# Patient Record
Sex: MALE | Race: WHITE | Employment: OTHER | URBAN - NONMETROPOLITAN AREA
[De-identification: names, ages, dates, MRNs, and addresses within clinical notes are randomized per-mention and may not be internally consistent; named-entity substitution may affect disease eponyms.]

---

## 2017-02-21 ENCOUNTER — HOSPITAL ENCOUNTER (OUTPATIENT)
Dept: GENERAL RADIOLOGY | Age: 82
Discharge: HOME | End: 2017-02-21
Payer: MEDICARE

## 2017-02-21 DIAGNOSIS — R05.9 COUGH: ICD-10-CM

## 2017-02-21 PROCEDURE — 71020 XR CHEST STANDARD TWO VW: CPT

## 2017-07-10 RX ORDER — INSULIN LISPRO PROTAMIN/LISPRO 75-25/ML
VIAL (ML) SUBCUTANEOUS
Qty: 1 VIAL | Refills: 5 | Status: SHIPPED | OUTPATIENT
Start: 2017-07-10 | End: 2017-07-26

## 2017-07-20 LAB
ALBUMIN SERPL-MCNC: 3.9 G/DL (ref 3.5–5.2)
ALP BLD-CCNC: 63 U/L (ref 40–130)
ALT SERPL-CCNC: 19 U/L (ref 5–41)
ANION GAP SERPL CALCULATED.3IONS-SCNC: 17 MMOL/L (ref 7–19)
AST SERPL-CCNC: 20 U/L (ref 5–40)
BILIRUB SERPL-MCNC: 0.7 MG/DL (ref 0.2–1.2)
BUN BLDV-MCNC: 20 MG/DL (ref 8–23)
CALCIUM SERPL-MCNC: 9.4 MG/DL (ref 8.8–10.2)
CHLORIDE BLD-SCNC: 100 MMOL/L (ref 98–111)
CO2: 28 MMOL/L (ref 22–29)
CREAT SERPL-MCNC: 1 MG/DL (ref 0.5–1.2)
GFR NON-AFRICAN AMERICAN: >60
GLUCOSE BLD-MCNC: 120 MG/DL (ref 74–109)
HBA1C MFR BLD: 7.7 %
LDL CHOLESTEROL DIRECT: 122 MG/DL
POTASSIUM SERPL-SCNC: 4.3 MMOL/L (ref 3.5–5)
SODIUM BLD-SCNC: 145 MMOL/L (ref 136–145)
TOTAL PROTEIN: 7.5 G/DL (ref 6.6–8.7)
TRIGL SERPL-MCNC: 156 MG/DL (ref 150–199)

## 2017-07-26 RX ORDER — DORZOLAMIDE HYDROCHLORIDE AND TIMOLOL MALEATE 20; 5 MG/ML; MG/ML
1 SOLUTION/ DROPS OPHTHALMIC DAILY
COMMUNITY
End: 2017-07-27

## 2017-07-26 RX ORDER — PRAVASTATIN SODIUM 40 MG
40 TABLET ORAL DAILY
COMMUNITY
End: 2017-07-27 | Stop reason: DRUGHIGH

## 2017-07-27 ENCOUNTER — OFFICE VISIT (OUTPATIENT)
Dept: INTERNAL MEDICINE | Age: 82
End: 2017-07-27
Payer: MEDICARE

## 2017-07-27 VITALS
BODY MASS INDEX: 25.48 KG/M2 | SYSTOLIC BLOOD PRESSURE: 138 MMHG | WEIGHT: 182 LBS | HEART RATE: 78 BPM | HEIGHT: 71 IN | OXYGEN SATURATION: 98 % | DIASTOLIC BLOOD PRESSURE: 78 MMHG

## 2017-07-27 DIAGNOSIS — E11.42 DIABETIC PERIPHERAL NEUROPATHY (HCC): ICD-10-CM

## 2017-07-27 DIAGNOSIS — E78.2 MIXED HYPERLIPIDEMIA: Primary | ICD-10-CM

## 2017-07-27 DIAGNOSIS — N40.0 BENIGN PROSTATIC HYPERPLASIA WITHOUT LOWER URINARY TRACT SYMPTOMS, UNSPECIFIED MORPHOLOGY: ICD-10-CM

## 2017-07-27 DIAGNOSIS — E11.42 TYPE 2 DIABETES, CONTROLLED, WITH PERIPHERAL NEUROPATHY (HCC): ICD-10-CM

## 2017-07-27 PROCEDURE — 99214 OFFICE O/P EST MOD 30 MIN: CPT | Performed by: INTERNAL MEDICINE

## 2017-07-27 RX ORDER — ATORVASTATIN CALCIUM 40 MG/1
40 TABLET, FILM COATED ORAL DAILY
Qty: 30 TABLET | Refills: 5 | Status: SHIPPED | OUTPATIENT
Start: 2017-07-27 | End: 2018-01-24 | Stop reason: SDUPTHER

## 2017-07-27 ASSESSMENT — ENCOUNTER SYMPTOMS
COUGH: 0
RHINORRHEA: 0
ABDOMINAL PAIN: 0
SORE THROAT: 0
TROUBLE SWALLOWING: 0
NAUSEA: 0
SHORTNESS OF BREATH: 0

## 2017-07-27 ASSESSMENT — PATIENT HEALTH QUESTIONNAIRE - PHQ9
1. LITTLE INTEREST OR PLEASURE IN DOING THINGS: 0
SUM OF ALL RESPONSES TO PHQ9 QUESTIONS 1 & 2: 0
SUM OF ALL RESPONSES TO PHQ QUESTIONS 1-9: 0
2. FEELING DOWN, DEPRESSED OR HOPELESS: 0

## 2018-01-22 DIAGNOSIS — E11.42 DIABETIC PERIPHERAL NEUROPATHY (HCC): ICD-10-CM

## 2018-01-22 DIAGNOSIS — E78.2 MIXED HYPERLIPIDEMIA: ICD-10-CM

## 2018-01-22 DIAGNOSIS — E11.42 TYPE 2 DIABETES, CONTROLLED, WITH PERIPHERAL NEUROPATHY (HCC): ICD-10-CM

## 2018-01-22 DIAGNOSIS — N40.0 BENIGN PROSTATIC HYPERPLASIA WITHOUT LOWER URINARY TRACT SYMPTOMS: ICD-10-CM

## 2018-01-22 LAB
ALBUMIN SERPL-MCNC: 4 G/DL (ref 3.5–5.2)
ALP BLD-CCNC: 76 U/L (ref 40–130)
ALT SERPL-CCNC: 44 U/L (ref 5–41)
ANION GAP SERPL CALCULATED.3IONS-SCNC: 15 MMOL/L (ref 7–19)
AST SERPL-CCNC: 31 U/L (ref 5–40)
BILIRUB SERPL-MCNC: 1.2 MG/DL (ref 0.2–1.2)
BUN BLDV-MCNC: 16 MG/DL (ref 8–23)
CALCIUM SERPL-MCNC: 9.2 MG/DL (ref 8.8–10.2)
CHLORIDE BLD-SCNC: 100 MMOL/L (ref 98–111)
CO2: 31 MMOL/L (ref 22–29)
CREAT SERPL-MCNC: 0.9 MG/DL (ref 0.5–1.2)
GFR NON-AFRICAN AMERICAN: >60
GLUCOSE BLD-MCNC: 152 MG/DL (ref 74–109)
HBA1C MFR BLD: 8.8 %
LDL CHOLESTEROL DIRECT: 50 MG/DL
POTASSIUM SERPL-SCNC: 5 MMOL/L (ref 3.5–5)
SODIUM BLD-SCNC: 146 MMOL/L (ref 136–145)
TOTAL PROTEIN: 7.3 G/DL (ref 6.6–8.7)

## 2018-01-24 ENCOUNTER — OFFICE VISIT (OUTPATIENT)
Dept: INTERNAL MEDICINE | Age: 83
End: 2018-01-24
Payer: MEDICARE

## 2018-01-24 VITALS
OXYGEN SATURATION: 96 % | DIASTOLIC BLOOD PRESSURE: 72 MMHG | BODY MASS INDEX: 26.63 KG/M2 | HEIGHT: 70 IN | WEIGHT: 186 LBS | SYSTOLIC BLOOD PRESSURE: 128 MMHG | HEART RATE: 82 BPM

## 2018-01-24 DIAGNOSIS — E11.42 DIABETIC PERIPHERAL NEUROPATHY (HCC): Primary | ICD-10-CM

## 2018-01-24 DIAGNOSIS — E78.2 MIXED HYPERLIPIDEMIA: ICD-10-CM

## 2018-01-24 DIAGNOSIS — E11.42 TYPE 2 DIABETES, CONTROLLED, WITH PERIPHERAL NEUROPATHY (HCC): ICD-10-CM

## 2018-01-24 PROCEDURE — 4040F PNEUMOC VAC/ADMIN/RCVD: CPT | Performed by: INTERNAL MEDICINE

## 2018-01-24 PROCEDURE — G8417 CALC BMI ABV UP PARAM F/U: HCPCS | Performed by: INTERNAL MEDICINE

## 2018-01-24 PROCEDURE — 99214 OFFICE O/P EST MOD 30 MIN: CPT | Performed by: INTERNAL MEDICINE

## 2018-01-24 PROCEDURE — 1123F ACP DISCUSS/DSCN MKR DOCD: CPT | Performed by: INTERNAL MEDICINE

## 2018-01-24 PROCEDURE — G8427 DOCREV CUR MEDS BY ELIG CLIN: HCPCS | Performed by: INTERNAL MEDICINE

## 2018-01-24 PROCEDURE — G8484 FLU IMMUNIZE NO ADMIN: HCPCS | Performed by: INTERNAL MEDICINE

## 2018-01-24 PROCEDURE — 1036F TOBACCO NON-USER: CPT | Performed by: INTERNAL MEDICINE

## 2018-01-24 RX ORDER — METRONIDAZOLE 10 MG/G
GEL TOPICAL DAILY PRN
Qty: 45 G | Refills: 1 | Status: SHIPPED | OUTPATIENT
Start: 2018-01-24

## 2018-01-24 RX ORDER — METRONIDAZOLE 10 MG/G
GEL TOPICAL DAILY PRN
COMMUNITY
End: 2018-01-24 | Stop reason: SDUPTHER

## 2018-01-24 RX ORDER — ATORVASTATIN CALCIUM 40 MG/1
40 TABLET, FILM COATED ORAL DAILY
Qty: 90 TABLET | Refills: 3 | Status: SHIPPED | OUTPATIENT
Start: 2018-01-24 | End: 2018-04-23 | Stop reason: SDUPTHER

## 2018-01-24 ASSESSMENT — ENCOUNTER SYMPTOMS
SHORTNESS OF BREATH: 0
ABDOMINAL PAIN: 0
RHINORRHEA: 0
COUGH: 0
SORE THROAT: 0
NAUSEA: 0
TROUBLE SWALLOWING: 0

## 2018-01-24 NOTE — PROGRESS NOTES
[facility] Internal Medicine    Chief Complaint   Patient presents with    6 Month Follow-Up     Pt is here for follow up of DM and mixed hyperlipidemia. Pt continues to experience bilateral lower ext pain. States his legs feel \"tired\" all the time. HPI:Returns reassessment of diabetes, right ankle dysfunction, peripheral neuropathy, hyperlipidemia. He is doing fairly well, does wear a brace on the right. Dr. Aaron Sirgreg examined his eyes every 3 months, some diabetic changes been noted. His right peripheral vision tends to be decreased. He is taking 75/25 insulin, 48 in the morning, 28 at night, averaging 100135 blood sugar morning and evening, he thinks. He denies hypoglycemia. He does have chronic pain in his feet, and tends to tire when he walks several feet. His pulses are present, feet are warm, although pulses are weak. Past Medical History:   Diagnosis Date    Adenomatous polyp of colon      tubular adenoma     Benign enlargement of prostate     Cataract     Diabetes ([facility] [address]     Diabetes mellitus type 2, controlled ([address]     Diabetic peripheral neuropathy (HCC)     Elevated blood pressure reading without diagnosis of hypertension     Fall     Glaucoma     Glaucoma     Hearing loss     Mixed hyperlipidemia     Myalgia     Sleep apnea     did not tolerate CPAP    Type 2 diabetes, controlled, with peripheral neuropathy (HCC)     Vertigo       Family History   Problem Relation Age of Onset    Cancer Mother      breast  age 36    Diabetes Father     Coronary Art Dis Father     Diabetes Other     Liver Cancer Sister       Social History     Social History    Marital status:      Spouse name: N/A    Number of children: N/A    Years of education: N/A     Occupational History    Not on file.      Social History Main Topics    Smoking status: Never Smoker    Smokeless tobacco: Never Used    Alcohol use No    Drug use: No    Sexual activity: Not Currently     Other weak  3. Mixed hyperlipidemia: LDL cholesterol is 50, continue statin    He does mention tiring of his legs with walking, which could indicate claudication. Foot care is excellent, pulses are weak. We offered a vascular study, which she declined at this point. We will need to keep that in mind.     Next visit 4, 5 months, CMP, Y2Q, to see Paty Solorio Placed This Encounter   Procedures    Comprehensive Metabolic Panel     Standing Status:   Future     Standing Expiration Date:   1/24/2019    Hemoglobin A1C     Standing Status:   Future     Standing Expiration Date:   1/24/2019

## 2018-03-19 RX ORDER — ATORVASTATIN CALCIUM 40 MG/1
TABLET, FILM COATED ORAL
Qty: 30 TABLET | Refills: 5 | Status: SHIPPED | OUTPATIENT
Start: 2018-03-19

## 2018-04-23 ENCOUNTER — OFFICE VISIT (OUTPATIENT)
Dept: INTERNAL MEDICINE | Age: 83
End: 2018-04-23
Payer: MEDICARE

## 2018-04-23 ENCOUNTER — HOSPITAL ENCOUNTER (OUTPATIENT)
Dept: GENERAL RADIOLOGY | Age: 83
Discharge: HOME | End: 2018-04-23
Payer: MEDICARE

## 2018-04-23 VITALS
DIASTOLIC BLOOD PRESSURE: 84 MMHG | SYSTOLIC BLOOD PRESSURE: 182 MMHG | HEART RATE: 75 BPM | OXYGEN SATURATION: 95 % | TEMPERATURE: 98.5 F | HEIGHT: 70 IN | BODY MASS INDEX: 26.92 KG/M2 | WEIGHT: 188 LBS

## 2018-04-23 DIAGNOSIS — R07.89 RIGHT-SIDED CHEST WALL PAIN: ICD-10-CM

## 2018-04-23 DIAGNOSIS — R07.89 RIGHT-SIDED CHEST WALL PAIN: Primary | ICD-10-CM

## 2018-04-23 PROCEDURE — 4040F PNEUMOC VAC/ADMIN/RCVD: CPT | Performed by: NURSE PRACTITIONER

## 2018-04-23 PROCEDURE — 1036F TOBACCO NON-USER: CPT | Performed by: NURSE PRACTITIONER

## 2018-04-23 PROCEDURE — G8427 DOCREV CUR MEDS BY ELIG CLIN: HCPCS | Performed by: NURSE PRACTITIONER

## 2018-04-23 PROCEDURE — 99214 OFFICE O/P EST MOD 30 MIN: CPT | Performed by: NURSE PRACTITIONER

## 2018-04-23 PROCEDURE — 96372 THER/PROPH/DIAG INJ SC/IM: CPT | Performed by: NURSE PRACTITIONER

## 2018-04-23 PROCEDURE — G8417 CALC BMI ABV UP PARAM F/U: HCPCS | Performed by: NURSE PRACTITIONER

## 2018-04-23 PROCEDURE — 71046 X-RAY EXAM CHEST 2 VIEWS: CPT

## 2018-04-23 PROCEDURE — 1123F ACP DISCUSS/DSCN MKR DOCD: CPT | Performed by: NURSE PRACTITIONER

## 2018-04-23 RX ORDER — METHYLPREDNISOLONE ACETATE 80 MG/ML
80 INJECTION, SUSPENSION INTRA-ARTICULAR; INTRALESIONAL; INTRAMUSCULAR; SOFT TISSUE ONCE
Status: COMPLETED | OUTPATIENT
Start: 2018-04-23 | End: 2018-04-23

## 2018-04-23 RX ADMIN — METHYLPREDNISOLONE ACETATE 80 MG: 80 INJECTION, SUSPENSION INTRA-ARTICULAR; INTRALESIONAL; INTRAMUSCULAR; SOFT TISSUE at 09:21

## 2018-04-23 ASSESSMENT — ENCOUNTER SYMPTOMS
RHINORRHEA: 0
BACK PAIN: 0
SHORTNESS OF BREATH: 0
PHOTOPHOBIA: 0
VOMITING: 0
VOICE CHANGE: 0
WHEEZING: 0
NAUSEA: 0
COLOR CHANGE: 0
COUGH: 0

## 2018-04-28 ENCOUNTER — HOSPITAL ENCOUNTER (EMERGENCY)
Age: 83
Discharge: HOME | End: 2018-04-28
Payer: MEDICARE

## 2018-04-28 ENCOUNTER — APPOINTMENT (OUTPATIENT)
Dept: CT IMAGING | Age: 83
End: 2018-04-28
Payer: MEDICARE

## 2018-04-28 ENCOUNTER — APPOINTMENT (OUTPATIENT)
Dept: GENERAL RADIOLOGY | Age: 83
End: 2018-04-28
Payer: MEDICARE

## 2018-04-28 ENCOUNTER — OFFICE VISIT (OUTPATIENT)
Dept: URGENT CARE | Age: 83
End: 2018-04-28

## 2018-04-28 VITALS
HEART RATE: 70 BPM | HEIGHT: 70 IN | TEMPERATURE: 98.2 F | BODY MASS INDEX: 25.2 KG/M2 | WEIGHT: 176 LBS | RESPIRATION RATE: 14 BRPM | OXYGEN SATURATION: 93 % | SYSTOLIC BLOOD PRESSURE: 155 MMHG | DIASTOLIC BLOOD PRESSURE: 64 MMHG

## 2018-04-28 VITALS
OXYGEN SATURATION: 95 % | DIASTOLIC BLOOD PRESSURE: 62 MMHG | WEIGHT: 186 LBS | HEART RATE: 83 BPM | RESPIRATION RATE: 18 BRPM | TEMPERATURE: 97.7 F | SYSTOLIC BLOOD PRESSURE: 138 MMHG | BODY MASS INDEX: 26.69 KG/M2

## 2018-04-28 DIAGNOSIS — K80.50 BILIARY COLIC: Primary | ICD-10-CM

## 2018-04-28 DIAGNOSIS — R10.9 ABDOMINAL PAIN IN MALE: Primary | ICD-10-CM

## 2018-04-28 DIAGNOSIS — K80.20 CALCULUS OF GALLBLADDER WITHOUT CHOLECYSTITIS WITHOUT OBSTRUCTION: ICD-10-CM

## 2018-04-28 LAB
ALBUMIN SERPL-MCNC: 4 G/DL (ref 3.5–5.2)
ALP BLD-CCNC: 70 U/L (ref 40–130)
ALT SERPL-CCNC: 33 U/L (ref 5–41)
ANION GAP SERPL CALCULATED.3IONS-SCNC: 11 MMOL/L (ref 7–19)
AST SERPL-CCNC: 22 U/L (ref 5–40)
BASOPHILS ABSOLUTE: 0 K/UL (ref 0–0.2)
BASOPHILS RELATIVE PERCENT: 0.4 % (ref 0–1)
BILIRUB SERPL-MCNC: 0.7 MG/DL (ref 0.2–1.2)
BILIRUBIN URINE: NEGATIVE
BLOOD, URINE: NEGATIVE
BUN BLDV-MCNC: 21 MG/DL (ref 8–23)
CALCIUM SERPL-MCNC: 9.5 MG/DL (ref 8.8–10.2)
CHLORIDE BLD-SCNC: 99 MMOL/L (ref 98–111)
CLARITY: CLEAR
CO2: 33 MMOL/L (ref 22–29)
COLOR: YELLOW
CREAT SERPL-MCNC: 0.9 MG/DL (ref 0.5–1.2)
EOSINOPHILS ABSOLUTE: 0.1 K/UL (ref 0–0.6)
EOSINOPHILS RELATIVE PERCENT: 1.8 % (ref 0–5)
GFR NON-AFRICAN AMERICAN: >60
GLUCOSE BLD-MCNC: 278 MG/DL (ref 74–109)
GLUCOSE URINE: 250 MG/DL
HCT VFR BLD CALC: 46.6 % (ref 42–52)
HEMOGLOBIN: 15.8 G/DL (ref 14–18)
KETONES, URINE: NEGATIVE MG/DL
LACTIC ACID: 1.3 MMOL/L (ref 0.5–1.9)
LEUKOCYTE ESTERASE, URINE: NEGATIVE
LIPASE: 30 U/L (ref 13–60)
LYMPHOCYTES ABSOLUTE: 2.2 K/UL (ref 1.1–4.5)
LYMPHOCYTES RELATIVE PERCENT: 30.5 % (ref 20–40)
MCH RBC QN AUTO: 31.9 PG (ref 27–31)
MCHC RBC AUTO-ENTMCNC: 33.9 G/DL (ref 33–37)
MCV RBC AUTO: 94 FL (ref 80–94)
MONOCYTES ABSOLUTE: 0.5 K/UL (ref 0–0.9)
MONOCYTES RELATIVE PERCENT: 7.1 % (ref 0–10)
NEUTROPHILS ABSOLUTE: 4.3 K/UL (ref 1.5–7.5)
NEUTROPHILS RELATIVE PERCENT: 60.2 % (ref 50–65)
NITRITE, URINE: NEGATIVE
PDW BLD-RTO: 12.6 % (ref 11.5–14.5)
PERFORMED ON: NORMAL
PH UA: 6
PLATELET # BLD: 124 K/UL (ref 130–400)
PMV BLD AUTO: 10 FL (ref 9.4–12.4)
POC TROPONIN I: 0.01 NG/ML (ref 0–0.08)
POTASSIUM SERPL-SCNC: 4 MMOL/L (ref 3.5–5)
PROTEIN UA: NEGATIVE MG/DL
RBC # BLD: 4.96 M/UL (ref 4.7–6.1)
SODIUM BLD-SCNC: 143 MMOL/L (ref 136–145)
SPECIFIC GRAVITY UA: 1.02
TOTAL PROTEIN: 7.1 G/DL (ref 6.6–8.7)
UROBILINOGEN, URINE: 0.2 E.U./DL
WBC # BLD: 7.2 K/UL (ref 4.8–10.8)

## 2018-04-28 PROCEDURE — 80053 COMPREHEN METABOLIC PANEL: CPT

## 2018-04-28 PROCEDURE — 6360000004 HC RX CONTRAST MEDICATION: Performed by: NURSE PRACTITIONER

## 2018-04-28 PROCEDURE — 81003 URINALYSIS AUTO W/O SCOPE: CPT

## 2018-04-28 PROCEDURE — 83605 ASSAY OF LACTIC ACID: CPT

## 2018-04-28 PROCEDURE — 85025 COMPLETE CBC W/AUTO DIFF WBC: CPT

## 2018-04-28 PROCEDURE — 36415 COLL VENOUS BLD VENIPUNCTURE: CPT

## 2018-04-28 PROCEDURE — 83690 ASSAY OF LIPASE: CPT

## 2018-04-28 PROCEDURE — 2580000003 HC RX 258: Performed by: NURSE PRACTITIONER

## 2018-04-28 PROCEDURE — 84484 ASSAY OF TROPONIN QUANT: CPT

## 2018-04-28 PROCEDURE — 99999 PR OFFICE/OUTPT VISIT,PROCEDURE ONLY: CPT | Performed by: SPECIALIST

## 2018-04-28 PROCEDURE — 74177 CT ABD & PELVIS W/CONTRAST: CPT

## 2018-04-28 PROCEDURE — 93005 ELECTROCARDIOGRAM TRACING: CPT

## 2018-04-28 PROCEDURE — 99284 EMERGENCY DEPT VISIT MOD MDM: CPT

## 2018-04-28 PROCEDURE — 99285 EMERGENCY DEPT VISIT HI MDM: CPT | Performed by: NURSE PRACTITIONER

## 2018-04-28 PROCEDURE — 96374 THER/PROPH/DIAG INJ IV PUSH: CPT

## 2018-04-28 PROCEDURE — 6360000002 HC RX W HCPCS: Performed by: NURSE PRACTITIONER

## 2018-04-28 PROCEDURE — 71046 X-RAY EXAM CHEST 2 VIEWS: CPT

## 2018-04-28 RX ORDER — TRAMADOL HYDROCHLORIDE 50 MG/1
50 TABLET ORAL EVERY 6 HOURS PRN
Qty: 12 TABLET | Refills: 0 | Status: SHIPPED | OUTPATIENT
Start: 2018-04-28 | End: 2018-04-30 | Stop reason: SDUPTHER

## 2018-04-28 RX ORDER — 0.9 % SODIUM CHLORIDE 0.9 %
1000 INTRAVENOUS SOLUTION INTRAVENOUS ONCE
Status: COMPLETED | OUTPATIENT
Start: 2018-04-28 | End: 2018-04-28

## 2018-04-28 RX ORDER — KETOROLAC TROMETHAMINE 30 MG/ML
15 INJECTION, SOLUTION INTRAMUSCULAR; INTRAVENOUS ONCE
Status: COMPLETED | OUTPATIENT
Start: 2018-04-28 | End: 2018-04-28

## 2018-04-28 RX ADMIN — SODIUM CHLORIDE 1000 ML: 9 INJECTION, SOLUTION INTRAVENOUS at 09:43

## 2018-04-28 RX ADMIN — KETOROLAC TROMETHAMINE 15 MG: 30 INJECTION, SOLUTION INTRAMUSCULAR; INTRAVENOUS at 09:43

## 2018-04-28 RX ADMIN — IOPAMIDOL 90 ML: 755 INJECTION, SOLUTION INTRAVENOUS at 09:49

## 2018-04-28 ASSESSMENT — PAIN SCALES - GENERAL
PAINLEVEL_OUTOF10: 3
PAINLEVEL_OUTOF10: 4
PAINLEVEL_OUTOF10: 0
PAINLEVEL_OUTOF10: 4

## 2018-04-28 ASSESSMENT — ENCOUNTER SYMPTOMS: ABDOMINAL PAIN: 1

## 2018-04-28 ASSESSMENT — PAIN DESCRIPTION - PAIN TYPE: TYPE: ACUTE PAIN

## 2018-04-30 ENCOUNTER — OFFICE VISIT (OUTPATIENT)
Dept: INTERNAL MEDICINE | Age: 83
End: 2018-04-30
Payer: MEDICARE

## 2018-04-30 VITALS
SYSTOLIC BLOOD PRESSURE: 160 MMHG | OXYGEN SATURATION: 95 % | TEMPERATURE: 98.7 F | HEART RATE: 75 BPM | DIASTOLIC BLOOD PRESSURE: 80 MMHG

## 2018-04-30 DIAGNOSIS — K80.50 BILIARY COLIC: ICD-10-CM

## 2018-04-30 DIAGNOSIS — K80.20 CALCULUS OF GALLBLADDER WITHOUT CHOLECYSTITIS WITHOUT OBSTRUCTION: ICD-10-CM

## 2018-04-30 DIAGNOSIS — B02.9 HERPES ZOSTER WITHOUT COMPLICATION: Primary | ICD-10-CM

## 2018-04-30 PROCEDURE — G8417 CALC BMI ABV UP PARAM F/U: HCPCS | Performed by: NURSE PRACTITIONER

## 2018-04-30 PROCEDURE — 4040F PNEUMOC VAC/ADMIN/RCVD: CPT | Performed by: NURSE PRACTITIONER

## 2018-04-30 PROCEDURE — 1036F TOBACCO NON-USER: CPT | Performed by: NURSE PRACTITIONER

## 2018-04-30 PROCEDURE — G8427 DOCREV CUR MEDS BY ELIG CLIN: HCPCS | Performed by: NURSE PRACTITIONER

## 2018-04-30 PROCEDURE — 1123F ACP DISCUSS/DSCN MKR DOCD: CPT | Performed by: NURSE PRACTITIONER

## 2018-04-30 PROCEDURE — 99214 OFFICE O/P EST MOD 30 MIN: CPT | Performed by: NURSE PRACTITIONER

## 2018-04-30 RX ORDER — TRAMADOL HYDROCHLORIDE 50 MG/1
50 TABLET ORAL 3 TIMES DAILY PRN
Qty: 30 TABLET | Refills: 0 | Status: SHIPPED | OUTPATIENT
Start: 2018-04-30 | End: 2018-05-30

## 2018-04-30 RX ORDER — PREDNISONE 10 MG/1
TABLET ORAL
Qty: 39 TABLET | Refills: 0 | Status: SHIPPED | OUTPATIENT
Start: 2018-04-30

## 2018-04-30 RX ORDER — METHYLPREDNISOLONE 4 MG/1
TABLET ORAL
Qty: 1 KIT | Refills: 0 | Status: SHIPPED | OUTPATIENT
Start: 2018-04-30 | End: 2018-04-30 | Stop reason: ALTCHOICE

## 2018-04-30 RX ORDER — VALACYCLOVIR HYDROCHLORIDE 1 G/1
1000 TABLET, FILM COATED ORAL 3 TIMES DAILY
Qty: 21 TABLET | Refills: 0 | Status: SHIPPED | OUTPATIENT
Start: 2018-04-30

## 2018-04-30 ASSESSMENT — ENCOUNTER SYMPTOMS
NAUSEA: 0
RHINORRHEA: 0
VOMITING: 0
BACK PAIN: 0
VOICE CHANGE: 0
COLOR CHANGE: 0
SHORTNESS OF BREATH: 0
ABDOMINAL PAIN: 1
COUGH: 0
PHOTOPHOBIA: 0

## 2018-05-01 LAB
EKG P AXIS: 66 DEGREES
EKG P-R INTERVAL: 274 MS
EKG Q-T INTERVAL: 404 MS
EKG QRS DURATION: 154 MS
EKG QTC CALCULATION (BAZETT): 421 MS
EKG T AXIS: 21 DEGREES

## 2018-05-02 ENCOUNTER — OFFICE VISIT (OUTPATIENT)
Dept: SURGERY | Age: 83
End: 2018-05-02
Payer: MEDICARE

## 2018-05-02 VITALS
TEMPERATURE: 97.4 F | BODY MASS INDEX: 26.17 KG/M2 | DIASTOLIC BLOOD PRESSURE: 74 MMHG | WEIGHT: 182.8 LBS | SYSTOLIC BLOOD PRESSURE: 140 MMHG | HEIGHT: 70 IN

## 2018-05-02 DIAGNOSIS — K80.10 CALCULUS OF GALLBLADDER WITH CHRONIC CHOLECYSTITIS WITHOUT OBSTRUCTION: Primary | ICD-10-CM

## 2018-05-02 PROCEDURE — 1036F TOBACCO NON-USER: CPT | Performed by: PHYSICIAN ASSISTANT

## 2018-05-02 PROCEDURE — 4040F PNEUMOC VAC/ADMIN/RCVD: CPT | Performed by: PHYSICIAN ASSISTANT

## 2018-05-02 PROCEDURE — G8427 DOCREV CUR MEDS BY ELIG CLIN: HCPCS | Performed by: PHYSICIAN ASSISTANT

## 2018-05-02 PROCEDURE — 99214 OFFICE O/P EST MOD 30 MIN: CPT | Performed by: PHYSICIAN ASSISTANT

## 2018-05-02 PROCEDURE — G8417 CALC BMI ABV UP PARAM F/U: HCPCS | Performed by: PHYSICIAN ASSISTANT

## 2018-05-02 PROCEDURE — 1123F ACP DISCUSS/DSCN MKR DOCD: CPT | Performed by: PHYSICIAN ASSISTANT

## 2018-05-10 ENCOUNTER — HOSPITAL ENCOUNTER (OUTPATIENT)
Dept: HOSPITAL 58 - FCC-LAB | Age: 83
Discharge: HOME | End: 2018-05-10
Attending: FAMILY MEDICINE

## 2018-05-10 DIAGNOSIS — L01.03: Primary | ICD-10-CM

## 2018-05-10 PROCEDURE — 87070 CULTURE OTHR SPECIMN AEROBIC: CPT

## 2018-09-12 ENCOUNTER — HOSPITAL ENCOUNTER (OUTPATIENT)
Dept: HOSPITAL 58 - FCC-LAB | Age: 83
Discharge: HOME | End: 2018-09-12
Attending: FAMILY MEDICINE

## 2018-09-12 DIAGNOSIS — E11.9: ICD-10-CM

## 2018-09-12 DIAGNOSIS — E78.5: Primary | ICD-10-CM

## 2018-09-12 PROCEDURE — 85025 COMPLETE CBC W/AUTO DIFF WBC: CPT

## 2018-09-12 PROCEDURE — 80053 COMPREHEN METABOLIC PANEL: CPT

## 2018-09-12 PROCEDURE — 36415 COLL VENOUS BLD VENIPUNCTURE: CPT

## 2018-09-12 PROCEDURE — 80061 LIPID PANEL: CPT

## 2018-09-12 PROCEDURE — 82043 UR ALBUMIN QUANTITATIVE: CPT

## 2018-09-12 PROCEDURE — 83037 HB GLYCOSYLATED A1C HOME DEV: CPT

## 2018-09-21 ENCOUNTER — HOSPITAL ENCOUNTER (OUTPATIENT)
Dept: VASCULAR LAB | Age: 83
Discharge: HOME | End: 2018-09-21
Payer: MEDICARE

## 2018-09-21 DIAGNOSIS — I73.9 CLAUDICATION (HCC): Primary | ICD-10-CM

## 2018-09-21 PROCEDURE — 93925 LOWER EXTREMITY STUDY: CPT

## 2018-09-21 PROCEDURE — 93922 UPR/L XTREMITY ART 2 LEVELS: CPT

## 2018-10-09 NOTE — RS.OPPTEV2
Date of Note: 10/08/18


Visit #: 1


Date of Evaluation: 10/08/18


Payer Source: MEDICARE


Surgery Performed?: No


Treatment Diagnosis: generalized weakness, balance problem


History of Condition/Mechanism of Injury:: pt reports he has aching in BLE and 

in his low back. He states that he is a little "wobbly" at times.


Prior Level of Function.....Patient was independent with: ADL's, Self Care, 

Caregiving, Ambulation/Mobility, Community Integration/Access


Level of Function: pt very active working around the house and in the yard.


Functional Limitations: Lifting, Bending, Squatting, Ambulation


Current Subjective/complaints:: pt reports his main problem is the achiness and 

weakness in BLE as well as some pain in low back. States he is a little wobbly 

but doesn't know why the md put balance impairment as an issue.


Treatment Side (optional): N/A


*Precautions: n/a





Medical History


Medical History: Diabetes, Arthritis


Smoking Status: Never smoker


Hx Home Medications: insulin,  tamulosin,


Patient's Goals: legs to be stronger and not achy and decrease back pain.





Pain Assessment





- Pain Description


Pain Location: low back (center) as well as BLE


Pain Description: Aching


Other Comments regarding Pain:: reports no pain in either location at rest. 

States pain begins with prolonged standing or bending.





Functional Outcome Measure


Tinetti: 19 (32%)





- G Codes & Severity Modifier


G Codes & Modifier: mobility walking and moving around: current CJ.  mobility 

walking and moving around: goal CI


Source of G Code score: tinetti balance assessment





Observation





- Observation


Inspection: BLE hamstring and piriformis tightness noted


Posture: Forward Head, Rounded Shoulders, Increased Thoracic Kyphosis, 

Decreased Lumbar Lordosis


Handedness: Right





Gait





- Gait Pattern


General Gait Pattern Observation: Narrow Based Gait, Decrease Stride Lngth (R), 

Decrease Stride Lngth (L)


Gait Comments: pt amb with flexed posture, decreased step length, decreased 

ISIS. pt with occasional lat LOB which he corrected independently. pt amb with 

straight cane.





General


Range of Motion: WFL's Bue and BLE.  Lumbar ROM WFL's with pain with lumbar 

flex.


Muscle Strength: BUE 5/5.  RLE hip flex 4/5, knee flex/ext 4+/5, ankle DF/PF 4-/

5.  LLE hip flex 4/5, knee flex/ext 4+/5, ankle DF/PF 4+/5





Palpation


Palpation Findings: None/Normal





Sensation





- Sensation


Right Upper Extremity: Intact/Normal


Left Upper Extremity: Intact/Normal


Right Lower Extremity: Intact/Normal


Left Lower Extremity: Intact/Normal





Balance





- Sitting Balance


Static Sitting Balance: Normal


Dynamic Sitting Balance: Normal





- Standing Balance


Static Standing Balance: Good


Dynamic Standing Balance: Fair





- Comments


Balance Assessment Comments: Tinetti score 19/28 consistent with moderate risk 

of falls.  gait speed: 0.9meters/second which is consistent with community 

ambulator.  30 sec sit to/from stand: 12 in 30 secs.





Interventions





- Exercise/Activities/Manual Therapy


Exercises/Activities: pt performed BLE AP, LAQ, seated hip flex, seated hip abd 

with green theraband x 5-10 reps. pt also performed isometric hip add x 5 reps. 

pt demonstrated stretches he does daily which include hamstring stretch, knee 

to chest, and piriformis stretch.


Manual Therapy: n/a


HOME EXERCISE PROGRAM: pt given written HEP including: AP, LAQ, seated hip flex

, seated hip abd with green theraband, as well as isometric hip add.





- Charges


Timed Code Treatment Minutes: 48


Total Treatment Time: 54


Procedures billed for this date of service:: eval med, ex





EVALUATION COMPLEXITY LEVEL


EVALUATION COMPLEXITY LEVEL: HISTORY: Medium (DM, OA, balance impairment), EXAM 

OF BODY SYSTEMS: Medium (strength, balance, posture, pain, gait ), CLINICAL 

PRESENTATION: Medium, CLINICAL DECISION MAKING: Medium





Assessment


Assessment: pt presents with decreased strength BLE, decreased balance and gait 

safety. Feel pt would benefit from skilled PT for therex for strengthening and 

balance activities to improve functional mobility and decrease risk of falls.


Patient Education: Home Exercise Program, Education of Plan of Care


Rehab Potential: Good





Short Term Goals


Goal #1: Improve BLE strength 4 +/5 except R ankle 4/5


Goal to be met by: 10/29/18


Goal #2: Improve dyn stand balance as noted by tinetti score 22/28


Goal to be met by: 10/29/18


Goal #3: pt independent with initial HEP


Goal to be met by: 10/29/18


Goal #4: No reports of pain with activity


Goal to be met by: 10/29/18





Long Term Goals


Goal #1: pt demonstrate gait speed 1.2 m/s to improve safety with amb in 

community


Goal to be met by: 11/19/18


Goal #2: pt report ability to perform normal daily activities without pain


Goal to be met by: 11/19/18


Goal #3: pt at low risk for falls as noted by tinetti score 24/28


Goal to be met by: 11/19/18





Plan





- Treatment to be Provided


Procedures: Therapeutic Exercises, Therapeutic Activity, Gait Training, 

Neuromuscular Rehab, Patient Education


Modalities: Cryotherapy, Hot Packs





- Treatment Plan


Frequency: 2-3 a week


Duration: 6 weeks


ORDER # VISITS AND/OR THROUGH DATE: 11/19/18





- Treatment Code


(1) Muscle weakness


Code(s): M62.81 - MUSCLE WEAKNESS (GENERALIZED)   





(2) Balance problem


Code(s): R26.89 - OTHER ABNORMALITIES OF GAIT AND MOBILITY   





(3) Difficulty walking


Code(s): R26.2 - DIFFICULTY IN WALKING, NOT ELSEWHERE CLASSIFIED

## 2018-10-10 NOTE — RS.OPPTDN
Subjective


Date of Note: 10/10/18


Visit #: 2


Date of Evaluation: 10/08/18


Payer Source: MEDICARE


Treatment Diagnosis: generalized weakness, balance problem


Current Subjective/complaints:: Patient reports having cramps in both calves in 

the early morning.He is doing the HEP given to him at evaluation.


*Precautions: n/a





Pain Assessment





- Pain Description


Pain Description: Dull, Aching


Pain Description: cramps in the legs


Current Pain Intensity: not rated 





Interventions





- Exercise/Activities/Manual Therapy


Exercises/Activities: 45 mins. total beginning on leg press using both LE's,3/

15 @ 45 # ,then isometric hip abd /adduction using small therapy ball.Seated LAQ

's and seated hip flexion with 2 # ,3/10 each.Discussed joint protection as he 

exercises.


Total minutes of Exercise: 45


Manual Therapy: n/a


Total minutes of Manual Therapy: 0


HOME EXERCISE PROGRAM: pt given written HEP including: AP, LAQ, seated hip flex

, seated hip abd with green theraband, as well as isometric hip add.





- Charges


Timed Code Treatment Minutes: 45


Total Treatment Time: 45


Procedures billed for this date of service:: ex 3


Assessment: Patient requires cues for speed of exercises ,especially the 

eccentric motions as he fatigues.He ambulates using a straight cane for 

safety.He reports he has to periodically stop and rest due to leg cramps [place] 

[place].He does have good safety awareness and is motivated to 

improv\e.


Patient Education: Education of diagnosis, Body/Joint mechanics, Home Exercise 

Program, Home Safety, Activity Modification, Education of Plan of Care


Patient demonstrates compliance with HEP?: Yes





Short Term Goals


Goal #1: Improve BLE strength 4 +/5 except R ankle 4/5


Goal to be met by: 10/29/18


Goal #2: Improve dyn stand balance as noted by tinetti score 22/28


Goal to be met by: 10/29/18


Goal #3: pt independent with initial HEP


Goal to be met by: 10/29/18


Progress towards Goal:: Progressing


Goal #4: No reports of pain with activity


Goal to be met by: 10/29/18





Long Term Goals


Goal #1: pt demonstrate gait speed 1.2 m/s to improve safety with amb in 

community


Goal to be met by: 11/19/18


Goal #2: pt report ability to perform normal daily activities without pain


Goal to be met by: 11/19/18


Goal #3: pt at low risk for falls as noted by tinetti score 24/28


Goal to be met by: 11/19/18





Plan


PLAN OF CARE EXPIRES ON:: 11/19/18


ORDER # VISITS AND/OR THROUGH DATE: 11/19/18


PLAN: Cont. PT to increase LE strength,eliminate /reduce LE pain.

## 2018-10-12 NOTE — RS.OPPTDN
Subjective


Date of Note: 10/12/18


Visit #: 3


Number of visits approved by Insurance: NA


Date of Evaluation: 10/08/18


Payer Source: MEDICARE


Treatment Diagnosis: generalized weakness, balance problem


Current Subjective/complaints:: Patient repoprts increased muscle sorenes after 

last session ,but no sharp pain present in the joints.


*Precautions: n/a





Pain Assessment





- Pain Description


Pain Location: LE's/hips


Current Pain Intensity: not rated





Interventions





- Exercise/Activities/Manual Therapy


Exercises/Activities: 45 mins. total beginning on exercise bike x 5 mins.,leg 

press using both LE's,2/15 @ 45 # ,then1 set/15 @ 60 # ,hip abd /adduction 

using green theraband.Seated LAQ's and seated hip flexion with 2 .5 # ,3/10 

each.


Total minutes of Exercise: 45


Manual Therapy: n/a


Total minutes of Manual Therapy: 0


HOME EXERCISE PROGRAM: pt given written HEP including: AP, LAQ, seated hip flex

, seated hip abd with green theraband, as well as isometric hip add.





- Charges


Timed Code Treatment Minutes: 40


Total Treatment Time: 45


Procedures billed for this date of service:: ex 3


Assessment: Patient fatigues more easily today in the L LE,as opposed to the R 

.He does require more rest periods today as the resistance of exercises 

increased.He requires occasional cue to control speed of eccentric motions.He 

has safe sit to stand ,but has slight hesistancy before initiating gait,but 

this appears to be good safety awareness ,not due to a balance issue.


Patient Education: Education of diagnosis, Body/Joint mechanics, Home Exercise 

Program, Home Safety, Activity Modification, Education of Plan of Care


Patient demonstrates compliance with HEP?: Yes





Short Term Goals


Goal #1: Improve BLE strength 4 +/5 except R ankle 4/5


Goal to be met by: 10/29/18


Progress towards Goal:: Progressing


Goal #2: Improve dyn stand balance as noted by tinetti score 22/28


Goal to be met by: 10/29/18


Progress towards Goal:: Progressing


Goal #3: pt independent with initial HEP


Goal to be met by: 10/29/18


Progress towards Goal:: Progressing


Goal #4: No reports of pain with activity


Goal to be met by: 10/29/18





Long Term Goals


Goal #1: pt demonstrate gait speed 1.2 m/s to improve safety with amb in 

community


Goal to be met by: 11/19/18


Goal #2: pt report ability to perform normal daily activities without pain


Goal to be met by: 11/19/18


Goal #3: pt at low risk for falls as noted by tinetti score 24/28


Goal to be met by: 11/19/18





Plan


Dates of Long Term Goals: 11/19/18


Expiration date of current Insurance Approval:: NA


PLAN: Continue skilled PT to increase LE strenght for safe transfers and gait 

on all surfaces.

## 2018-10-15 NOTE — RS.OPPTDN
Subjective


Date of Note: 10/15/18


Visit #: 4


Number of visits approved by Insurance: NA


Date of Evaluation: 10/08/18


Payer Source: MEDICARE


Treatment Diagnosis: generalized weakness, balance problem


Current Subjective/complaints:: Patient reports less soreness after last 

session ,as compared to the previous visit.He also has had less cramping in the 

calf muscles.


*Precautions: n/a





Pain Assessment





- Pain Description


Pain Location: LE's


Pain Description: muscle soreness only


Current Pain Intensity: not rated 





Interventions





- Exercise/Activities/Manual Therapy


Exercises/Activities: 50 mins. total beginning on exercise bike x 5 mins.,leg 

press using both LE's,3/15 @ 60 # ,then standing  ,hip abd /adduction ,hip 

flexion ,calf raises.Step-ups on 6 " and 12 " step x 5 each.


Total minutes of Exercise: 50


Manual Therapy: n/a


Total minutes of Manual Therapy: 0


HOME EXERCISE PROGRAM: pt given written HEP including: AP, LAQ, seated hip flex

, seated hip abd with green theraband, as well as isometric hip add.





- Charges


Timed Code Treatment Minutes: 45


Total Treatment Time: 50


Procedures billed for this date of service:: ex 3


Assessment: Progresing well,has increased strength in the LE's ,improved 

dynamic balance .He continues to be highly attentive and motivated to improve.


Patient Education: Education of diagnosis, Body/Joint mechanics, Home Exercise 

Program, Home Safety, Activity Modification, Education of Plan of Care


Patient demonstrates compliance with HEP?: Yes





Short Term Goals


Goal #1: Improve BLE strength 4 +/5 except R ankle 4/5


Goal to be met by: 10/29/18


Progress towards Goal:: Progressing


Goal #2: Improve dyn stand balance as noted by tinetti score 22/28


Goal to be met by: 10/29/18


Progress towards Goal:: Progressing


Goal #3: pt independent with initial HEP


Goal to be met by: 10/29/18


Progress towards Goal:: Progressing


Goal #4: No reports of pain with activity


Goal to be met by: 10/29/18


Progress towards Goal:: Progressing





Long Term Goals


Goal #1: pt demonstrate gait speed 1.2 m/s to improve safety with amb in 

community


Goal to be met by: 11/19/18


Goal #2: pt report ability to perform normal daily activities without pain


Goal to be met by: 11/19/18


Goal #3: pt at low risk for falls as noted by tinetti score 24/28


Goal to be met by: 11/19/18





Plan


Dates of Long Term Goals: 11/19/18


Expiration date of current Insurance Approval:: NA


PLAN: Continue PT to increase LE/trunk strength for safe ADL's.

## 2018-10-19 NOTE — RS.OPPTDN
Subjective


Date of Note: 10/19/18


Visit #: 5


Number of visits approved by Insurance: NA


Date of Evaluation: 10/08/18


Payer Source: MEDICARE


Treatment Diagnosis: generalized weakness, balance problem


Current Subjective/complaints:: Reports generalized muscle soreness today ,but 

no sharp pain .


*Precautions: n/a





Interventions





- Exercise/Activities/Manual Therapy


Exercises/Activities: 50 mins. total beginning on exercise bike x 5 mins.,then 

HEP copies given/return demo by patient  for stretches and strengthening for LE'

s /core,including ankle pumps,QS,SLR,SKTC,DKTC,LTR,pelvic tilts,standing mini-

squats,hams. curls.


Total minutes of Exercise: 50


Manual Therapy: n/a


Total minutes of Manual Therapy: 0


HOME EXERCISE PROGRAM: pt given written HEP including: AP, LAQ, seated hip flex

, seated hip abd with green theraband, as well as isometric hip add.ADDED 

exercises noted above.





- Charges


Timed Code Treatment Minutes: 45


Total Treatment Time: 50


Procedures billed for this date of service:: ex 3


Assessment: Patient has good return demo of exercises today.He reports stretch 

discomfort ,but no sharp pain reported.He reports increased ability to be up on 

his feet longer for daily tasks,also has good safety awareness.


Patient Education: Education of diagnosis, Body/Joint mechanics, Home Exercise 

Program, Home Safety, Activity Modification, Education of Plan of Care


Patient demonstrates compliance with HEP?: Yes





Short Term Goals


Goal #1: Improve BLE strength 4 +/5 except R ankle 4/5


Goal to be met by: 10/29/18


Progress towards Goal:: Progressing


Goal #2: Improve dyn stand balance as noted by tinetti score 22/28


Goal to be met by: 10/29/18


Progress towards Goal:: Progressing


Goal #3: pt independent with initial HEP


Goal to be met by: 10/29/18


Progress towards Goal:: Partially Met


Goal #4: No reports of pain with activity


Goal to be met by: 10/29/18


Progress towards Goal:: Partially Met





Long Term Goals


Goal #1: pt demonstrate gait speed 1.2 m/s to improve safety with amb in 

community


Goal to be met by: 11/19/18


Goal #2: pt report ability to perform normal daily activities without pain


Goal to be met by: 11/19/18


Progress towards goal: Progressing


Goal #3: pt at low risk for falls as noted by tinetti score 24/28


Goal to be met by: 11/19/18





Plan


Dates of Long Term Goals: 11/19/18


Expiration date of current Insurance Approval:: 11/19/18


PLAN: Continue PT to achieve maximum strength in the core/LE's for safe ADL's.

## 2018-10-23 NOTE — RS.OPPTDN
Subjective


Date of Note: 10/23/18


Visit #: 6


Number of visits approved by Insurance: NA


Date of Evaluation: 10/08/18


Payer Source: MEDICARE


Treatment Diagnosis: generalized weakness, balance problem


Current Subjective/complaints:: Patient reports muscle soreness only ,no sharp 

pain today.


*Precautions: n/a





Pain Assessment





- Pain Description


Pain Location: LE"s


Pain Description: muscle soreness





Interventions





- Exercise/Activities/Manual Therapy


Exercises/Activities: 55 mins. total beginning on exercise bike x 5 mins.,then 5

/15 reps. each of leg press @ 75# ,then seated exercises of LAq;'s,seated hip 

flexion with 4#.Isometric hip abd/adduction, x 10 each.Standing and side-

stepping to L and R for 20 ' each direction.


Total minutes of Exercise: 55


Manual Therapy: n/a


Total minutes of Manual Therapy: 0


HOME EXERCISE PROGRAM: pt given written HEP including: AP, LAQ, seated hip flex

, seated hip abd with green theraband, as well as isometric hip add.ADDED 

exercises noted above.





- Charges


Timed Code Treatment Minutes: 50


Total Treatment Time: 55


Procedures billed for this date of service:: ex 3


Assessment: Progressing well in all areas,has increased LE strength ,no loss of 

balance with transfers or gait.He reports it is easier to go up steps with the 

L LE.He is very active and motivated to improve.


Patient Education: Education of diagnosis, Body/Joint mechanics, Home Exercise 

Program, Home Safety, Activity Modification, Education of Plan of Care


Patient demonstrates compliance with HEP?: Yes





Short Term Goals


Goal #1: Improve BLE strength 4 +/5 except R ankle 4/5


Goal to be met by: 10/29/18


Progress towards Goal:: Partially Met


Goal #2: Improve dyn stand balance as noted by tinetti score 22/28


Goal to be met by: 10/29/18


Progress towards Goal:: Progressing


Goal #3: pt independent with initial HEP


Goal to be met by: 10/29/18


Progress towards Goal:: Met


Goal #4: No reports of pain with activity


Goal to be met by: 10/29/18


Progress towards Goal:: Partially Met





Long Term Goals


Goal #1: pt demonstrate gait speed 1.2 m/s to improve safety with amb in 

community


Goal to be met by: 11/19/18 (N/A today)


Goal #2: pt report ability to perform normal daily activities without pain


Goal to be met by: 11/19/18


Progress towards goal: Progressing


Goal #3: pt at low risk for falls as noted by tinetti score 24/28


Goal to be met by: 11/19/18


Progress towards goal: Progressing





Plan


Dates of Long Term Goals: 11/19/18


Expiration date of current Insurance Approval:: 11/19/18


PLAN: Cont. PT to achieve highest LOF for safe ADL,s.

## 2018-10-25 NOTE — RS.OPPTDN
Subjective


Date of Note: 10/25/18


Visit #: 7


Number of visits approved by Insurance: NA


Date of Evaluation: 10/08/18


Payer Source: MEDICARE


Treatment Diagnosis: generalized weakness, balance problem


Current Subjective/complaints:: Patient reports muscle soreness only again today

,no pain present.He [place] his [place],feels the therapy is helping him.


*Precautions: n/a





Pain Assessment





- Pain Description


Pain Location: LE's


Pain Description: muscle soreness noly





Interventions





- Exercise/Activities/Manual Therapy


Exercises/Activities: 55 mins. total beginning on exercise bike x 5 mins.,then 2

/15 reps. each of leg press @ 75# ,then 1 set/15 reps. @ 90 #. Seated exercises 

of LAQ's,seated hip flexion with 5#.Balance exercises of heel to toe and high 

steppiong with mod. assist due to chronic R ankle weakness,then side -stepping 

with supervision only.Gait speed test also done today,ambulates  1.14 m/s.


Total minutes of Exercise: 550


Manual Therapy: n/a


HOME EXERCISE PROGRAM: pt given written HEP including: AP, LAQ, seated hip flex

, seated hip abd with green theraband, as well as isometric hip add.ADDED 

exercises noted above.





- Charges


Timed Code Treatment Minutes: 50


Total Treatment Time: 55


Procedures billed for this date of service:: ex2,NMR


Assessment: Patient has moderate difficulty with heel to toe and high steps,but 

this is due to old R ankle injury ,wears ankle brace at all times when up.He 

has increased LE strength ,steadier transfer and safe gait with cane for longer 

distances,can safely walk in home distances without asssitive device.


Patient Education: Education of Plan of Care


Patient demonstrates compliance with HEP?: Yes





Short Term Goals


Goal #1: Improve BLE strength 4 +/5 except R ankle 4/5


Goal to be met by: 10/29/18


Progress towards Goal:: Partially Met


Goal #2: Improve dyn stand balance as noted by tinetti score 22/28


Goal to be met by: 10/29/18


Progress towards Goal:: Progressing


Goal #3: pt independent with initial HEP


Goal to be met by: 10/29/18


Progress towards Goal:: Met


Goal #4: No reports of pain with activity


Goal to be met by: 10/29/18


Progress towards Goal:: Partially Met





Long Term Goals


Goal #1: pt demonstrate gait speed 1.2 m/s to improve safety with amb in 

community


Goal to be met by: 11/19/18 (1.14 m/s)


Progress towards goal: Progressing


Goal #2: pt report ability to perform normal daily activities without pain


Goal to be met by: 11/19/18


Progress towards goal: Partially Met


Goal #3: pt at low risk for falls as noted by tinetti score 24/28


Goal to be met by: 11/19/18


Progress towards goal: Progressing





Plan


Dates of Long Term Goals: 11/19/18


Expiration date of current Insurance Approval:: 11/19/18


PLAN: Cont. PT to increase LE and R ankle strength,achieve highest LOF possible.

## 2018-10-30 ENCOUNTER — HOSPITAL ENCOUNTER (OUTPATIENT)
Dept: HOSPITAL 58 - REHAB | Age: 83
LOS: 1 days | End: 2018-10-31
Attending: FAMILY MEDICINE

## 2018-10-30 DIAGNOSIS — R26.89: ICD-10-CM

## 2018-10-30 DIAGNOSIS — R26.2: ICD-10-CM

## 2018-10-30 DIAGNOSIS — R53.1: Primary | ICD-10-CM

## 2018-10-30 DIAGNOSIS — M62.81: ICD-10-CM

## 2018-10-30 NOTE — RS.OPPTDN
Subjective


Date of Note: 10/30/18


Visit #: 8


Number of visits approved by Insurance: NA


Date of Evaluation: 10/08/18


Payer Source: MEDICARE


Treatment Diagnosis: generalized weakness, balance problem


Current Subjective/complaints:: Reports morning stiffness and soreness is less 

today,rested more over the weekend.


*Precautions: n/a





Pain Assessment





- Pain Description


Other Comments regarding Pain:: muscle soreness only





Interventions





- Exercise/Activities/Manual Therapy


Exercises/Activities: 55 mins. total beginning on exercise bike x 5 mins.,then 

[facility] programs of postural stability,limits of stability,

percentage weight bearing,weight shifting,maze control ,random control,and toss/

catch game.


Total minutes of Exercise: 55


Manual Therapy: n/a


Total minutes of Manual Therapy: 0


HOME EXERCISE PROGRAM: pt given written HEP including: AP, LAQ, seated hip flex

, seated hip abd with green theraband, as well as isometric hip add.ADDED 

exercises noted above.





- Charges


Timed Code Treatment Minutes: 50


Total Treatment Time: 55


Procedures billed for this date of service:: NMR 3


Assessment: Patient has R chronic ankle weakness for the past 10 years per his 

report,but the balance system does not elicit any R ankle pain.He has the most 

difficulty with weight shifting posteriorly and to the R.He has very good 

percentage of weight bearing in static stance,scores 92% in a 60 sec. trial.


Patient Education: Education of diagnosis, Body/Joint mechanics, Home Exercise 

Program, Home Safety, Activity Modification, Education of Plan of Care


Patient demonstrates compliance with HEP?: Yes





Short Term Goals


Goal #1: Improve BLE strength 4 +/5 except R ankle 4/5


Goal to be met by: 10/29/18


Progress towards Goal:: Partially Met


Goal #2: Improve dyn stand balance as noted by tinetti score 22/28


Goal to be met by: 10/29/18


Progress towards Goal:: Progressing


Goal #3: pt independent with initial HEP


Goal to be met by: 10/29/18


Progress towards Goal:: Met


Goal #4: No reports of pain with activity


Goal to be met by: 10/29/18


Progress towards Goal:: Partially Met





Long Term Goals


Goal #1: pt demonstrate gait speed 1.2 m/s to improve safety with amb in 

community


Goal to be met by: 11/19/18 (1.14 m/s)


Progress towards goal: Progressing


Goal #2: pt report ability to perform normal daily activities without pain


Goal to be met by: 11/19/18


Progress towards goal: Partially Met


Goal #3: pt at low risk for falls as noted by tinetti score 24/28


Goal to be met by: 11/19/18


Progress towards goal: Progressing





Plan


Dates of Long Term Goals: 11/19/18


Expiration date of current Insurance Approval:: 11/19/18


PLAN: Continue skilled PT to maximize LE /trunk strength for safe ADL's.

## 2019-01-04 ENCOUNTER — HOSPITAL ENCOUNTER (OUTPATIENT)
Dept: HOSPITAL 58 - LAB | Age: 84
Discharge: HOME | End: 2019-01-04
Attending: FAMILY MEDICINE

## 2019-01-04 DIAGNOSIS — R68.89: ICD-10-CM

## 2019-01-04 DIAGNOSIS — R50.9: Primary | ICD-10-CM

## 2019-01-04 DIAGNOSIS — R52: ICD-10-CM

## 2019-01-04 PROCEDURE — 87502 INFLUENZA DNA AMP PROBE: CPT

## 2019-03-12 ENCOUNTER — HOSPITAL ENCOUNTER (OUTPATIENT)
Dept: HOSPITAL 58 - RHC-LAB | Age: 84
Discharge: HOME | End: 2019-03-12
Attending: FAMILY MEDICINE

## 2019-03-12 DIAGNOSIS — M79.604: ICD-10-CM

## 2019-03-12 DIAGNOSIS — E83.42: ICD-10-CM

## 2019-03-12 DIAGNOSIS — M79.605: ICD-10-CM

## 2019-03-12 DIAGNOSIS — E11.9: Primary | ICD-10-CM

## 2019-03-12 DIAGNOSIS — I73.9: ICD-10-CM

## 2019-03-12 PROCEDURE — 36415 COLL VENOUS BLD VENIPUNCTURE: CPT

## 2019-03-12 PROCEDURE — 80053 COMPREHEN METABOLIC PANEL: CPT

## 2019-03-12 PROCEDURE — 83735 ASSAY OF MAGNESIUM: CPT

## 2019-03-12 PROCEDURE — 85025 COMPLETE CBC W/AUTO DIFF WBC: CPT

## 2019-03-12 PROCEDURE — 83037 HB GLYCOSYLATED A1C HOME DEV: CPT

## 2019-03-12 PROCEDURE — 85651 RBC SED RATE NONAUTOMATED: CPT

## 2020-03-05 ENCOUNTER — HOSPITAL ENCOUNTER (OUTPATIENT)
Dept: NON INVASIVE DIAGNOSTICS | Age: 85
Discharge: HOME | End: 2020-03-05
Payer: MEDICARE

## 2020-03-05 PROCEDURE — 93922 UPR/L XTREMITY ART 2 LEVELS: CPT

## 2020-03-05 PROCEDURE — 93925 LOWER EXTREMITY STUDY: CPT

## 2020-07-15 ENCOUNTER — OFFICE VISIT (OUTPATIENT)
Dept: CARDIOLOGY | Facility: CLINIC | Age: 85
End: 2020-07-15

## 2020-07-15 VITALS
DIASTOLIC BLOOD PRESSURE: 62 MMHG | BODY MASS INDEX: 27.2 KG/M2 | SYSTOLIC BLOOD PRESSURE: 150 MMHG | WEIGHT: 190 LBS | HEIGHT: 70 IN | HEART RATE: 75 BPM

## 2020-07-15 DIAGNOSIS — Z79.4 TYPE 2 DIABETES MELLITUS WITHOUT COMPLICATION, WITH LONG-TERM CURRENT USE OF INSULIN (HCC): ICD-10-CM

## 2020-07-15 DIAGNOSIS — E11.9 TYPE 2 DIABETES MELLITUS WITHOUT COMPLICATION, WITH LONG-TERM CURRENT USE OF INSULIN (HCC): ICD-10-CM

## 2020-07-15 DIAGNOSIS — R03.0 ELEVATED BLOOD PRESSURE READING WITHOUT DIAGNOSIS OF HYPERTENSION: ICD-10-CM

## 2020-07-15 DIAGNOSIS — R07.89 CHEST PAIN, ATYPICAL: Primary | ICD-10-CM

## 2020-07-15 PROCEDURE — 93000 ELECTROCARDIOGRAM COMPLETE: CPT | Performed by: INTERNAL MEDICINE

## 2020-07-15 PROCEDURE — 99204 OFFICE O/P NEW MOD 45 MIN: CPT | Performed by: INTERNAL MEDICINE

## 2020-07-15 RX ORDER — ASPIRIN 81 MG/1
81 TABLET ORAL DAILY
COMMUNITY

## 2020-07-15 RX ORDER — TIMOLOL MALEATE 5 MG/1
5 TABLET ORAL 2 TIMES DAILY
COMMUNITY

## 2020-07-15 RX ORDER — TAMSULOSIN HYDROCHLORIDE 0.4 MG/1
CAPSULE ORAL
COMMUNITY
Start: 2018-01-04

## 2020-07-15 NOTE — PROGRESS NOTES
"    P - CARDIOLOGY  New Patient Initial Outpatient [facility]    Primary Care Physician: Gerardo Prescott MD    Subjective     Chief Complaint: Chest pain    History of Present Illness  87-year-old who is remarkably healthy aside from longstanding insulin-dependent diabetes mellitus who was referred to me for evaluation of chest pain by Dr. Gerardo Prescott. Patient tells me he started noticing a left-sided chest soreness a few months ago.  Symptoms were constant for a few weeks, then resolved and has not returned.  Characterized as an \"ache\" that was localized to upper left chest and left arm ( though he distinguishes this from a radiation). While present, there were no aggravating factors (ie not worse with deep breathing, exertion, lying down, movement, etc.). Severity: 4/10.  Again, symptoms have completely resolved now not returned at all in several weeks.  He has no other associated symptoms like dyspnea, nausea, or diaphoresis.    Review of Systems   Constitution: Negative.   HENT: Negative.  Negative for nosebleeds.    Eyes: Negative.    Cardiovascular: Positive for chest pain (as per hpi). Negative for claudication, dyspnea on exertion, irregular heartbeat, leg swelling, near-syncope, orthopnea, palpitations, paroxysmal nocturnal dyspnea and syncope.   Respiratory: Negative.  Negative for cough, shortness of breath and wheezing.    Endocrine: Negative.    Hematologic/Lymphatic: Negative.  Negative for bleeding problem. Does not bruise/bleed easily.   Skin: Negative.    Musculoskeletal: Negative.    Gastrointestinal: Negative.  Negative for dysphagia, hematemesis, hematochezia and melena.   Genitourinary: Negative.  Negative for hematuria and non-menstrual bleeding.   Neurological: Negative.    Psychiatric/Behavioral: Negative.    Allergic/Immunologic: Negative.     Otherwise complete ROS reviewed and negative except as mentioned in the HPI.    I have reviewed the Review of Systems as provided above. " "    Past Medical History:   Past Medical History:   Diagnosis Date   • Diabetes mellitus (CMS/HCC)    • Sleep apnea        Past Surgical History:History reviewed. No pertinent surgical history.    Family History: family history includes No Known Problems in his father and mother.    Social History:  reports that he has never smoked. He has never used smokeless tobacco. He reports that he does not drink alcohol or use drugs.    Medications:    Current Outpatient Medications:   •  aspirin 81 MG EC tablet, Take 81 mg by mouth Daily., Disp: , Rfl:   •  insulin lispro protamine-insulin lispro (HumaLOG Mix 75/25) (75-25) 100 UNIT/ML suspension injection, INJECT 47 UNITS EVERY MORNING  AND 28 UNITS AT SUPPER SUB-Q MAY HAVE TO REDUCE AS DIRECTED, Disp: , Rfl:   •  magnesium oxide (MAGOX) 400 (241.3 Mg) MG tablet tablet, Take 400 mg by mouth Daily., Disp: , Rfl:   •  Olopatadine HCl (PATADAY OP), Apply  to eye(s) as directed by provider., Disp: , Rfl:   •  tamsulosin (FLOMAX) 0.4 MG capsule 24 hr capsule, TAKE ONE CAPSULE DAILY, Disp: , Rfl:   •  timolol (BLOCADREN) 5 MG tablet, Take 5 mg by mouth 2 (Two) Times a Day., Disp: , Rfl:   •  TRAVOPROST OP, Apply  to eye(s) as directed by provider., Disp: , Rfl:           Allergies:  Allergies   Allergen Reactions   • Sulfa Antibiotics Nausea And Vomiting       Objective     Vital Signs: /62 (BP Location: Left arm, Patient Position: Sitting)   Pulse 75   Ht 177.8 cm (70\")   Wt 86.2 kg (190 lb)   BMI 27.26 kg/m²     Physical Exam   Constitutional: No distress.   HENT:   Mouth/Throat: Oropharynx is clear. Pharynx is normal.   Neck: Normal range of motion and thyroid normal. Neck supple. No JVD present. No thyromegaly present.   Cardiovascular: Normal rate, regular rhythm, S1 normal, S2 normal, normal heart sounds, intact distal pulses and normal pulses.  No extrasystoles are present. PMI is not displaced. Exam reveals no gallop.   No murmur heard.  Pulmonary/Chest: Effort " normal and breath sounds normal.   Abdominal: Soft. Bowel sounds are normal. He exhibits no distension. There is no splenomegaly or hepatomegaly. There is no tenderness.   Musculoskeletal: He exhibits no edema or tenderness.   Neurological: He is alert and oriented to person, place, and time.   Skin: Skin is warm and dry.       Results Reviewed:      ECG 12 Lead  Date/Time: 7/15/2020 10:04 PM  Performed by: Paddy Preciado MD  Authorized by: Paddy Preciado MD   Comparison: not compared with previous ECG   Previous ECG: no previous ECG available  Rhythm: sinus rhythm  Conduction: right bundle branch block, left anterior fascicular block and 1st degree AV block  Other findings: left ventricular hypertrophy    Clinical impression: abnormal EKG              No results found for: CHOL, TRIG, HDL, VLDL, LDLHDL  Lab Results   Component Value Date    HGBA1C 8.8 (H) 01/22/2018     Old records reviewed:  -Dobutamine stress echocardiogram performed at [facility] on 5/12/2020, report reviewed: Dr. Carrillo reports normal LV contractility both at rest and with dobutamine infusion, though notes chest soreness and ischemic ST/T wave changes with dobutamine infusion.    Echocardiogram performed 5/11/2020 [facility], also interpreted by Dr. Carrillo, report reviewed: Normal LVEF.    Labs reviewed: CBC unremarkable CMP also unremarkable with the exception of elevated glucose (265).  proBNP normal at 267.  Assessment / Plan        Problem List Items Addressed This Visit (all new to me, stable)       Endocrine    Diabetes mellitus (CMS/[facility])    Relevant Medications    insulin lispro protamine-insulin lispro (HumaLOG Mix 75/25) (75-25) 100 UNIT/ML suspension injection       Nervous and Auditory    Chest pain, atypical - Primary        Elevated blood pressure reading without diagnosis of hypertension     Recommendations and plans: At this point, patient's chest pain has completely resolved and, by description  alone (see HPI), I would conclude that it was noncardiac in nature.  Furthermore, after reviewing the stress echocardiogram report provided, the left ventricular wall motion response to dobutamine was reported as normal by Dr. Carrillo.  Chest discomfort and ECG changes on a dobutamine stress echocardiogram are not sensitive nor specific for ischemia; the images on a dobutamine stress test provide the real investigation into possible ischemia (as opposed to an exercise stress test, with which clinical response to exercise and EKG changes do hold value in terms of assessing for ischemia).    In light of all the above, no further cardiology testing or treatment is recommended at this point time.    I encouraged the patient to continue to follow closely with Dr. Prescott for primary prevention, which may include a statin for aggressive management of lipids given his longstanding diabetes.  Also, I agree and recommended that he continue taking aspirin 81 mg daily.  Lastly, his blood pressure was mildly elevated today so I did suggest that he follow-up with Dr. Prescott for this, and to continue to keep a close eye on it, as treatment with an ACE inhibitor may be warranted if it remains elevated (again, especially in light of his diabetes).    I will be happy to see the patient back at any point in the future should need arise.    Paddy Preciado MD   07/15/20   22:06

## 2023-09-27 ENCOUNTER — OFFICE VISIT (OUTPATIENT)
Dept: GASTROENTEROLOGY | Facility: CLINIC | Age: 88
End: 2023-09-27
Payer: MEDICARE

## 2023-09-27 VITALS
HEIGHT: 70 IN | HEART RATE: 94 BPM | SYSTOLIC BLOOD PRESSURE: 162 MMHG | BODY MASS INDEX: 26.63 KG/M2 | TEMPERATURE: 98.2 F | OXYGEN SATURATION: 97 % | DIASTOLIC BLOOD PRESSURE: 58 MMHG | WEIGHT: 186 LBS

## 2023-09-27 DIAGNOSIS — K62.5 BRBPR (BRIGHT RED BLOOD PER RECTUM): Primary | ICD-10-CM

## 2023-09-27 PROCEDURE — 99204 OFFICE O/P NEW MOD 45 MIN: CPT | Performed by: NURSE PRACTITIONER

## 2023-09-27 PROCEDURE — 1159F MED LIST DOCD IN RCRD: CPT | Performed by: NURSE PRACTITIONER

## 2023-09-27 PROCEDURE — 1160F RVW MEDS BY RX/DR IN RCRD: CPT | Performed by: NURSE PRACTITIONER

## 2023-09-27 RX ORDER — HYDROCORTISONE ACETATE 25 MG/1
25 SUPPOSITORY RECTAL 2 TIMES DAILY PRN
Qty: 14 SUPPOSITORY | Refills: 0 | Status: SHIPPED | OUTPATIENT
Start: 2023-09-27

## 2023-09-27 RX ORDER — ANORECTAL OINTMENT 15.7; .44; 24; 20.6 G/100G; G/100G; G/100G; G/100G
1 OINTMENT TOPICAL 2 TIMES DAILY
COMMUNITY

## 2023-09-27 NOTE — PROGRESS NOTES
[facility] GASTROENTEROLOGY - OFFICE NOTE    9/27/2023    Juliocesar Tellez   12/19/1932    Primary Physician: Gerardo Prescott MD    Chief Complaint   Patient presents with    GI Bleeding         HISTORY OF PRESENT ILLNESS:     Juliocesar Tellez is a 89 y.o. male presents  with bright red blood per rectum. X 1 mo, daily. Small amount. Stools are loose. No solid stool in several months. Lost 4 pounds. No rectal pain. No abdominal pain.   No n/v.       He tried ointment for hemorrhoid that has not helped the rectal bleeding.       Recent hgb 12.9 on 9/18.       SCANNED - COLONOSCOPY (12/31/2013 00:00) tubular adenomatous colon polyps , recall 3 years.       No family history of colon cancer/polyps.       Past Medical History:   Diagnosis Date    Arthritis     BPH (benign prostatic hyperplasia)     Colon polyp     Diabetes mellitus     Hemorrhoids     Hemorrhoids     Hyperlipidemia     SARAHI (obstructive sleep apnea)     POAG (primary open-angle glaucoma)     Pseudophakia     Rectal bleeding     Sleep apnea        Past Surgical History:   Procedure Laterality Date    COLONOSCOPY  12/31/2013    8 polyps, tubular adenoma, a few divertucula    TONSILLECTOMY         Outpatient Medications Marked as Taking for the 9/27/23 encounter (Office Visit) with Talisha Cano APRN   Medication Sig Dispense Refill    aspirin 81 MG EC tablet Take 1 tablet by mouth Daily.      atorvastatin (LIPITOR) 40 MG tablet Take 1 tablet by mouth Daily.      cetirizine (zyrTEC) 10 MG tablet Take 1 tablet by mouth Daily.      dicyclomine (BENTYL) 10 MG capsule Take 1 capsule by mouth 4 (Four) Times a Day Before Meals & at Bedtime.      fluticasone (FLONASE) 50 MCG/ACT nasal spray 2 sprays into the nostril(s) as directed by provider Daily.      glucagon (GLUCAGEN) 1 MG injection Inject 1 mg under the skin into the appropriate area as directed 1 (One) Time As Needed for Low Blood Sugar.      insulin lispro protamine-insulin lispro (humaLOG 75-25) (75-25)  "100 UNIT/ML suspension injection INJECT 47 UNITS EVERY MORNING  AND 28 UNITS AT SUPPER SUB-Q MAY HAVE TO REDUCE AS DIRECTED      ketoconazole (NIZORAL) 2 % cream Apply 1 application  topically to the appropriate area as directed Daily.      lisinopril (PRINIVIL,ZESTRIL) 2.5 MG tablet Take 1 tablet by mouth Daily.      Menthol-Zinc Oxide (Calmoseptine) 0.44-20.6 % ointment Apply 1 application  topically to the appropriate area as directed 2 (Two) Times a Day.      Olopatadine HCl (PATADAY OP) Apply  to eye(s) as directed by provider.      tamsulosin (FLOMAX) 0.4 MG capsule 24 hr capsule TAKE ONE CAPSULE DAILY      timolol (BLOCADREN) 5 MG tablet Take 1 tablet by mouth 2 (Two) Times a Day.      TRAVOPROST OP Apply  to eye(s) as directed by provider.         Allergies   Allergen Reactions    Sulfa Antibiotics Other (See Comments)     Flu like symptoms       Social History     Socioeconomic History    Marital status:    Tobacco Use    Smoking status: Never    Smokeless tobacco: Never   Substance and Sexual Activity    Alcohol use: Never    Drug use: Never    Sexual activity: Defer       Family History   Problem Relation Age of Onset    No Known Problems Mother     No Known Problems Father     Liver cancer Sister     Colon cancer Neg Hx        Review of Systems   Constitutional:  Negative for chills, fever and unexpected weight change.   Respiratory:  Negative for shortness of breath.    Cardiovascular:  Negative for chest pain.   Gastrointestinal:  Negative for abdominal distention, abdominal pain, constipation, diarrhea, nausea and vomiting.      Vitals:    09/27/23 0935   BP: 162/58   Pulse: 94   Temp: 98.2 °F (36.8 °C)   SpO2: 97%   Weight: 84.4 kg (186 lb)   Height: 177.8 cm (70\")      Body mass index is 26.69 kg/m².    Physical Exam  Vitals reviewed.   Constitutional:       General: He is not in acute distress.  Cardiovascular:      Rate and Rhythm: Normal rate and regular rhythm.      Heart sounds: Normal " heart sounds.   Pulmonary:      Effort: Pulmonary effort is normal.      Breath sounds: Normal breath sounds.   Abdominal:      General: Bowel sounds are normal. There is no distension.      Palpations: Abdomen is soft.      Tenderness: There is no abdominal tenderness.   Skin:     General: Skin is warm and dry.   Neurological:      Mental Status: He is alert.       No results found for this or any previous visit.        ASSESSMENT AND PLAN    Assessment & Plan     Diagnoses and all orders for this visit:    1. BRBPR (bright red blood per rectum) (Primary)    Other orders  -     hydrocortisone (ANUSOL-HC) 25 MG suppository; Insert 1 suppository into the rectum 2 (Two) Times a Day As Needed for Hemorrhoids (rectal discomfort).  Dispense: 14 suppository; Refill: 0          Differential diagnoses discussed including but not limited to hemorrhoid, rectal fissure, colon polyp, diverticulosis.   I recommend emergency room if worsening or severe symptoms.  We discussed colonoscopy along with risk. Discussed that if colon perforation were to occur he would be high risk for surgery due to his age. He verbalized understanding and wishes not to proceed. We discussed anusol supplement treatment and see if this helps. Office visit 2 weeks to re eval.        Hgb is stable. He is hemodynamically stable.     * Surgery not found *           Return in about 2 weeks (around 10/11/2023).          There are no Patient Instructions on file for this visit.      BORA Segal      No results found for this or any previous visit.

## 2023-10-10 ENCOUNTER — OFFICE VISIT (OUTPATIENT)
Dept: GASTROENTEROLOGY | Facility: CLINIC | Age: 88
End: 2023-10-10
Payer: MEDICARE

## 2023-10-10 VITALS
HEIGHT: 70 IN | BODY MASS INDEX: 26.63 KG/M2 | WEIGHT: 186 LBS | DIASTOLIC BLOOD PRESSURE: 62 MMHG | OXYGEN SATURATION: 97 % | HEART RATE: 97 BPM | TEMPERATURE: 98.4 F | SYSTOLIC BLOOD PRESSURE: 170 MMHG

## 2023-10-10 DIAGNOSIS — K62.5 BRBPR (BRIGHT RED BLOOD PER RECTUM): Primary | ICD-10-CM

## 2023-10-10 PROCEDURE — 1159F MED LIST DOCD IN RCRD: CPT | Performed by: NURSE PRACTITIONER

## 2023-10-10 PROCEDURE — 1160F RVW MEDS BY RX/DR IN RCRD: CPT | Performed by: NURSE PRACTITIONER

## 2023-10-10 PROCEDURE — 99214 OFFICE O/P EST MOD 30 MIN: CPT | Performed by: NURSE PRACTITIONER

## 2023-10-10 RX ORDER — KETOCONAZOLE 20 MG/G
1 CREAM TOPICAL DAILY
COMMUNITY

## 2023-10-10 NOTE — H&P (VIEW-ONLY)
[facility] GASTROENTEROLOGY - OFFICE NOTE    10/10/2023    Juliocesar Tellez   12/19/1932    Primary Physician: Gerardo Prescott MD    Chief Complaint   Patient presents with    GI Bleeding         HISTORY OF PRESENT ILLNESS:     Juliocesar Tellez is a 89 y.o. male presents for f/u bright red blood per rectum. He did use suppositories did no help.  He has bm 2-3 times per day. The blood is noted with bm. The amount is small , streak on bm.  No blood with bm this am. No weight loss. No abdominal pain.   No lightheadedness or dizziness. No black stool.         He had labs 1 mo ago at pcp.         =====================================================================  Office visit  9-27-23 HPI Juliocesar Tellez is a 89 y.o. male presents  with bright red blood per rectum. X 1 mo, daily. Small amount. Stools are loose. No solid stool in several months. Lost 4 pounds. No rectal pain. No abdominal pain.   No n/v.         He tried ointment for hemorrhoid that has not helped the rectal bleeding.         Recent hgb 12.9 on 9/18.         SCANNED - COLONOSCOPY (12/31/2013 00:00) tubular adenomatous colon polyps , recall 3 years.         No family history of colon cancer/polyps.      Past Medical History:   Diagnosis Date    Arthritis     BPH (benign prostatic hyperplasia)     Colon polyp     Diabetes mellitus     Hemorrhoids     Hemorrhoids     Hyperlipidemia     SARAHI (obstructive sleep apnea)     POAG (primary open-angle glaucoma)     Pseudophakia     Rectal bleeding     Sleep apnea        Past Surgical History:   Procedure Laterality Date    COLONOSCOPY  12/31/2013    8 polyps, tubular adenoma, a few divertucula    TONSILLECTOMY         Outpatient Medications Marked as Taking for the 10/10/23 encounter (Office Visit) with Talisha Cano APRN   Medication Sig Dispense Refill    aspirin 81 MG EC tablet Take 1 tablet by mouth Daily.      atorvastatin (LIPITOR) 40 MG tablet Take 1 tablet by mouth Daily.      cetirizine (zyrTEC) 10 MG  tablet Take 1 tablet by mouth Daily.      dicyclomine (BENTYL) 10 MG capsule Take 1 capsule by mouth 4 (Four) Times a Day Before Meals & at Bedtime.      fluticasone (FLONASE) 50 MCG/ACT nasal spray 2 sprays into the nostril(s) as directed by provider Daily.      glucagon (GLUCAGEN) 1 MG injection Inject 1 mg under the skin into the appropriate area as directed 1 (One) Time As Needed for Low Blood Sugar.      hydrocortisone (ANUSOL-HC) 25 MG suppository Insert 1 suppository into the rectum 2 (Two) Times a Day As Needed for Hemorrhoids (rectal discomfort). 14 suppository 0    insulin lispro protamine-insulin lispro (humaLOG 75-25) (75-25) 100 UNIT/ML suspension injection INJECT 47 UNITS EVERY MORNING  AND 28 UNITS AT SUPPER SUB-Q MAY HAVE TO REDUCE AS DIRECTED      ketoconazole (NIZORAL) 2 % cream Apply 1 application  topically to the appropriate area as directed Daily.      lisinopril (PRINIVIL,ZESTRIL) 2.5 MG tablet Take 1 tablet by mouth Daily.      Menthol-Zinc Oxide (Calmoseptine) 0.44-20.6 % ointment Apply 1 application  topically to the appropriate area as directed 2 (Two) Times a Day.      Olopatadine HCl (PATADAY OP) Apply  to eye(s) as directed by provider.      tamsulosin (FLOMAX) 0.4 MG capsule 24 hr capsule TAKE ONE CAPSULE DAILY      timolol (BLOCADREN) 5 MG tablet Take 1 tablet by mouth 2 (Two) Times a Day.      TRAVOPROST OP Apply  to eye(s) as directed by provider.         Allergies   Allergen Reactions    Sulfa Antibiotics Other (See Comments)     Flu like symptoms       Social History     Socioeconomic History    Marital status:    Tobacco Use    Smoking status: Never    Smokeless tobacco: Never   Substance and Sexual Activity    Alcohol use: Never    Drug use: Never    Sexual activity: Defer       Family History   Problem Relation Age of Onset    No Known Problems Mother     No Known Problems Father     Liver cancer Sister     Colon cancer Neg Hx        Review of Systems   Constitutional:   "Negative for chills, fever and unexpected weight change.   Respiratory:  Negative for shortness of breath.    Cardiovascular:  Negative for chest pain.   Gastrointestinal:  Negative for abdominal distention, abdominal pain, constipation, diarrhea, nausea and vomiting.        Vitals:    10/10/23 1000   BP: 170/62   Pulse: 97   Temp: 98.4 °F (36.9 °C)   SpO2: 97%   Weight: 84.4 kg (186 lb)   Height: 177.8 cm (70\")      Body mass index is 26.69 kg/m².    Physical Exam  Vitals reviewed.   Constitutional:       General: He is not in acute distress.  Cardiovascular:      Rate and Rhythm: Normal rate and regular rhythm.      Heart sounds: Normal heart sounds.   Pulmonary:      Effort: Pulmonary effort is normal.      Breath sounds: Normal breath sounds.   Abdominal:      General: Bowel sounds are normal. There is no distension.      Palpations: Abdomen is soft.      Tenderness: There is no abdominal tenderness.   Genitourinary:     Comments: External anal exam negative.   RUPERTO with mild discomfort, no blood on glove, question of hemorrhoid at 4 o clock.   Skin:     General: Skin is warm and dry.   Neurological:      Mental Status: He is alert.         No results found for this or any previous visit.        ASSESSMENT AND PLAN    Assessment & Plan     Diagnoses and all orders for this visit:    1. BRBPR (bright red blood per rectum) (Primary)  -     Case Request; Standing  -     Case Request    Other orders  -     Implement Anesthesia Orders Day of Procedure; Standing  -     Obtain Informed Consent; Standing           Differential diagnoses discussed.  He is interested in pursuing further workup.  He does not want to have full colonoscopy.  Plan for flex sig. Enema am of procedure.   Request labs from pcp.            SIGMOIDOSCOPY FLEXIBLE (N/A)  All risks, benefits, alternatives, and indications of colonoscopy procedure have been discussed with the patient. Risks to include perforation of the colon requiring possible " surgery or colostomy, risk of bleeding from biopsies or removal of colon tissue, possibility of missing a colon polyp or cancer, or adverse drug reaction.  Benefits to include the diagnosis and management of disease of the colon and rectum. Alternatives to include barium enema, radiographic evaluation, lab testing or no intervention. Pt verbalizes understanding and agrees.            No follow-ups on file.          There are no Patient Instructions on file for this visit.      Talisha Cano, APRN      No results found for this or any previous visit.

## 2023-10-10 NOTE — PROGRESS NOTES
[facility] GASTROENTEROLOGY - OFFICE NOTE    10/10/2023    Juliocesar Tellez   12/19/1932    Primary Physician: Gerardo Prescott MD    Chief Complaint   Patient presents with    GI Bleeding         HISTORY OF PRESENT ILLNESS:     Juliocesar Tellez is a 89 y.o. male presents for f/u bright red blood per rectum. He did use suppositories did no help.  He has bm 2-3 times per day. The blood is noted with bm. The amount is small , streak on bm.  No blood with bm this am. No weight loss. No abdominal pain.   No lightheadedness or dizziness. No black stool.         He had labs 1 mo ago at pcp.         =====================================================================  Office visit  9-27-23 HPI Juliocesar Tellez is a 89 y.o. male presents  with bright red blood per rectum. X 1 mo, daily. Small amount. Stools are loose. No solid stool in several months. Lost 4 pounds. No rectal pain. No abdominal pain.   No n/v.         He tried ointment for hemorrhoid that has not helped the rectal bleeding.         Recent hgb 12.9 on 9/18.         SCANNED - COLONOSCOPY (12/31/2013 00:00) tubular adenomatous colon polyps , recall 3 years.         No family history of colon cancer/polyps.      Past Medical History:   Diagnosis Date    Arthritis     BPH (benign prostatic hyperplasia)     Colon polyp     Diabetes mellitus     Hemorrhoids     Hemorrhoids     Hyperlipidemia     SARAHI (obstructive sleep apnea)     POAG (primary open-angle glaucoma)     Pseudophakia     Rectal bleeding     Sleep apnea        Past Surgical History:   Procedure Laterality Date    COLONOSCOPY  12/31/2013    8 polyps, tubular adenoma, a few divertucula    TONSILLECTOMY         Outpatient Medications Marked as Taking for the 10/10/23 encounter (Office Visit) with Talisha Cano APRN   Medication Sig Dispense Refill    aspirin 81 MG EC tablet Take 1 tablet by mouth Daily.      atorvastatin (LIPITOR) 40 MG tablet Take 1 tablet by mouth Daily.      cetirizine (zyrTEC) 10 MG  tablet Take 1 tablet by mouth Daily.      dicyclomine (BENTYL) 10 MG capsule Take 1 capsule by mouth 4 (Four) Times a Day Before Meals & at Bedtime.      fluticasone (FLONASE) 50 MCG/ACT nasal spray 2 sprays into the nostril(s) as directed by provider Daily.      glucagon (GLUCAGEN) 1 MG injection Inject 1 mg under the skin into the appropriate area as directed 1 (One) Time As Needed for Low Blood Sugar.      hydrocortisone (ANUSOL-HC) 25 MG suppository Insert 1 suppository into the rectum 2 (Two) Times a Day As Needed for Hemorrhoids (rectal discomfort). 14 suppository 0    insulin lispro protamine-insulin lispro (humaLOG 75-25) (75-25) 100 UNIT/ML suspension injection INJECT 47 UNITS EVERY MORNING  AND 28 UNITS AT SUPPER SUB-Q MAY HAVE TO REDUCE AS DIRECTED      ketoconazole (NIZORAL) 2 % cream Apply 1 application  topically to the appropriate area as directed Daily.      lisinopril (PRINIVIL,ZESTRIL) 2.5 MG tablet Take 1 tablet by mouth Daily.      Menthol-Zinc Oxide (Calmoseptine) 0.44-20.6 % ointment Apply 1 application  topically to the appropriate area as directed 2 (Two) Times a Day.      Olopatadine HCl (PATADAY OP) Apply  to eye(s) as directed by provider.      tamsulosin (FLOMAX) 0.4 MG capsule 24 hr capsule TAKE ONE CAPSULE DAILY      timolol (BLOCADREN) 5 MG tablet Take 1 tablet by mouth 2 (Two) Times a Day.      TRAVOPROST OP Apply  to eye(s) as directed by provider.         Allergies   Allergen Reactions    Sulfa Antibiotics Other (See Comments)     Flu like symptoms       Social History     Socioeconomic History    Marital status:    Tobacco Use    Smoking status: Never    Smokeless tobacco: Never   Substance and Sexual Activity    Alcohol use: Never    Drug use: Never    Sexual activity: Defer       Family History   Problem Relation Age of Onset    No Known Problems Mother     No Known Problems Father     Liver cancer Sister     Colon cancer Neg Hx        Review of Systems   Constitutional:   "Negative for chills, fever and unexpected weight change.   Respiratory:  Negative for shortness of breath.    Cardiovascular:  Negative for chest pain.   Gastrointestinal:  Negative for abdominal distention, abdominal pain, constipation, diarrhea, nausea and vomiting.        Vitals:    10/10/23 1000   BP: 170/62   Pulse: 97   Temp: 98.4 øF (36.9 øC)   SpO2: 97%   Weight: 84.4 kg (186 lb)   Height: 177.8 cm (70\")      Body mass index is 26.69 kg/mý.    Physical Exam  Vitals reviewed.   Constitutional:       General: He is not in acute distress.  Cardiovascular:      Rate and Rhythm: Normal rate and regular rhythm.      Heart sounds: Normal heart sounds.   Pulmonary:      Effort: Pulmonary effort is normal.      Breath sounds: Normal breath sounds.   Abdominal:      General: Bowel sounds are normal. There is no distension.      Palpations: Abdomen is soft.      Tenderness: There is no abdominal tenderness.   Genitourinary:     Comments: External anal exam negative.   RUPERTO with mild discomfort, no blood on glove, question of hemorrhoid at 4 o clock.   Skin:     General: Skin is warm and dry.   Neurological:      Mental Status: He is alert.         No results found for this or any previous visit.        ASSESSMENT AND PLAN    Assessment & Plan     Diagnoses and all orders for this visit:    1. BRBPR (bright red blood per rectum) (Primary)  -     Case Request; Standing  -     Case Request    Other orders  -     Implement Anesthesia Orders Day of Procedure; Standing  -     Obtain Informed Consent; Standing           Differential diagnoses discussed.  He is interested in pursuing further workup.  He does not want to have full colonoscopy.  Plan for flex sig. Enema am of procedure.   Request labs from pcp.            SIGMOIDOSCOPY FLEXIBLE (N/A)  All risks, benefits, alternatives, and indications of colonoscopy procedure have been discussed with the patient. Risks to include perforation of the colon requiring possible " surgery or colostomy, risk of bleeding from biopsies or removal of colon tissue, possibility of missing a colon polyp or cancer, or adverse drug reaction.  Benefits to include the diagnosis and management of disease of the colon and rectum. Alternatives to include barium enema, radiographic evaluation, lab testing or no intervention. Pt verbalizes understanding and agrees.            No follow-ups on file.          There are no Patient Instructions on file for this visit.      Talisha Cano, APRN      No results found for this or any previous visit.

## 2023-11-09 ENCOUNTER — ANESTHESIA (OUTPATIENT)
Dept: GASTROENTEROLOGY | Facility: HOSPITAL | Age: 88
End: 2023-11-09
Payer: MEDICARE

## 2023-11-09 ENCOUNTER — HOSPITAL ENCOUNTER (OUTPATIENT)
Facility: HOSPITAL | Age: 88
Setting detail: HOSPITAL OUTPATIENT SURGERY
Discharge: HOME | End: 2023-11-09
Attending: INTERNAL MEDICINE | Admitting: INTERNAL MEDICINE
Payer: MEDICARE

## 2023-11-09 ENCOUNTER — ANESTHESIA EVENT (OUTPATIENT)
Dept: GASTROENTEROLOGY | Facility: HOSPITAL | Age: 88
End: 2023-11-09
Payer: MEDICARE

## 2023-11-09 ENCOUNTER — TELEPHONE (OUTPATIENT)
Dept: GASTROENTEROLOGY | Facility: CLINIC | Age: 88
End: 2023-11-09
Payer: MEDICARE

## 2023-11-09 VITALS
WEIGHT: 182 LBS | SYSTOLIC BLOOD PRESSURE: 130 MMHG | HEART RATE: 72 BPM | RESPIRATION RATE: 17 BRPM | OXYGEN SATURATION: 99 % | TEMPERATURE: 96.9 F | DIASTOLIC BLOOD PRESSURE: 68 MMHG | BODY MASS INDEX: 28.56 KG/M2 | HEIGHT: 67 IN

## 2023-11-09 DIAGNOSIS — C20 RECTAL CANCER: Primary | ICD-10-CM

## 2023-11-09 DIAGNOSIS — K62.5 BRBPR (BRIGHT RED BLOOD PER RECTUM): ICD-10-CM

## 2023-11-09 LAB — GLUCOSE BLDC GLUCOMTR-MCNC: 155 MG/DL (ref 70–130)

## 2023-11-09 PROCEDURE — 25810000003 SODIUM CHLORIDE 0.9 % SOLUTION: Performed by: ANESTHESIOLOGY

## 2023-11-09 PROCEDURE — 25010000002 PROPOFOL 10 MG/ML EMULSION

## 2023-11-09 PROCEDURE — 82948 REAGENT STRIP/BLOOD GLUCOSE: CPT

## 2023-11-09 PROCEDURE — 45380 COLONOSCOPY AND BIOPSY: CPT | Performed by: INTERNAL MEDICINE

## 2023-11-09 PROCEDURE — 88305 TISSUE EXAM BY PATHOLOGIST: CPT | Performed by: INTERNAL MEDICINE

## 2023-11-09 RX ORDER — PROPOFOL 10 MG/ML
VIAL (ML) INTRAVENOUS AS NEEDED
Status: DISCONTINUED | OUTPATIENT
Start: 2023-11-09 | End: 2023-11-09 | Stop reason: SURG

## 2023-11-09 RX ORDER — ONDANSETRON 2 MG/ML
4 INJECTION INTRAMUSCULAR; INTRAVENOUS ONCE AS NEEDED
Status: DISCONTINUED | OUTPATIENT
Start: 2023-11-09 | End: 2023-11-09 | Stop reason: HOSPADM

## 2023-11-09 RX ORDER — SODIUM CHLORIDE 0.9 % (FLUSH) 0.9 %
10 SYRINGE (ML) INJECTION AS NEEDED
Status: DISCONTINUED | OUTPATIENT
Start: 2023-11-09 | End: 2023-11-09 | Stop reason: HOSPADM

## 2023-11-09 RX ORDER — LIDOCAINE HYDROCHLORIDE 20 MG/ML
INJECTION, SOLUTION EPIDURAL; INFILTRATION; INTRACAUDAL; PERINEURAL AS NEEDED
Status: DISCONTINUED | OUTPATIENT
Start: 2023-11-09 | End: 2023-11-09 | Stop reason: SURG

## 2023-11-09 RX ORDER — SODIUM CHLORIDE 9 MG/ML
500 INJECTION, SOLUTION INTRAVENOUS CONTINUOUS PRN
Status: DISCONTINUED | OUTPATIENT
Start: 2023-11-09 | End: 2023-11-09 | Stop reason: HOSPADM

## 2023-11-09 RX ADMIN — PROPOFOL INJECTABLE EMULSION 150 MG: 10 INJECTION, EMULSION INTRAVENOUS at 08:51

## 2023-11-09 RX ADMIN — SODIUM CHLORIDE 500 ML: 9 INJECTION, SOLUTION INTRAVENOUS at 07:45

## 2023-11-09 RX ADMIN — LIDOCAINE HYDROCHLORIDE 50 MG: 20 INJECTION, SOLUTION EPIDURAL; INFILTRATION; INTRACAUDAL; PERINEURAL at 08:51

## 2023-11-09 NOTE — INTERVAL H&P NOTE
H&P reviewed. The patient was examined and there are no changes to the H&P.      He also notes that has been having loose stools over the last couple months.  Usually has 2 bowel moods a day but over the last 2 months he has had 3-4 loose stools a day.  That is when he noted some bright red blood.  Denies any antibiotic use.

## 2023-11-09 NOTE — TELEPHONE ENCOUNTER
On today's colonoscopy exam I encountered a rectal mass consistent with rectal cancer.  He is in agreement to go see oncology.  Can you please make an appointment with Dr. Craft or Dr. Chopra.  The patient lives in [place] [facility] of Dr. Craft and still goes to [facility] [facility] he would prefer that.  Overall, recommend first available appointment

## 2023-11-09 NOTE — ANESTHESIA POSTPROCEDURE EVALUATION
"Patient: Juliocesar Tellez    Procedure Summary       Date: 11/09/23 Room / Location: [facility] ENDOSCOPY 6 / [facility]COPY    Anesthesia Start: 0846 Anesthesia Stop: 0906    Procedure: SIGMOIDOSCOPY FLEXIBLE Diagnosis:       BRBPR (bright red blood per rectum)      (BRBPR (bright red blood per rectum) [K62.5])    Surgeons: Terry Malik MD Provider: Darryl Coon CRNA    Anesthesia Type: MAC ASA Status: 2            Anesthesia Type: MAC    Vitals  Vitals Value Taken Time   /68 11/09/23 0931   Temp     Pulse 78 11/09/23 0936   Resp 17 11/09/23 0930   SpO2 98 % 11/09/23 0936   Vitals shown include unfiled device data.        Post Anesthesia Care and Evaluation    Patient location during evaluation: PHASE II  Patient participation: complete - patient participated  Level of consciousness: awake  Pain management: adequate    Airway patency: patent  Anesthetic complications: No anesthetic complications    Cardiovascular status: acceptable  Respiratory status: acceptable  Hydration status: acceptable    Comments: /68   Pulse 72   Temp 96.9 °F (36.1 °C) (Temporal)   Resp 17   Ht 170.2 cm (67\")   Wt 82.6 kg (182 lb)   SpO2 99%   BMI 28.51 kg/m²       "

## 2023-11-09 NOTE — ANESTHESIA PREPROCEDURE EVALUATION
Anesthesia Evaluation     no history of anesthetic complications:   NPO Solid Status: > 8 hours  NPO Liquid Status: > 8 hours           Airway   Mallampati: I  TM distance: >3 FB  Neck ROM: full  No difficulty expected  Dental      Pulmonary    (+) ,sleep apnea  Cardiovascular   Exercise tolerance: poor (<4 METS)    (+) hypertension, hyperlipidemia  (-) CAD      Neuro/Psych  (-) seizures, TIA, CVA  GI/Hepatic/Renal/Endo    (+) diabetes mellitus type 2 using insulin  (-) liver disease, no renal disease    Musculoskeletal     Abdominal    Substance History      OB/GYN          Other   arthritis,                 Anesthesia Plan    ASA 2     MAC     intravenous induction     Anesthetic plan, risks, benefits, and alternatives have been provided, discussed and informed consent has been obtained with: patient.    CODE STATUS:

## 2023-11-10 LAB
LAB AP CASE REPORT: NORMAL
Lab: NORMAL
PATH REPORT.FINAL DX SPEC: NORMAL
PATH REPORT.GROSS SPEC: NORMAL

## 2023-11-13 ENCOUNTER — TELEPHONE (OUTPATIENT)
Dept: GASTROENTEROLOGY | Facility: CLINIC | Age: 88
End: 2023-11-13
Payer: MEDICARE

## 2023-11-13 NOTE — TELEPHONE ENCOUNTER
I called to check to see how he is doing.  I was able to speak to the wife.  She informing he is doing okay.  He is out on the lawnmower right now chopping up leaves.  He has his appointment 1 week from today at 11:00 with oncology.  They plan to keep that.  I informed of the path results.  They will contact me with any questions or concerns.

## 2023-11-14 NOTE — PROGRESS NOTES
"[facility] HEMATOLOGY & ONCOLOGY  [address]  [facility] [id number] [phone]    Patient Name: Juliocesar Tellez  Encounter Date: 11/20/2023  YOB: 1932  Patient Number: [id number]      REASON FOR CONSULTATION: Rectal cancer    HISTORY OF PRESENT ILLNESS: Juliocesar Tellez is a 89 yoM whose medical history includes diabetes, SARAHI, arthritis, BPH, hyperlipidemia, rectal bleeding and recently diagnosed rectal adenocarcinoma.    --9/18/23- Hgb 12.9 (pcp office)    --10/10/23-referred by Dr. Gerardo Prescott to GI for bright red rectal bleeding x 1 month.  No associated rectal pain, no weight loss no abdominal pain, no lightheadedness, dizziness nor black stool.    --11/9/23- Colonoscopy:  Rectal mass.  Malignant partially obstructing tumor from 4 to 11 cm proximal to the anus.  Biopsied.  Exam otherwise normal on direct and retroflexion views.  Final diagnosis:Large intestine, designated \"rectal mass\".  Invasive adenocarcinoma, well to moderately differentiated.    --11/20/23- Today the patient is seen for management considerations    I have reviewed the HPI and verified with the patient the accuracy of it. No changes to interval history since the information was documented. Damian Abraham MD 11/20/23      PAST MEDICAL HISTORY:  ALLERGIES:  Allergies   Allergen Reactions    Sulfa Antibiotics Other (See Comments)     Flu like symptoms     CURRENT MEDICATIONS:  Outpatient Encounter Medications as of 11/20/2023   Medication Sig Dispense Refill    aspirin 81 MG EC tablet Take 1 tablet by mouth Daily.      atorvastatin (LIPITOR) 40 MG tablet Take 1 tablet by mouth Daily.      azelastine (ASTELIN) 0.1 % nasal spray 2 sprays into the nostril(s) as directed by provider 2 (Two) Times a Day. Use in each nostril as directed      cetirizine (zyrTEC) 10 MG tablet Take 1 tablet by mouth Daily.      dicyclomine (BENTYL) 10 MG capsule Take 1 capsule by " mouth 4 (Four) Times a Day Before Meals & at Bedtime.      insulin lispro protamine-insulin lispro (humaLOG 75-25) (75-25) 100 UNIT/ML suspension injection INJECT 47 UNITS EVERY MORNING  AND 28 UNITS AT SUPPER SUB-Q MAY HAVE TO REDUCE AS DIRECTED      lisinopril (PRINIVIL,ZESTRIL) 2.5 MG tablet Take 1 tablet by mouth Daily.      magnesium oxide (MAGOX) 400 (241.3 Mg) MG tablet tablet Take 1 tablet by mouth Daily.      Olopatadine HCl (PATADAY OP) Apply  to eye(s) as directed by provider.      tamsulosin (FLOMAX) 0.4 MG capsule 24 hr capsule TAKE ONE CAPSULE DAILY      timolol (BLOCADREN) 5 MG tablet Take 1 tablet by mouth 2 (Two) Times a Day.      TRAVOPROST OP Apply  to eye(s) as directed by provider.      glucagon (GLUCAGEN) 1 MG injection Inject 1 mg under the skin into the appropriate area as directed 1 (One) Time As Needed for Low Blood Sugar. (Patient not taking: Reported on 11/20/2023)      hydrocortisone (ANUSOL-HC) 25 MG suppository Insert 1 suppository into the rectum 2 (Two) Times a Day As Needed for Hemorrhoids (rectal discomfort). (Patient not taking: Reported on 11/20/2023) 14 suppository 0    ketoconazole (NIZORAL) 2 % cream Apply 1 application  topically to the appropriate area as directed Daily. (Patient not taking: Reported on 11/20/2023)      ketoconazole (NIZORAL) 2 % cream Apply 1 application  topically to the appropriate area as directed Daily. (Patient not taking: Reported on 11/20/2023)      Menthol-Zinc Oxide (Calmoseptine) 0.44-20.6 % ointment Apply 1 application  topically to the appropriate area as directed 2 (Two) Times a Day. (Patient not taking: Reported on 11/20/2023)       No facility-administered encounter medications on file as of 11/20/2023.     Adult illnesses:  Arthritis  BPH  Diabetes  Hemorrhoids  Hyperlipidemia  SARAHI  Primary open-angle glaucoma  Pseudophakia  Rectal bleeding  Rectal carcinoma    Past surgeries:  Tonsillectomy  Colonoscopy with 8 polyps, tubular adenoma, few  "diverticula, 12/31/2013  Colonoscopy, 11/9/23-rectal mass.  Malignant partially obstructing tumor from 4 to 11 cm proximal to the anus.  Biopsied.  Exam otherwise normal on direct and retroflexion views.  Final diagnosis:Large intestine, designated \"rectal mass\": -Invasive adenocarcinoma, well to moderately differentiated    ADULT ILLNESSES:  Patient Active Problem List   Diagnosis Code    Chest pain, atypical R07.89    Diabetes mellitus E11.9    Elevated blood pressure reading without diagnosis of hypertension R03.0    BRBPR (bright red blood per rectum) K62.5    Rectal adenocarcinoma C20     SURGERIES:  Past Surgical History:   Procedure Laterality Date    COLONOSCOPY  12/31/2013    8 polyps, tubular adenoma, a few divertucula    COLONOSCOPY N/A 11/9/2023    Procedure: SIGMOIDOSCOPY FLEXIBLE;  Surgeon: Terry Malik MD;  Location: [facility] ENDOSCOPY;  Service: Gastroenterology;  Laterality: N/A;  preop; BRBPR  postop rectal mass  PCP Gerardo Prescott    TONSILLECTOMY       HEALTH MAINTENANCE ITEMS:  Health Maintenance Due   Topic Date Due    URINE MICROALBUMIN  Never done    BMI FOLLOWUP  Never done    COVID-19 Vaccine (1) Never done    Pneumococcal Vaccine 65+ (1 - PCV) Never done    ZOSTER VACCINE (2 of 3) 03/31/2014    ANNUAL WELLNESS VISIT  Never done    HEMOGLOBIN A1C  07/15/2020    DIABETIC EYE EXAM  Never done    TDAP/TD VACCINES (2 - Tdap) 02/21/2022    INFLUENZA VACCINE  08/01/2023    LIPID PANEL  09/27/2023       <no information>  Last Completed Colonoscopy       This patient has no relevant Health Maintenance data.            There is no immunization history on file for this patient.  Last Completed Mammogram       This patient has no relevant Health Maintenance data.              FAMILY HISTORY:  Family History   Problem Relation Age of Onset    No Known Problems Mother     No Known Problems Father     Liver cancer Sister     Colon cancer Neg Hx      SOCIAL HISTORY:  Social History     Socioeconomic " "History    Marital status:    Tobacco Use    Smoking status: Never    Smokeless tobacco: Never   Vaping Use    Vaping Use: Never used   Substance and Sexual Activity    Alcohol use: Never    Drug use: Never    Sexual activity: Defer       REVIEW OF SYSTEMS:  Review of Systems   Constitutional:  Negative for activity change, appetite change, fatigue and unexpected weight loss.        Manages his ADLs, to include chores, but not runs errands and no longer drives.  \"Though I can.\" Is up and about, \"about 50%.\"   HENT: Negative.     Eyes: Negative.    Respiratory: Negative.  Positive for shortness of breath (Baseline exertional dyspnea but no SOB with routine activities).         SARAHI NOT on CPAP   Cardiovascular: Negative.    Gastrointestinal:  Positive for blood in stool (with BMs) and diarrhea (Loose stools particularly at night). Negative for abdominal distention, abdominal pain, anal bleeding, constipation, nausea, rectal pain and vomiting.   Endocrine: Negative.    Genitourinary: Negative.    Musculoskeletal:  Positive for arthralgias (Chronic pains in hands, right ankle (prior injury)neck and lower back), back pain (Chronic), myalgias (with ambulation, \"50 feet\") and neck stiffness (Chronic \"stiffness\"). Negative for neck pain.   Skin: Negative.    Neurological: Negative.    Hematological: Negative.    Psychiatric/Behavioral: Negative.         /68   Pulse 103   Temp 97.9 °F (36.6 °C) (Temporal)   Resp 18   Ht 170.2 cm (67\")   Wt 84.3 kg (185 lb 14.4 oz)   SpO2 95%   BMI 29.12 kg/m²  Body surface area is 1.96 meters squared.  Pain Score    11/20/23 1115   PainSc: 0-No pain       Physical Exam  Vitals and nursing note reviewed.   Constitutional:       Comments: Pleasant, cooperative, heavyset middle-aged elderly male.  Brought in by wheelchair but ambulatory in the room.  ECOG 1-2.  He is here with his spouse, Cristina AMAROT:      Head: Normocephalic and atraumatic.   Eyes:      General: No " "scleral icterus.     Extraocular Movements: Extraocular movements intact.      Pupils: Pupils are equal, round, and reactive to light.   Cardiovascular:      Rate and Rhythm: Tachycardia present.   Pulmonary:      Effort: Pulmonary effort is normal.   Abdominal:      Palpations: Abdomen is soft. There is no mass (RUQ fullness/hepatomegaly?).      Tenderness: There is no abdominal tenderness.   Musculoskeletal:         General: Swelling present. Normal range of motion.      Cervical back: Normal range of motion.      Right lower leg: Edema (1+ (chronic)- wearing an ankle brace for prior ankle injury) present.      Left lower leg: Edema (1+ (chronic)) present.   Lymphadenopathy:      Cervical: No cervical adenopathy.   Skin:     General: Skin is warm.   Neurological:      General: No focal deficit present.      Mental Status: He is alert and oriented to person, place, and time.   Psychiatric:         Mood and Affect: Mood normal.         Behavior: Behavior normal.         Thought Content: Thought content normal.           Assessment:   Rectal adenocarcinoma  --Original tumor stage:  Incomplete staging assessment  --Original tumor burden:  --11/9/23- Colonoscopy:  Rectal mass.  Malignant partially obstructing tumor from 4 to 11 cm proximal to the anus.  Biopsied.  Exam otherwise normal on direct and retroflexion views.  Final diagnosis:Large intestine, designated \"rectal mass\".  Invasive adenocarcinoma, well to moderately differentiated.  --Complications of tumor:  --Rectal bleeding, anemia  --Tumor status:  --Untreated    2.   Anemia, NOS. Partially from rectal bleeding  3.   Diabetes on insulin  4.   Arthritis  5.   BPH  6.   SARAHI  7.   Advanced age  8.   Borderline PS (ECOG 1-2)    Plan:  Apprised of (limited) diagnostic information, thus the rationale for further evaluations as enumerated below.  They agree to the plan so far.  Draw baseline CBC with differential, CMP, iron, iron saturation, ferritin, B12, folate, " CEA  Schedule staging CT scans of the chest, abdomen/pelvis with p.o. and IV contrast  Refer to Dr. Calderon Cano Re:  Rectal EUS for T + N staging assessment  Refer to radiation oncology Re: Anticipate total neoadjuvant chemotherapy - concurrent RT - surgery  Refer to general surgery Re:  Mediport placement and assessment for surgery  Return to office in 2 weeks for further recommendations    I spent ~86 minutes caring for Juliocesar on this date of service. This time includes time spent by me in the following activities: preparing for the visit, reviewing tests, performing a medically appropriate examination and/or evaluation, counseling and educating the patient/family/caregiver, ordering medications, tests, or procedures and documenting information in the medical record.

## 2023-11-20 ENCOUNTER — CONSULT (OUTPATIENT)
Dept: ONCOLOGY | Facility: CLINIC | Age: 88
End: 2023-11-20
Payer: MEDICARE

## 2023-11-20 ENCOUNTER — LAB (OUTPATIENT)
Dept: LAB | Facility: HOSPITAL | Age: 88
End: 2023-11-20
Payer: MEDICARE

## 2023-11-20 VITALS
BODY MASS INDEX: 29.18 KG/M2 | HEIGHT: 67 IN | DIASTOLIC BLOOD PRESSURE: 68 MMHG | HEART RATE: 103 BPM | TEMPERATURE: 97.9 F | OXYGEN SATURATION: 95 % | WEIGHT: 185.9 LBS | RESPIRATION RATE: 18 BRPM | SYSTOLIC BLOOD PRESSURE: 142 MMHG

## 2023-11-20 DIAGNOSIS — C20 RECTAL ADENOCARCINOMA: Primary | ICD-10-CM

## 2023-11-20 DIAGNOSIS — C20 RECTAL ADENOCARCINOMA: ICD-10-CM

## 2023-11-20 LAB
ALBUMIN SERPL-MCNC: 3.6 G/DL (ref 3.5–5.2)
ALBUMIN/GLOB SERPL: 1 G/DL
ALP SERPL-CCNC: 101 U/L (ref 39–117)
ALT SERPL W P-5'-P-CCNC: 15 U/L (ref 1–41)
ANION GAP SERPL CALCULATED.3IONS-SCNC: 7 MMOL/L (ref 5–15)
AST SERPL-CCNC: 20 U/L (ref 1–40)
BASOPHILS # BLD AUTO: 0.03 10*3/MM3 (ref 0–0.2)
BASOPHILS NFR BLD AUTO: 0.3 % (ref 0–1.5)
BILIRUB SERPL-MCNC: 0.2 MG/DL (ref 0–1.2)
BUN SERPL-MCNC: 18 MG/DL (ref 8–23)
BUN/CREAT SERPL: 22 (ref 7–25)
CALCIUM SPEC-SCNC: 9 MG/DL (ref 8.2–9.6)
CEA SERPL-MCNC: 15.1 NG/ML
CHLORIDE SERPL-SCNC: 107 MMOL/L (ref 98–107)
CO2 SERPL-SCNC: 32 MMOL/L (ref 22–29)
CREAT SERPL-MCNC: 0.82 MG/DL (ref 0.76–1.27)
DEPRECATED RDW RBC AUTO: 44 FL (ref 37–54)
EGFRCR SERPLBLD CKD-EPI 2021: 83.4 ML/MIN/1.73
EOSINOPHIL # BLD AUTO: 0.24 10*3/MM3 (ref 0–0.4)
EOSINOPHIL NFR BLD AUTO: 2.8 % (ref 0.3–6.2)
ERYTHROCYTE [DISTWIDTH] IN BLOOD BY AUTOMATED COUNT: 13.1 % (ref 12.3–15.4)
FERRITIN SERPL-MCNC: 24.66 NG/ML (ref 30–400)
FOLATE SERPL-MCNC: 19.7 NG/ML (ref 4.78–24.2)
GLOBULIN UR ELPH-MCNC: 3.6 GM/DL
GLUCOSE SERPL-MCNC: 72 MG/DL (ref 65–99)
HCT VFR BLD AUTO: 37.7 % (ref 37.5–51)
HGB BLD-MCNC: 12.1 G/DL (ref 13–17.7)
IMM GRANULOCYTES # BLD AUTO: 0.04 10*3/MM3 (ref 0–0.05)
IMM GRANULOCYTES NFR BLD AUTO: 0.5 % (ref 0–0.5)
IRON 24H UR-MRATE: 46 MCG/DL (ref 59–158)
IRON SATN MFR SERPL: 13 % (ref 20–50)
LYMPHOCYTES # BLD AUTO: 3.33 10*3/MM3 (ref 0.7–3.1)
LYMPHOCYTES NFR BLD AUTO: 38.4 % (ref 19.6–45.3)
MCH RBC QN AUTO: 30 PG (ref 26.6–33)
MCHC RBC AUTO-ENTMCNC: 32.1 G/DL (ref 31.5–35.7)
MCV RBC AUTO: 93.3 FL (ref 79–97)
MONOCYTES # BLD AUTO: 0.77 10*3/MM3 (ref 0.1–0.9)
MONOCYTES NFR BLD AUTO: 8.9 % (ref 5–12)
NEUTROPHILS NFR BLD AUTO: 4.26 10*3/MM3 (ref 1.7–7)
NEUTROPHILS NFR BLD AUTO: 49.1 % (ref 42.7–76)
NRBC BLD AUTO-RTO: 0 /100 WBC (ref 0–0.2)
PLATELET # BLD AUTO: 196 10*3/MM3 (ref 140–450)
PMV BLD AUTO: 8.9 FL (ref 6–12)
POTASSIUM SERPL-SCNC: 4.2 MMOL/L (ref 3.5–5.2)
PROT SERPL-MCNC: 7.2 G/DL (ref 6–8.5)
RBC # BLD AUTO: 4.04 10*6/MM3 (ref 4.14–5.8)
SODIUM SERPL-SCNC: 146 MMOL/L (ref 136–145)
TIBC SERPL-MCNC: 346 MCG/DL (ref 298–536)
TRANSFERRIN SERPL-MCNC: 232 MG/DL (ref 200–360)
VIT B12 BLD-MCNC: 380 PG/ML (ref 211–946)
WBC NRBC COR # BLD AUTO: 8.67 10*3/MM3 (ref 3.4–10.8)

## 2023-11-20 PROCEDURE — 84466 ASSAY OF TRANSFERRIN: CPT

## 2023-11-20 PROCEDURE — 82378 CARCINOEMBRYONIC ANTIGEN: CPT

## 2023-11-20 PROCEDURE — 36415 COLL VENOUS BLD VENIPUNCTURE: CPT

## 2023-11-20 PROCEDURE — 85025 COMPLETE CBC W/AUTO DIFF WBC: CPT

## 2023-11-20 PROCEDURE — 82607 VITAMIN B-12: CPT

## 2023-11-20 PROCEDURE — 83540 ASSAY OF IRON: CPT

## 2023-11-20 PROCEDURE — 80053 COMPREHEN METABOLIC PANEL: CPT

## 2023-11-20 PROCEDURE — 82746 ASSAY OF FOLIC ACID SERUM: CPT

## 2023-11-20 PROCEDURE — 82728 ASSAY OF FERRITIN: CPT

## 2023-11-20 RX ORDER — AZELASTINE 1 MG/ML
2 SPRAY, METERED NASAL 2 TIMES DAILY
COMMUNITY

## 2023-11-22 ENCOUNTER — TELEPHONE (OUTPATIENT)
Dept: ONCOLOGY | Facility: CLINIC | Age: 88
End: 2023-11-22
Payer: MEDICARE

## 2023-11-22 ENCOUNTER — PATIENT ROUNDING (BHMG ONLY) (OUTPATIENT)
Dept: ONCOLOGY | Facility: CLINIC | Age: 88
End: 2023-11-22
Payer: MEDICARE

## 2023-11-22 PROBLEM — D50.9 IRON DEFICIENCY ANEMIA: Status: ACTIVE | Noted: 2023-11-22

## 2023-11-22 RX ORDER — FAMOTIDINE 10 MG/ML
20 INJECTION, SOLUTION INTRAVENOUS AS NEEDED
OUTPATIENT
Start: 2023-12-04

## 2023-11-22 RX ORDER — SODIUM CHLORIDE 9 MG/ML
20 INJECTION, SOLUTION INTRAVENOUS ONCE
OUTPATIENT
Start: 2023-12-04

## 2023-11-22 RX ORDER — DIPHENHYDRAMINE HYDROCHLORIDE 50 MG/ML
50 INJECTION INTRAMUSCULAR; INTRAVENOUS AS NEEDED
OUTPATIENT
Start: 2023-12-04

## 2023-11-22 NOTE — PROGRESS NOTES
November 22, 2023    Hello, may I speak with Juliocesar Tellez?    My name is Ara Jackson    I am  with [facility] HEMATOLOGY & ONCOLOGY  [address]  [place] [id number] [phone].    Before we get started may I verify your date of birth? 12/19/1932    I am calling to officially welcome you to our practice and ask about your recent visit. Is this a good time to talk? yes    Tell me about your visit with us. What things went well?  Went well        We're always looking for ways to make our patients' experiences even better. Do you have recommendations on ways we may improve?  no    Overall were you satisfied with your first visit to our practice? yes       I appreciate you taking the time to speak with me today. Is there anything else I can do for you? no      Thank you, and have a great day.

## 2023-11-22 NOTE — TELEPHONE ENCOUNTER
----- Message from Damian Abraham MD sent at 11/20/2023  3:44 PM CST -----  Pls schedule injectafer 750 mg iv weekly x 2 or venofer 200 mg iv daily x 4  ----- Message -----  From: Lab, Background User  Sent: 11/20/2023  12:48 PM CST  To: Damian Abraham MD

## 2023-11-25 NOTE — PROGRESS NOTES
"[facility] HEMATOLOGY & ONCOLOGY  [address]  Merg[facility] with [facility] [id number] [phone]    Patient Name: Juliocesar Tellez  Encounter Date: 12/04/2023  YOB: 1932  Patient Number: [id number]        REASON FOR VISIT: Juliocesar Tellez is a 89 yoM who returns in follow-up of rectal adenocarcinoma.  He is seen to discuss the diagnostic information gathered since his initial visit and for subsequent management planning. He is here with his spouse, Cristina.    I have reviewed the HPI and verified with the patient the accuracy of it. No changes to interval history since the information was documented. Damian Abraham MD 12/04/23      --medical history includes diabetes, SARAHI, arthritis, BPH, hyperlipidemia, rectal bleeding and recently diagnosed rectal adenocarcinoma.  --9/18/23- Hgb 12.9 (pcp office)  --10/10/23-referred by Dr. Gerardo Prescott to GI for bright red rectal bleeding x 1 month.  No associated rectal pain, no weight loss no abdominal pain, no lightheadedness, dizziness nor black stool.  --11/9/23- Colonoscopy:  Rectal mass.  Malignant partially obstructing tumor from 4 to 11 cm proximal to the anus.  Biopsied.  Exam otherwise normal on direct and retroflexion views.  Final diagnosis:Large intestine, designated \"rectal mass\".  Invasive adenocarcinoma, well to moderately differentiated.  --11/20/23- Today the patient is seen for management considerations.  Sodium is 146 otherwise normal CMP, ferritin 24 (L), iron 46 (L), iron saturation 13% (L), B12 380, folate 19.7, CEA 15 (H), Hgb 12.1, MCV 93.3, platelets 196,000, WBC 8.67.  --12/1/23-CT chest-No definite evidence of metastatic disease in the chest. There is a 7 mm nodular density along the right minor fissure which is likely an intrafissural lymph node or small area of atelectasis/scarring. However, recommend special attention on follow-up imaging to exclude a pulmonary nodule. Advanced " pulmonary fibrosis, UIP pattern.  Mild bilateral hilar lymphadenopathy, likely reactive.   --12/1/23- CT abdomen/pelvis-  Thickening of the wall of the rectum consistent with the history of  rectal neoplasm. The outer wall of the rectum is somewhat ill-defined. Therefore, there may be some extension through the wall. In addition, the fat plane between the rectum and prostate is obscured suggesting possible extension of tumor into this area. There is a 5 mm lymph node in the left perirectal fat image 71 series 3. There is an additional small lymph node more superiorly in the pelvic mesenteric fat measuring 6-7 mm in short axis diameter. No other evidence of metastatic disease in the abdomen or pelvis. Bilateral inguinal hernias. The hernia on the right contains a portion of the bladder wall and peritoneal fat. The hernia on the left contains peritoneal fat. There is thickening of the bladder wall. The bladder is under distended. Wall thickening may be due to muscular hypertrophy as there is enlargement of the prostate. Artifact of under distention, infection  and inflammation are considered. Gallstones in the gallbladder.  -- Pending EUS of the rectum    Previous interventions:  -- Anticipate Injectafer or Venofer IV          PAST MEDICAL HISTORY:  ALLERGIES:  Allergies   Allergen Reactions    Sulfa Antibiotics Other (See Comments)     Flu like symptoms     CURRENT MEDICATIONS:  Outpatient Encounter Medications as of 12/4/2023   Medication Sig Dispense Refill    acetaminophen (Tylenol) 325 MG tablet Take 3 tablets by mouth Every 8 (Eight) Hours. Take every 8 hours for 3 days then take prn as needed.      aspirin 81 MG EC tablet Take 1 tablet by mouth Daily.      atorvastatin (LIPITOR) 40 MG tablet Take 1 tablet by mouth Daily.      azelastine (ASTELIN) 0.1 % nasal spray 2 sprays into the nostril(s) as directed by provider 2 (Two) Times a Day. Use in each nostril as directed      cetirizine (zyrTEC) 10 MG tablet Take  1 tablet by mouth Daily.      dicyclomine (BENTYL) 10 MG capsule Take 1 capsule by mouth 4 (Four) Times a Day Before Meals & at Bedtime.      insulin lispro protamine-insulin lispro (humaLOG 75-25) (75-25) 100 UNIT/ML suspension injection Inject  under the skin into the appropriate area as directed Take As Directed. 60 MORNING 35 EVENING      ketoconazole (NIZORAL) 2 % cream Apply 1 application  topically to the appropriate area as directed Daily.      ketoconazole (NIZORAL) 2 % cream Apply 1 application  topically to the appropriate area as directed Daily.      lisinopril (PRINIVIL,ZESTRIL) 2.5 MG tablet Take 1 tablet by mouth Daily.      magnesium oxide (MAGOX) 400 (241.3 Mg) MG tablet tablet Take 1 tablet by mouth Daily.      Menthol-Zinc Oxide (Calmoseptine) 0.44-20.6 % ointment Apply 1 application  topically to the appropriate area as directed 2 (Two) Times a Day.      Olopatadine HCl (PATADAY OP) Apply  to eye(s) as directed by provider.      ondansetron (Zofran) 4 MG tablet Take 1 tablet by mouth Every 8 (Eight) Hours As Needed for Nausea or Vomiting. 15 tablet 0    tamsulosin (FLOMAX) 0.4 MG capsule 24 hr capsule TAKE ONE CAPSULE DAILY      timolol (BLOCADREN) 5 MG tablet Take 1 tablet by mouth 2 (Two) Times a Day.      [] traMADol (Ultram) 50 MG tablet Take 1 tablet by mouth Every 8 (Eight) Hours As Needed for Severe Pain for up to 3 days. 5 tablet 0    TRAVOPROST OP Apply  to eye(s) as directed by provider.      [DISCONTINUED] glucagon (GLUCAGEN) 1 MG injection Inject 1 mg under the skin into the appropriate area as directed 1 (One) Time As Needed for Low Blood Sugar. (Patient not taking: Reported on 2023)      [DISCONTINUED] hydrocortisone (ANUSOL-HC) 25 MG suppository Insert 1 suppository into the rectum 2 (Two) Times a Day As Needed for Hemorrhoids (rectal discomfort). (Patient not taking: Reported on 2023) 14 suppository 0    [DISCONTINUED] barium (READI-CAT 2) suspension 900 mL     "    No facility-administered encounter medications on file as of 12/4/2023.     Adult illnesses:  Arthritis  BPH  Diabetes  Hemorrhoids  Hyperlipidemia  SARAHI  Primary open-angle glaucoma  Pseudophakia  Rectal bleeding  Rectal carcinoma    Past surgeries:  Tonsillectomy  Colonoscopy with 8 polyps, tubular adenoma, few diverticula, 12/31/2013  Colonoscopy, 11/9/23-rectal mass.  Malignant partially obstructing tumor from 4 to 11 cm proximal to the anus.  Biopsied.  Exam otherwise normal on direct and retroflexion views.  Final diagnosis:Large intestine, designated \"rectal mass\": -Invasive adenocarcinoma, well to moderately differentiated  Port-A-Cath placement in the right cephalic vein, 11/29/23-Dr. Ortiz    ADULT ILLNESSES:  Patient Active Problem List   Diagnosis Code    Chest pain, atypical R07.89    Diabetes mellitus E11.9    Elevated blood pressure reading without diagnosis of hypertension R03.0    BRBPR (bright red blood per rectum) K62.5    Rectal adenocarcinoma C20    Iron deficiency anemia D50.9    Encounter for care related to vascular access port Z45.2     SURGERIES:  Past Surgical History:   Procedure Laterality Date    COLONOSCOPY  12/31/2013    8 polyps, tubular adenoma, a few divertucula    COLONOSCOPY N/A 11/09/2023    Procedure: SIGMOIDOSCOPY FLEXIBLE;  Surgeon: Terry Malik MD;  Location: [facility] ENDOSCOPY;  Service: Gastroenterology;  Laterality: N/A;  preop; BRBPR  postop rectal mass  PCP Gerardo Prescott    TONSILLECTOMY      VENOUS ACCESS DEVICE (PORT) INSERTION N/A 11/29/2023    Procedure: Single Lumen Port-a-cath insertion with flouroscopy;  Surgeon: Marquita Ortiz MD;  Location: [facility] OR;  Service: General;  Laterality: N/A;     HEALTH MAINTENANCE ITEMS:  Health Maintenance Due   Topic Date Due    URINE MICROALBUMIN  Never done    COVID-19 Vaccine (1) Never done    Pneumococcal Vaccine 65+ (1 - PCV) Never done    ZOSTER VACCINE (2 of 3) 03/31/2014    ANNUAL WELLNESS VISIT  Never " "done    HEMOGLOBIN A1C  07/15/2020    DIABETIC EYE EXAM  Never done    TDAP/TD VACCINES (2 - Tdap) 02/21/2022    INFLUENZA VACCINE  08/01/2023    LIPID PANEL  09/27/2023       <no information>  Last Completed Colonoscopy       This patient has no relevant Health Maintenance data.            There is no immunization history on file for this patient.  Last Completed Mammogram       This patient has no relevant Health Maintenance data.              FAMILY HISTORY:  Family History   Problem Relation Age of Onset    No Known Problems Mother     No Known Problems Father     Liver cancer Sister     Colon cancer Neg Hx      SOCIAL HISTORY:  Social History     Socioeconomic History    Marital status:    Tobacco Use    Smoking status: Never    Smokeless tobacco: Never   Vaping Use    Vaping Use: Never used   Substance and Sexual Activity    Alcohol use: Never    Drug use: Never    Sexual activity: Defer       REVIEW OF SYSTEMS:  Review of Systems   Constitutional:  Negative for activity change, appetite change, fatigue and unexpected weight loss.        Manages his ADLs, to include chores, but not running errands and no longer drives. Is up and about, \"still about 50%.\"   HENT: Negative.     Eyes: Negative.    Respiratory:  Positive for shortness of breath (Baseline exertional dyspnea but no SOB with routine activities).         SARAHI NOT on CPAP   Cardiovascular: Negative.    Gastrointestinal:  Positive for blood in stool (with BMs) and diarrhea (Loose stools particularly at night). Negative for abdominal distention, abdominal pain, anal bleeding, constipation, nausea, rectal pain and vomiting.   Endocrine: Negative.    Genitourinary: Negative.    Musculoskeletal:  Positive for arthralgias (Chronic pains in hands, right ankle (prior injury)neck and lower back), back pain (Chronic), myalgias (with ambulation, \"50 feet\") and neck stiffness (Chronic \"stiffness\"). Negative for neck pain.   Skin: Negative.    Neurological: " "Negative.    Hematological: Negative.    Psychiatric/Behavioral: Negative.         /72   Pulse 91   Temp 98.3 °F (36.8 °C) (Temporal)   Resp 18   Ht 175.3 cm (69\")   Wt 83.9 kg (185 lb)   SpO2 96%   BMI 27.32 kg/m²  Body surface area is 2 meters squared.  Pain Score    12/04/23 0920   PainSc: 0-No pain         Physical Exam  Vitals and nursing note reviewed.   Constitutional:       Comments: Pleasant, cooperative, heavyset middle-aged elderly male.  Brought in by wheelchair but ambulatory in the room.  ECOG 2 (prior: 1-2).      No weight changes since the initial visit   HENT:      Head: Normocephalic and atraumatic.   Eyes:      General: No scleral icterus.     Extraocular Movements: Extraocular movements intact.      Pupils: Pupils are equal, round, and reactive to light.   Cardiovascular:      Rate and Rhythm: Normal rate.   Pulmonary:      Effort: Pulmonary effort is normal.      Comments: Port in the right upper chest is well seated  Abdominal:      Palpations: Abdomen is soft. There is no mass (RUQ fullness/hepatomegaly?).      Tenderness: There is no abdominal tenderness.   Musculoskeletal:         General: Swelling present. Normal range of motion.      Cervical back: Normal range of motion.      Right lower leg: Edema (1+ (chronic)- wearing an ankle brace for prior ankle injury) present.      Left lower leg: Edema (1+ (chronic)) present.   Lymphadenopathy:      Cervical: No cervical adenopathy.   Skin:     General: Skin is warm.   Neurological:      General: No focal deficit present.      Mental Status: He is alert and oriented to person, place, and time.   Psychiatric:         Mood and Affect: Mood normal.         Behavior: Behavior normal.         Thought Content: Thought content normal.           Assessment:   Rectal adenocarcinoma  --Original tumor stage:  At least TNM III (cT3, cN1, M0)  --Original tumor burden:  --11/9/23- Colonoscopy:  Rectal mass.  Malignant partially obstructing tumor " "from 4 to 11 cm proximal to the anus.  Biopsied.  Exam otherwise normal on direct and retroflexion views.  Final diagnosis:Large intestine, designated \"rectal mass\".  Invasive adenocarcinoma, well to moderately differentiated.  --12/1/23- CT CAP- No mets in chest.  Thickening of the wall of the rectum consistent with the history of rectal neoplasm. The outer wall of the rectum is somewhat ill-defined. Therefore, there may be some extension through the wall. In addition, the fat plane between the rectum and prostate is obscured suggesting possible extension of tumor into this area. There is a 5 mm lymph node in the left perirectal fat image 71 series 3. There is an additional small lymph node more superiorly in the pelvic mesenteric fat measuring 6-7 mm in short axis diameter. No other evidence of mets.  --Complications of tumor:  --Rectal bleeding, anemia  --Tumor status:  --Untreated    2.   Anemia. Iron deficiency from rectal bleeding/malignancy  --11/20/23- fe 46 (L), fe sat 13% (L), ferritin 24 (L)  3.   Diabetes on insulin  4.   Arthritis  5.   BPH  6.   SARAHI  7.   Advanced age  8.   Borderline PS (ECOG 2)    Plan:  Apprised of baseline labs with microcytic anemia otherwise normal CBC, essentially normal CMP,  repleted B12/folate, but depressed iron levels and elevated CEA (15)  Review CT scans of the chest, abdomen/pelvis, 12/1/23 (above).  \"There may be some extension through the wall. In addition, the fat plane between the rectum and prostate is obscured suggesting possible extension of tumor into this area. There is a 5 mm lymph node in the left perirectal fat and small lymph node more superiorly in the pelvic mesenteric fat measuring 6-7 mm in short axis diameter...no other mets.\"    Refer to Dr. Calderon Cano Re:  Rectal EUS for T + N staging assessment    4.   Request that pathology send the tumor specimen from, 11/19/23 for MMR/ MSI.     5.   Review NCCN guidelines 2.2023 rectal cancer-for clinical stage " "T4, N any - pMMR/LORENZO- primary treatment - total neoadjuvant therapy.  FOLFOX or [place] ox- 12-16 weeks)-long course chemo/RT with capecitabine or infusional 1-ZA-qjfvgehup (best tumor response 8 weeks after completion of RT)- reassess for surgery (Transabdominal resection or of CR consider observation.  If resection contraindciated-systemic therapy; If dMMR/MSI-H- primary treatment- NEOADJUVANT/DEFINITIVE IMMUNOTHERAPY (PREFERRED)- IO therapy for up to 6 months (nivolumab or pembrolizumab); OR Total neoadjuvant therapy (as above)     6.   Provide teaching sheets for 5-FU, and oxaliplatin.  Discuss with patient: FOLFOX (5-FU and oxaliplatin) chemotherapy (to include but not limited to: Myelosuppression (leukopenia, anemia, thrombocytopenia), agranulocytosis, hemorrhage, GI bleed, stomatitis, coronary vasospasm, myocardial ischemia, anaphylaxis, nausea, vomiting, diarrhea, anorexia, hand-foot syndrome, alopecia, dermatitis, photosensitivity, skin ulcers, acute cerebellar syndrome, headache, confusion, ocular irritation, hyperlacrimation, nail discoloration; oxaliplatin: Hypersensitivity reactions, anaphylaxis, laryngospasm, thromboembolism, myelosuppression (pancytopenia), hemolytic uremic syndrome, pulmonary fibrosis, interstitial lung disease, hepatotoxicity, pancreatitis, ileus, GI bleed, nausea/vomiting, severe diarrhea, metabolic acidosis, acute renal failure, reversible posterior leukoencephalopathy syndrome, peripheral neuropathy, optic neuritis, deafness, seizures, stomatitis, liver enzyme abnormalities, anorexia, cough, constipation, belly pain, fever, infection, dyspnea, epistaxis, edema, taste changes, myalgias, hyperglycemia, headache, insomnia, dyspepsia, back pain, injection site reaction, weight loss, peripheral edema, pharyngolaryngeal dysesthesia).  Questions answered.  He tentatively agrees to a trial of therapy but is contemplating a comfort care approach.  \"At my age I don't think I can tolerate all " "that needs to be done with chemo then radiation then surgery.\" They will discuss and let us know.  Will refer to palliative care if he decides to forego therapy.     7.   Schedule treatment - C1, ; C2, ; C3,  at [facility]:  To be administered every 2 weeks x 6 cycles- due to age, comorbidities and PS will dose reduce 20% up front from C1     BSA =    D1:   Oxaliplatin 85 mg/m2 IVPB over 2 hours (TD =  mg).   Precede and follow Oxaliplatin with: Ca Gluconate 2 gm and MgSO4 2 gm in 250 mL D5W IVPB over 30 min.   Leucovorin 400 mg/m2 IVPB over 2 hours (TD= mg).   5- mg/ m2 IVP (TD =  mg).   Begin 5-FU 2400? mg/m2 IVPB over 46 hours. (TD=  mg).                --Premedicate with:   Aloxi 0.25 mg IV   Emend 130 mg IV   Decadron 10 mg IV      8.  Upon initiation of chemo:  CMP and CBC weekly, with Procrit 40,000 if hemoglobin less than 10 and hematocrit less than 30 and Zarxio/Neupogen 480 mcg subcutaneously x5 days if ANC less than 1.0 at [facility]     9. Rx:   Zofran 8 mg p.o. 3 times daily #30        10. Refer to radiation oncology Re:  Assess for concurrent RT+5-FU after FOLFOX  11. Refer to Dr. Gali Kelly - colorectal surgery at [place]   12. Venofer 200 mg IV daily x 4 as ordered  13. Return to office in 4 weeks with preoffice CBC, diff and CMP    I spent ~86 minutes caring for Juliocesar on this date of service. This time includes time spent by me in the following activities: preparing for the visit, reviewing tests, performing a medically appropriate examination and/or evaluation, counseling and educating the patient/family/caregiver, ordering medications, tests, or procedures and documenting information in the medical record.       "

## 2023-11-27 ENCOUNTER — OFFICE VISIT (OUTPATIENT)
Dept: SURGERY | Facility: CLINIC | Age: 88
End: 2023-11-27
Payer: MEDICARE

## 2023-11-27 ENCOUNTER — PRE-ADMISSION TESTING (OUTPATIENT)
Dept: PREADMISSION TESTING | Facility: HOSPITAL | Age: 88
End: 2023-11-27
Payer: MEDICARE

## 2023-11-27 ENCOUNTER — HOSPITAL ENCOUNTER (OUTPATIENT)
Dept: GENERAL RADIOLOGY | Facility: HOSPITAL | Age: 88
Discharge: HOME | End: 2023-11-27
Payer: MEDICARE

## 2023-11-27 VITALS
DIASTOLIC BLOOD PRESSURE: 65 MMHG | WEIGHT: 185 LBS | HEART RATE: 85 BPM | BODY MASS INDEX: 29.03 KG/M2 | OXYGEN SATURATION: 90 % | SYSTOLIC BLOOD PRESSURE: 158 MMHG | HEIGHT: 67 IN

## 2023-11-27 VITALS
HEART RATE: 81 BPM | WEIGHT: 185.63 LBS | DIASTOLIC BLOOD PRESSURE: 49 MMHG | HEIGHT: 70 IN | OXYGEN SATURATION: 92 % | BODY MASS INDEX: 26.57 KG/M2 | RESPIRATION RATE: 18 BRPM | SYSTOLIC BLOOD PRESSURE: 145 MMHG

## 2023-11-27 DIAGNOSIS — C20 RECTAL ADENOCARCINOMA: ICD-10-CM

## 2023-11-27 DIAGNOSIS — Z45.2 ENCOUNTER FOR CARE RELATED TO VASCULAR ACCESS PORT: ICD-10-CM

## 2023-11-27 DIAGNOSIS — E66.3 OVERWEIGHT (BMI 25.0-29.9): ICD-10-CM

## 2023-11-27 DIAGNOSIS — Z45.2 ENCOUNTER FOR CARE RELATED TO VASCULAR ACCESS PORT: Primary | ICD-10-CM

## 2023-11-27 PROCEDURE — 1159F MED LIST DOCD IN RCRD: CPT

## 2023-11-27 PROCEDURE — 71045 X-RAY EXAM CHEST 1 VIEW: CPT

## 2023-11-27 PROCEDURE — 93005 ELECTROCARDIOGRAM TRACING: CPT

## 2023-11-27 PROCEDURE — 87081 CULTURE SCREEN ONLY: CPT

## 2023-11-27 PROCEDURE — 1160F RVW MEDS BY RX/DR IN RCRD: CPT

## 2023-11-27 PROCEDURE — 99204 OFFICE O/P NEW MOD 45 MIN: CPT

## 2023-11-27 RX ORDER — LISINOPRIL 2.5 MG/1
2.5 TABLET ORAL DAILY
COMMUNITY

## 2023-11-27 RX ORDER — HYDROCORTISONE ACETATE 25 MG/1
25 SUPPOSITORY RECTAL 2 TIMES DAILY
COMMUNITY

## 2023-11-27 RX ORDER — TIMOLOL MALEATE 5 MG/1
5 TABLET ORAL 2 TIMES DAILY
COMMUNITY

## 2023-11-27 RX ORDER — CETIRIZINE HYDROCHLORIDE 10 MG/1
10 TABLET ORAL DAILY
COMMUNITY

## 2023-11-27 RX ORDER — ASPIRIN 81 MG/1
81 TABLET ORAL DAILY
COMMUNITY

## 2023-11-27 RX ORDER — AZELASTINE 1 MG/ML
1 SPRAY, METERED NASAL 2 TIMES DAILY
COMMUNITY

## 2023-11-27 RX ORDER — LANOLIN ALCOHOL/MO/W.PET/CERES
400 CREAM (GRAM) TOPICAL DAILY
COMMUNITY

## 2023-11-27 RX ORDER — DICYCLOMINE HYDROCHLORIDE 10 MG/1
10 CAPSULE ORAL
COMMUNITY

## 2023-11-27 RX ORDER — KETOCONAZOLE 20 MG/G
CREAM TOPICAL DAILY
COMMUNITY

## 2023-11-27 RX ORDER — MENTHOL AND ZINC OXIDE .44; 20.625 G/100G; G/100G
OINTMENT TOPICAL DAILY
COMMUNITY

## 2023-11-27 RX ORDER — OLOPATADINE HYDROCHLORIDE 1 MG/ML
1 SOLUTION/ DROPS OPHTHALMIC 2 TIMES DAILY
COMMUNITY

## 2023-11-27 NOTE — DISCHARGE INSTRUCTIONS
Before you come to the hospital        Arrival time: AS DIRECTED BY OFFICE     YOU MAY TAKE THE FOLLOWING MEDICATION(S) THE MORNING OF SURGERY WITH A SIP OF WATER: AS MD HAS DIRECTED; HOLD LISINOPRIL 24 HOURS PRIOR TO SURGERY; OK TO TAKE TIMOLOL MORNING OF SURGERY WITH A SIP OF WATER            ALL OTHER HOME MEDICATION CHECK WITH YOUR PHYSICIAN (especially if   you are taking diabetes medicines or blood thinners)    Do not take any Erectile Dysfunction medications (EX: CIALIS, VIAGRA) 24 hours prior to surgery.      If you were given and instructed to use a germ- killing soap, use as directed the night before surgery and again the morning of surgery or as directed by your surgeon. (Use one-half of the bottle with each shower.)   See attached information for How to Use Chlorhexidine for Bathing if applicable.            Eating and drinking restrictions prior to scheduled arrival time    2 Hours before arrival time STOP   Drinking Clear liquids (water, black coffee-NO CREAM,  apple juice-no pulp)    Clear Liquids    Water and flavored water                                                                      Clear Fruit juices, such as cranberry juice and apple juice.  Black coffee (NO cream of any kind, including powdered).  Plain tea  Clear bouillon or broth.  Flavored gelatin.  Soda.  Gatorade or Powerade.    8 Hours before arrival time STOP   All food, full liquids, and dairy products  Full liquid examples  Juices that have pulp.  Frozen ice pops that contain fruit pieces.  Coffee with creamer  Milk.  Yogurt.    (It is extremely important that you follow these guidelines to prevent delay or cancelation of your procedure)                       MANAGING PAIN AFTER SURGERY    We know you are probably wondering what your pain will be like after surgery.  Following surgery it is unrealistic to expect you will not have pain.   Pain is how our bodies let us know that something is wrong or cautions us to be careful.   That said, our goal is to make your pain tolerable.    Methods we may use to treat your pain include (oral or IV medications, PCAs, epidurals, nerve blocks, etc.)   While some procedures require IV pain medications for a short time after surgery, transitioning to pain medications by mouth allows for better management of pain.   Your nurse will encourage you to take oral pain medications whenever possible.  IV medications work almost immediately, but only last a short while.  Taking medications by mouth allows for a more constant level of medication in your blood stream for a longer period of time.      Once your pain is out of control it is harder to get back under control.  It is important you are aware when your next dose of pain medication is due.  If you are admitted, your nurse may write the time of your next dose on the white board in your room to help you remember.      We are interested in your pain and encourage you to inform us about aggravating factors during your visit.   Many times a simple repositioning every few hours can make a big difference.    If your physician says it is okay, do not let your pain prevent you from getting out of bed. Be sure to call your nurse for assistance prior to getting up so you do not fall.      Before surgery, please decide your tolerable pain goal.  These faces help describe the pain ratings we use on a 0-10 scale.   Be prepared to tell us your goal and whether or not you take pain or anxiety medications at home.          Preparing for Surgery  Preparing for surgery is an important part of your care. It can make things go more smoothly and help you avoid complications. The steps leading up to surgery may vary among hospitals. Follow all instructions given to you by your health care providers. Ask questions if you do not understand something. Talk about any concerns that you have.  Here are some questions to consider asking before your surgery:  If my surgery is not an  emergency (is elective), when would be the best time to have the surgery?  What arrangements do I need to make for work, home, or school?  What will my recovery be like? How long will it be before I can return to normal activities?  Will I need to prepare my home? Will I need to arrange care for me or my children?  Should I expect to have pain after surgery? What are my pain management options? Are there nonmedical options that I can try for pain?  Tell a health care provider about:  Any allergies you have.  All medicines you are taking, including vitamins, herbs, eye drops, creams, and over-the-counter medicines.  Any problems you or family members have had with anesthetic medicines.  Any blood disorders you have.  Any surgeries you have had.  Any medical conditions you have.  Whether you are pregnant or may be pregnant.  What are the risks?  The risks and complications of surgery depend on the specific procedure that you have. Discuss all the risks with your health care providers before your surgery. Ask about common surgical complications, which may include:  Infection.  Bleeding or a need for blood replacement (transfusion).  Allergic reactions to medicines.  Damage to surrounding nerves, tissues, or structures.  A blood clot.  Scarring.  Failure of the surgery to correct the problem.  Follow these instructions before the procedure:  Several days or weeks before your procedure  You may have a physical exam by your primary health care provider to make sure it is safe for you to have surgery.  You may have testing. This may include a chest X-ray, blood and urine tests, electrocardiogram (ECG), or other testing.  Ask your health care provider about:  Changing or stopping your regular medicines. This is especially important if you are taking diabetes medicines or blood thinners.  Taking medicines such as aspirin and ibuprofen. These medicines can thin your blood. Do not take these medicines unless your health care  provider tells you to take them.  Taking over-the-counter medicines, vitamins, herbs, and supplements.  Do not use any products that contain nicotine or tobacco, such as cigarettes and e-cigarettes. If you need help quitting, ask your health care provider.  Avoid alcohol.  Ask your health care provider if there are exercises you can do to prepare for surgery.  Eat a healthy diet.   Plan to have someone 18 years of age or older to take you home from the hospital. We will need to verify your ride on the morning of surgery if you are being discharged home on the same day. Tell your ride to be expecting a call from the hospital prior to your procedure.   Plan to have a responsible adult care for you for at least 24 hours after you leave the hospital or clinic. This is important.  The day before your procedure  You may be given antibiotic medicine to take by mouth to help prevent infection. Take it as told by your health care provider.  You may be asked to shower with a germ-killing soap.  Follow instructions from your health care provider about eating and drinking restrictions. This includes gum, mints and hard candy.  Pack comfortable clothes according to your procedure.   The day of your procedure  You may need to take another shower with a germ-killing soap before you leave home in the morning.  With a small sip of water, take only the medicines that you are told to take.  Remove all jewelry including rings.   Leave anything you consider valuable at home except hearing aids if needed.  You do not need to bring your home medications into the hospital.   Do not wear any makeup, nail polish, powder, deodorant, lotion, hair accessories, or anything on your skin or body except your clothes.  If you will be staying in the hospital, bring a case to hold your glasses, contacts, or dentures. You may also want to bring your robe and non-skid footwear.       (Do not use denture adhesives since you will be asked to remove them  during  surgery).   If you wear oxygen at home, bring it with you the day of surgery.  If instructed by your health care provider, bring your sleep apnea device with you on the day of your surgery (if this applies to you).  You may want to leave your suitcase and sleep apnea device in the car until after surgery.   Arrive at the hospital as scheduled.  Bring a friend or family member with you who can help to answer questions and be present while you meet with your health care provider.  At the hospital  When you arrive at the hospital:  Go to registration located at the main entrance of the hospital. You will be registered and given a beeper and a sticker sheet. Take the stickers to the Outpatient nurses desk and place in the black tray. This is to notify staff that you have arrived. Then return to the lobby to wait.   When your beeper lights up and vibrates proceed through the double doors, under the stairs, and a member of the Outpatient Surgery staff will escort you to your preoperative room.  You may have to wear compression sleeves. These help to prevent blood clots and reduce swelling in your legs.  An IV may be inserted into one of your veins.              In the operating room, you may be given one or more of the following:        A medicine to help you relax (sedative).        A medicine to numb the area (local anesthetic).        A medicine to make you fall asleep (general anesthetic).        A medicine that is injected into an area of your body to numb everything below the                      injection site (regional anesthetic).  You may be given an antibiotic through your IV to help prevent infection.  Your surgical site will be marked or identified.    Contact a health care provider if you:  Develop a fever of more than 100.4°F (38°C) or other feelings of illness during the 48 hours before your surgery.  Have symptoms that get worse.  Have questions or concerns about your surgery.  Summary  Preparing  for surgery can make the procedure go more smoothly and lower your risk of complications.  Before surgery, make a list of questions and concerns to discuss with your surgeon. Ask about the risks and possible complications.  In the days or weeks before your surgery, follow all instructions from your health care provider. You may need to stop smoking, avoid alcohol, follow eating restrictions, and change or stop your regular medicines.  Contact your surgeon if you develop a fever or other signs of illness during the few days before your surgery.  This information is not intended to replace advice given to you by your health care provider. Make sure you discuss any questions you have with your health care provider.  Document Revised: 12/21/2018 Document Reviewed: 10/23/2018  [email or web address] Patient Education © 2021 [email or web address]         How to Use Chlorhexidine Before Surgery  Chlorhexidine gluconate (CHG) is a germ-killing (antiseptic) solution that is used to clean the skin. It can get rid of the bacteria that normally live on the skin and can keep them away for about 24 hours. To clean your skin with CHG, you may be given:  A CHG solution to use in the shower or as part of a sponge bath.  A prepackaged cloth that contains CHG.  Cleaning your skin with CHG may help lower the risk for infection:  While you are staying in the intensive care unit of the hospital.  If you have a vascular access, such as a central line, to provide short-term or long-term access to your veins.  If you have a catheter to drain urine from your bladder.  If you are on a ventilator. A ventilator is a machine that helps you breathe by moving air in and out of your lungs.  After surgery.  What are the risks?  Risks of using CHG include:  A skin reaction.  Hearing loss, if CHG gets in your ears and you have a perforated eardrum.  Eye injury, if CHG gets in your eyes and is not rinsed out.  The CHG product catching fire.  Make sure that you avoid smoking  and flames after applying CHG to your skin.  Do not use CHG:  If you have a chlorhexidine allergy or have previously reacted to chlorhexidine.  On babies younger than 2 months of age.  How to use CHG solution  Use CHG only as told by your health care provider, and follow the instructions on the label.  Use the full amount of CHG as directed. Usually, this is one bottle.  During a shower    Follow these steps when using CHG solution during a shower (unless your health care provider gives you different instructions):  Start the shower.  Use your normal soap and shampoo to wash your face and hair.  Turn off the shower or move out of the shower stream.  Pour the CHG onto a clean washcloth. Do not use any type of brush or rough-edged sponge.  Starting at your neck, lather your body down to your toes. Make sure you follow these instructions:  If you will be having surgery, pay special attention to the part of your body where you will be having surgery. Scrub this area for at least 1 minute.  Do not use CHG on your head or face. If the solution gets into your ears or eyes, rinse them well with water.  Avoid your genital area.  Avoid any areas of skin that have broken skin, cuts, or scrapes.  Scrub your back and under your arms. Make sure to wash skin folds.  Let the lather sit on your skin for 1-2 minutes or as long as told by your health care provider.  Thoroughly rinse your entire body in the shower. Make sure that all body creases and crevices are rinsed well.  Dry off with a clean towel. Do not put any substances on your body afterward--such as powder, lotion, or perfume--unless you are told to do so by your health care provider. Only use lotions that are recommended by the .  Put on clean clothes or pajamas.  If it is the night before your surgery, sleep in clean sheets.     During a sponge bath  Follow these steps when using CHG solution during a sponge bath (unless your health care provider gives you  different instructions):  Use your normal soap and shampoo to wash your face and hair.  Pour the CHG onto a clean washcloth.  Starting at your neck, lather your body down to your toes. Make sure you follow these instructions:  If you will be having surgery, pay special attention to the part of your body where you will be having surgery. Scrub this area for at least 1 minute.  Do not use CHG on your head or face. If the solution gets into your ears or eyes, rinse them well with water.  Avoid your genital area.  Avoid any areas of skin that have broken skin, cuts, or scrapes.  Scrub your back and under your arms. Make sure to wash skin folds.  Let the lather sit on your skin for 1-2 minutes or as long as told by your health care provider.  Using a different clean, wet washcloth, thoroughly rinse your entire body. Make sure that all body creases and crevices are rinsed well.  Dry off with a clean towel. Do not put any substances on your body afterward--such as powder, lotion, or perfume--unless you are told to do so by your health care provider. Only use lotions that are recommended by the .  Put on clean clothes or pajamas.  If it is the night before your surgery, sleep in clean sheets.  How to use CHG prepackaged cloths  Only use CHG cloths as told by your health care provider, and follow the instructions on the label.  Use the CHG cloth on clean, dry skin.  Do not use the CHG cloth on your head or face unless your health care provider tells you to.  When washing with the CHG cloth:  Avoid your genital area.  Avoid any areas of skin that have broken skin, cuts, or scrapes.  Before surgery    Follow these steps when using a CHG cloth to clean before surgery (unless your health care provider gives you different instructions):  Using the CHG cloth, vigorously scrub the part of your body where you will be having surgery. Scrub using a back-and-forth motion for 3 minutes. The area on your body should be  completely wet with CHG when you are done scrubbing.  Do not rinse. Discard the cloth and let the area air-dry. Do not put any substances on the area afterward, such as powder, lotion, or perfume.  Put on clean clothes or pajamas.  If it is the night before your surgery, sleep in clean sheets.     For general bathing  Follow these steps when using CHG cloths for general bathing (unless your health care provider gives you different instructions).  Use a separate CHG cloth for each area of your body. Make sure you wash between any folds of skin and between your fingers and toes. Wash your body in the following order, switching to a new cloth after each step:  The front of your neck, shoulders, and chest.  Both of your arms, under your arms, and your hands.  Your stomach and groin area, avoiding the genitals.  Your right leg and foot.  Your left leg and foot.  The back of your neck, your back, and your buttocks.  Do not rinse. Discard the cloth and let the area air-dry. Do not put any substances on your body afterward--such as powder, lotion, or perfume--unless you are told to do so by your health care provider. Only use lotions that are recommended by the .  Put on clean clothes or pajamas.  Contact a health care provider if:  Your skin gets irritated after scrubbing.  You have questions about using your solution or cloth.  You swallow any chlorhexidine. Call your local poison control center (1-504.496.1813 in the [place]  Get help right away if:  Your eyes itch badly, or they become very red or swollen.  Your skin itches badly and is red or swollen.  Your hearing changes.  You have trouble seeing.  You have swelling or tingling in your mouth or throat.  You have trouble breathing.  These symptoms may represent a serious problem that is an emergency. Do not wait to see if the symptoms will go away. Get medical help right away. Call your local emergency services (053 in the [place] Do not drive yourself to  the hospital.  Summary  Chlorhexidine gluconate (CHG) is a germ-killing (antiseptic) solution that is used to clean the skin. Cleaning your skin with CHG may help to lower your risk for infection.  You may be given CHG to use for bathing. It may be in a bottle or in a prepackaged cloth to use on your skin. Carefully follow your health care provider's instructions and the instructions on the product label.  Do not use CHG if you have a chlorhexidine allergy.  Contact your health care provider if your skin gets irritated after scrubbing.  This information is not intended to replace advice given to you by your health care provider. Make sure you discuss any questions you have with your health care provider.  Document Revised: 04/17/2023 Document Reviewed: 02/28/2022  [employer] Patient Education © 2023 [employer]

## 2023-11-27 NOTE — H&P (VIEW-ONLY)
Office New Patient History and Physical:     Referring Provider: Damian Abraham MD    No chief complaint on file.      Subjective .     History of present illness:  Juliocesar Tellez is a 89 y.o. male who presents to the clinic for port placement. Mr. Tellez was sent to our office by Dr. Abraham for a rectal cancer that Dr. Malik found on colonoscopy in November of 2023 after having bright red bleeding per rectum. Mr. Tellez has no history of port placement. He is a nonsmoker and is not on any blood thinners. He has an appointment with Dr. Abraham on 12/4/23 to start infusions.     BMI is 26.8. He is a nonsmoker. He does not take any blood thinners other than 81mg ASA.     History  Past Medical History:   Diagnosis Date    Arthritis     BPH (benign prostatic hyperplasia)     Cancer     Colon polyp     Diabetes mellitus     Head ache     Hemorrhoids     Hyperlipidemia     Hypertension     SARAHI (obstructive sleep apnea)     POAG (primary open-angle glaucoma)     Pseudophakia     Rectal bleeding     Sleep apnea    ,   Past Surgical History:   Procedure Laterality Date    COLONOSCOPY  12/31/2013    8 polyps, tubular adenoma, a few divertucula    COLONOSCOPY N/A 11/09/2023    Procedure: SIGMOIDOSCOPY FLEXIBLE;  Surgeon: Terry Malik MD;  Location: [facility] ENDOSCOPY;  Service: Gastroenterology;  Laterality: N/A;  preop; BRBPR  postop rectal mass  PCP Gerardo Prescott    TONSILLECTOMY     ,   Family History   Problem Relation Age of Onset    No Known Problems Mother     No Known Problems Father     Liver cancer Sister     Colon cancer Neg Hx    ,   Social History     Tobacco Use    Smoking status: Never    Smokeless tobacco: Never   Vaping Use    Vaping Use: Never used   Substance Use Topics    Alcohol use: Never    Drug use: Never   , (Not in a hospital admission)   and Allergies:  Sulfa antibiotics    Current Outpatient Medications:     aspirin 81 MG EC tablet, Take 1 tablet by mouth Daily., Disp: , Rfl:      atorvastatin (LIPITOR) 40 MG tablet, Take 1 tablet by mouth Daily., Disp: , Rfl:     azelastine (ASTELIN) 0.1 % nasal spray, 2 sprays into the nostril(s) as directed by provider 2 (Two) Times a Day. Use in each nostril as directed, Disp: , Rfl:     cetirizine (zyrTEC) 10 MG tablet, Take 1 tablet by mouth Daily., Disp: , Rfl:     dicyclomine (BENTYL) 10 MG capsule, Take 1 capsule by mouth 4 (Four) Times a Day Before Meals & at Bedtime., Disp: , Rfl:     glucagon (GLUCAGEN) 1 MG injection, Inject 1 mg under the skin into the appropriate area as directed 1 (One) Time As Needed for Low Blood Sugar. (Patient not taking: Reported on 11/27/2023), Disp: , Rfl:     hydrocortisone (ANUSOL-HC) 25 MG suppository, Insert 1 suppository into the rectum 2 (Two) Times a Day As Needed for Hemorrhoids (rectal discomfort). (Patient not taking: Reported on 11/27/2023), Disp: 14 suppository, Rfl: 0    insulin lispro protamine-insulin lispro (humaLOG 75-25) (75-25) 100 UNIT/ML suspension injection, Inject  under the skin into the appropriate area as directed Take As Directed. 60 MORNING 35 EVENING, Disp: , Rfl:     ketoconazole (NIZORAL) 2 % cream, Apply 1 application  topically to the appropriate area as directed Daily., Disp: , Rfl:     ketoconazole (NIZORAL) 2 % cream, Apply 1 application  topically to the appropriate area as directed Daily., Disp: , Rfl:     lisinopril (PRINIVIL,ZESTRIL) 2.5 MG tablet, Take 1 tablet by mouth Daily., Disp: , Rfl:     magnesium oxide (MAGOX) 400 (241.3 Mg) MG tablet tablet, Take 1 tablet by mouth Daily., Disp: , Rfl:     Menthol-Zinc Oxide (Calmoseptine) 0.44-20.6 % ointment, Apply 1 application  topically to the appropriate area as directed 2 (Two) Times a Day., Disp: , Rfl:     Olopatadine HCl (PATADAY OP), Apply  to eye(s) as directed by provider., Disp: , Rfl:     tamsulosin (FLOMAX) 0.4 MG capsule 24 hr capsule, TAKE ONE CAPSULE DAILY, Disp: , Rfl:     timolol (BLOCADREN) 5 MG tablet, Take 1 tablet  "by mouth 2 (Two) Times a Day., Disp: , Rfl:     TRAVOPROST OP, Apply  to eye(s) as directed by provider., Disp: , Rfl:     Objective     Vital Signs   /65 (BP Location: Left arm, Patient Position: Sitting, Cuff Size: Adult)   Pulse 85   Ht 170.2 cm (67.01\")   Wt 83.9 kg (185 lb)   SpO2 90%   BMI 28.97 kg/m²      Physical Exam:  General appearance - alert, well appearing, and in no distress, oriented to person, place, and time, and normal appearing weight  Mental status - alert, oriented to person, place, and time, normal mood, behavior, speech, dress, motor activity, and thought processes  Eyes - pupils equal and reactive, extraocular eye movements intact, sclera anicteric  Chest - no tachypnea, retractions or cyanosis  Heart - normal rate and regular rhythm  Neurological - alert, oriented, normal speech, no focal findings or movement disorder noted  Skin - normal coloration and turgor, no rashes, no suspicious skin lesions noted    Results Review:  Result Review :            Assessment & Plan       Diagnoses and all orders for this visit:    1. Encounter for care related to vascular access port (Primary)  -     Case Request; Standing  -     MRSA Screen Culture (Outpatient) - Swab, Nares; Future  -     XR chest 1 vw; Future  -     ECG 12 Lead; Future  -     ceFAZolin (ANCEF) 2,000 mg in sodium chloride 0.9 % 100 mL IVPB  -     Case Request    2. Rectal adenocarcinoma  -     Case Request; Standing  -     MRSA Screen Culture (Outpatient) - Swab, Nares; Future  -     XR chest 1 vw; Future  -     ECG 12 Lead; Future  -     ceFAZolin (ANCEF) 2,000 mg in sodium chloride 0.9 % 100 mL IVPB  -     Case Request    3. Overweight (BMI 25.0-29.9)    Other orders  -     Follow Anesthesia Guidelines / Protocol; Future  -     Follow Anesthesia Guidelines / Protocol; Standing  -     Verify / Perform Chlorhexidine Skin Prep; Standing  -     Verify / Perform Chlorhexidine Skin Prep if Indicated (If Not Already Completed); " Standing  -     Obtain Informed Consent; Future  -     Provide NPO Instructions to Patient; Future  -     Chlorhexidine Skin Prep; Future  -     Notify physician (specify); Standing  -     Instructions on coughing, deep breathing, and incentive spirometry.; Standing  -     Oxygen Therapy-; Standing      After a discussion of risks (including bleeding, port infection with need for removal, damage to surrounding structures including the arteries, pneumothorax and possible port malfunction) and benefits, the patient wishes to proceed with single lumen port placement with fluoroscopy. The patient is currently scheduled for this procedure on 11/29/23. We will plan to place it on the right.       This is a life threatening condition. I have ordered a CBC, CMP, CXR And EKG. He is at increased risk of perioperative complications due to his active cancer status.        Follow up:     BMI is >= 25 and <30. (Overweight) The following options were offered after discussion;: weight loss educational material (shared in after visit summary)    Return if symptoms worsen or fail to improve.      Rhonda Cannon PA-C  11/27/23  14:23 CST

## 2023-11-27 NOTE — PATIENT INSTRUCTIONS
Surgery scheduled: 11/29/2023; Arrive @ 5:00 am  Pre-work: 11/27/2023 @ 12:30   NPO after midnight the night before surgery  Check in at the main registration for pre-work and surgery  Stop any blood thinning medication 48 hours before surgery

## 2023-11-27 NOTE — PROGRESS NOTES
Office New Patient History and Physical:     Referring Provider: Damian Abraham MD    No chief complaint on file.      Subjective .     History of present illness:  Juliocesar Tellez is a 89 y.o. male who presents to the clinic for port placement. Mr. Tellez was sent to our office by Dr. Abraham for a rectal cancer that Dr. Malik found on colonoscopy in November of 2023 after having bright red bleeding per rectum. Mr. Tellez has no history of port placement. He is a nonsmoker and is not on any blood thinners. He has an appointment with Dr. Abraham on 12/4/23 to start infusions.     BMI is 26.8. He is a nonsmoker. He does not take any blood thinners other than 81mg ASA.     History  Past Medical History:   Diagnosis Date    Arthritis     BPH (benign prostatic hyperplasia)     Cancer     Colon polyp     Diabetes mellitus     Head ache     Hemorrhoids     Hyperlipidemia     Hypertension     SARAHI (obstructive sleep apnea)     POAG (primary open-angle glaucoma)     Pseudophakia     Rectal bleeding     Sleep apnea    ,   Past Surgical History:   Procedure Laterality Date    COLONOSCOPY  12/31/2013    8 polyps, tubular adenoma, a few divertucula    COLONOSCOPY N/A 11/09/2023    Procedure: SIGMOIDOSCOPY FLEXIBLE;  Surgeon: Terry Malik MD;  Location: [facility] ENDOSCOPY;  Service: Gastroenterology;  Laterality: N/A;  preop; BRBPR  postop rectal mass  PCP Gerardo Prescott    TONSILLECTOMY     ,   Family History   Problem Relation Age of Onset    No Known Problems Mother     No Known Problems Father     Liver cancer Sister     Colon cancer Neg Hx    ,   Social History     Tobacco Use    Smoking status: Never    Smokeless tobacco: Never   Vaping Use    Vaping Use: Never used   Substance Use Topics    Alcohol use: Never    Drug use: Never   , (Not in a hospital admission)   and Allergies:  Sulfa antibiotics    Current Outpatient Medications:     aspirin 81 MG EC tablet, Take 1 tablet by mouth Daily., Disp: , Rfl:      atorvastatin (LIPITOR) 40 MG tablet, Take 1 tablet by mouth Daily., Disp: , Rfl:     azelastine (ASTELIN) 0.1 % nasal spray, 2 sprays into the nostril(s) as directed by provider 2 (Two) Times a Day. Use in each nostril as directed, Disp: , Rfl:     cetirizine (zyrTEC) 10 MG tablet, Take 1 tablet by mouth Daily., Disp: , Rfl:     dicyclomine (BENTYL) 10 MG capsule, Take 1 capsule by mouth 4 (Four) Times a Day Before Meals & at Bedtime., Disp: , Rfl:     glucagon (GLUCAGEN) 1 MG injection, Inject 1 mg under the skin into the appropriate area as directed 1 (One) Time As Needed for Low Blood Sugar. (Patient not taking: Reported on 11/27/2023), Disp: , Rfl:     hydrocortisone (ANUSOL-HC) 25 MG suppository, Insert 1 suppository into the rectum 2 (Two) Times a Day As Needed for Hemorrhoids (rectal discomfort). (Patient not taking: Reported on 11/27/2023), Disp: 14 suppository, Rfl: 0    insulin lispro protamine-insulin lispro (humaLOG 75-25) (75-25) 100 UNIT/ML suspension injection, Inject  under the skin into the appropriate area as directed Take As Directed. 60 MORNING 35 EVENING, Disp: , Rfl:     ketoconazole (NIZORAL) 2 % cream, Apply 1 application  topically to the appropriate area as directed Daily., Disp: , Rfl:     ketoconazole (NIZORAL) 2 % cream, Apply 1 application  topically to the appropriate area as directed Daily., Disp: , Rfl:     lisinopril (PRINIVIL,ZESTRIL) 2.5 MG tablet, Take 1 tablet by mouth Daily., Disp: , Rfl:     magnesium oxide (MAGOX) 400 (241.3 Mg) MG tablet tablet, Take 1 tablet by mouth Daily., Disp: , Rfl:     Menthol-Zinc Oxide (Calmoseptine) 0.44-20.6 % ointment, Apply 1 application  topically to the appropriate area as directed 2 (Two) Times a Day., Disp: , Rfl:     Olopatadine HCl (PATADAY OP), Apply  to eye(s) as directed by provider., Disp: , Rfl:     tamsulosin (FLOMAX) 0.4 MG capsule 24 hr capsule, TAKE ONE CAPSULE DAILY, Disp: , Rfl:     timolol (BLOCADREN) 5 MG tablet, Take 1 tablet  "by mouth 2 (Two) Times a Day., Disp: , Rfl:     TRAVOPROST OP, Apply  to eye(s) as directed by provider., Disp: , Rfl:     Objective     Vital Signs   /65 (BP Location: Left arm, Patient Position: Sitting, Cuff Size: Adult)   Pulse 85   Ht 170.2 cm (67.01\")   Wt 83.9 kg (185 lb)   SpO2 90%   BMI 28.97 kg/m²      Physical Exam:  General appearance - alert, well appearing, and in no distress, oriented to person, place, and time, and normal appearing weight  Mental status - alert, oriented to person, place, and time, normal mood, behavior, speech, dress, motor activity, and thought processes  Eyes - pupils equal and reactive, extraocular eye movements intact, sclera anicteric  Chest - no tachypnea, retractions or cyanosis  Heart - normal rate and regular rhythm  Neurological - alert, oriented, normal speech, no focal findings or movement disorder noted  Skin - normal coloration and turgor, no rashes, no suspicious skin lesions noted    Results Review:  Result Review :            Assessment & Plan       Diagnoses and all orders for this visit:    1. Encounter for care related to vascular access port (Primary)  -     Case Request; Standing  -     MRSA Screen Culture (Outpatient) - Swab, Nares; Future  -     XR chest 1 vw; Future  -     ECG 12 Lead; Future  -     ceFAZolin (ANCEF) 2,000 mg in sodium chloride 0.9 % 100 mL IVPB  -     Case Request    2. Rectal adenocarcinoma  -     Case Request; Standing  -     MRSA Screen Culture (Outpatient) - Swab, Nares; Future  -     XR chest 1 vw; Future  -     ECG 12 Lead; Future  -     ceFAZolin (ANCEF) 2,000 mg in sodium chloride 0.9 % 100 mL IVPB  -     Case Request    3. Overweight (BMI 25.0-29.9)    Other orders  -     Follow Anesthesia Guidelines / Protocol; Future  -     Follow Anesthesia Guidelines / Protocol; Standing  -     Verify / Perform Chlorhexidine Skin Prep; Standing  -     Verify / Perform Chlorhexidine Skin Prep if Indicated (If Not Already Completed); " Standing  -     Obtain Informed Consent; Future  -     Provide NPO Instructions to Patient; Future  -     Chlorhexidine Skin Prep; Future  -     Notify physician (specify); Standing  -     Instructions on coughing, deep breathing, and incentive spirometry.; Standing  -     Oxygen Therapy-; Standing      After a discussion of risks (including bleeding, port infection with need for removal, damage to surrounding structures including the arteries, pneumothorax and possible port malfunction) and benefits, the patient wishes to proceed with single lumen port placement with fluoroscopy. The patient is currently scheduled for this procedure on 11/29/23. We will plan to place it on the right.       This is a life threatening condition. I have ordered a CBC, CMP, CXR And EKG. He is at increased risk of perioperative complications due to his active cancer status.        Follow up:     BMI is >= 25 and <30. (Overweight) The following options were offered after discussion;: weight loss educational material (shared in after visit summary)    Return if symptoms worsen or fail to improve.      Rhonda Cannon PA-C  11/27/23  14:23 CST

## 2023-11-28 LAB
MRSA SPEC QL CULT: NORMAL
QT INTERVAL: 418 MS
QTC INTERVAL: 444 MS

## 2023-11-29 ENCOUNTER — ANESTHESIA (OUTPATIENT)
Dept: PERIOP | Facility: HOSPITAL | Age: 88
End: 2023-11-29
Payer: MEDICARE

## 2023-11-29 ENCOUNTER — HOSPITAL ENCOUNTER (OUTPATIENT)
Facility: HOSPITAL | Age: 88
Setting detail: HOSPITAL OUTPATIENT SURGERY
Discharge: HOME | End: 2023-11-29
Attending: STUDENT IN AN ORGANIZED HEALTH CARE EDUCATION/TRAINING PROGRAM | Admitting: STUDENT IN AN ORGANIZED HEALTH CARE EDUCATION/TRAINING PROGRAM
Payer: MEDICARE

## 2023-11-29 ENCOUNTER — APPOINTMENT (OUTPATIENT)
Dept: GENERAL RADIOLOGY | Facility: HOSPITAL | Age: 88
End: 2023-11-29
Payer: MEDICARE

## 2023-11-29 ENCOUNTER — ANESTHESIA EVENT (OUTPATIENT)
Dept: PERIOP | Facility: HOSPITAL | Age: 88
End: 2023-11-29
Payer: MEDICARE

## 2023-11-29 VITALS
OXYGEN SATURATION: 95 % | DIASTOLIC BLOOD PRESSURE: 47 MMHG | SYSTOLIC BLOOD PRESSURE: 122 MMHG | RESPIRATION RATE: 16 BRPM | TEMPERATURE: 97.8 F | HEART RATE: 67 BPM

## 2023-11-29 DIAGNOSIS — Z45.2 ENCOUNTER FOR CARE RELATED TO VASCULAR ACCESS PORT: ICD-10-CM

## 2023-11-29 DIAGNOSIS — C20 RECTAL ADENOCARCINOMA: ICD-10-CM

## 2023-11-29 LAB
GLUCOSE BLDC GLUCOMTR-MCNC: 151 MG/DL (ref 70–130)
GLUCOSE BLDC GLUCOMTR-MCNC: 74 MG/DL (ref 70–130)
GLUCOSE BLDC GLUCOMTR-MCNC: 82 MG/DL (ref 70–130)

## 2023-11-29 PROCEDURE — 25810000003 LACTATED RINGERS PER 1000 ML: Performed by: STUDENT IN AN ORGANIZED HEALTH CARE EDUCATION/TRAINING PROGRAM

## 2023-11-29 PROCEDURE — 25010000002 CEFAZOLIN PER 500 MG

## 2023-11-29 PROCEDURE — 25010000002 HEPARIN LOCK FLUSH PER 10 UNITS: Performed by: STUDENT IN AN ORGANIZED HEALTH CARE EDUCATION/TRAINING PROGRAM

## 2023-11-29 PROCEDURE — 82948 REAGENT STRIP/BLOOD GLUCOSE: CPT

## 2023-11-29 PROCEDURE — 76000 FLUOROSCOPY <1 HR PHYS/QHP: CPT

## 2023-11-29 PROCEDURE — 25810000003 SODIUM CHLORIDE PER 500 ML: Performed by: STUDENT IN AN ORGANIZED HEALTH CARE EDUCATION/TRAINING PROGRAM

## 2023-11-29 PROCEDURE — C1788 PORT, INDWELLING, IMP: HCPCS | Performed by: STUDENT IN AN ORGANIZED HEALTH CARE EDUCATION/TRAINING PROGRAM

## 2023-11-29 PROCEDURE — 25010000002 PROPOFOL 10 MG/ML EMULSION: Performed by: NURSE ANESTHETIST, CERTIFIED REGISTERED

## 2023-11-29 DEVICE — PRT INTRO VASC/INTERV VORTEX FILL/HL DETACH/POLYURET/CATH 8F: Type: IMPLANTABLE DEVICE | Site: SUBCLAVIAN | Status: FUNCTIONAL

## 2023-11-29 RX ORDER — SODIUM CHLORIDE 9 MG/ML
INJECTION, SOLUTION INTRAVENOUS AS NEEDED
Status: DISCONTINUED | OUTPATIENT
Start: 2023-11-29 | End: 2023-11-29 | Stop reason: HOSPADM

## 2023-11-29 RX ORDER — SODIUM CHLORIDE, SODIUM LACTATE, POTASSIUM CHLORIDE, CALCIUM CHLORIDE 600; 310; 30; 20 MG/100ML; MG/100ML; MG/100ML; MG/100ML
100 INJECTION, SOLUTION INTRAVENOUS CONTINUOUS
Status: DISCONTINUED | OUTPATIENT
Start: 2023-11-29 | End: 2023-11-29 | Stop reason: HOSPADM

## 2023-11-29 RX ORDER — TRAMADOL HYDROCHLORIDE 50 MG/1
50 TABLET ORAL EVERY 8 HOURS PRN
Qty: 5 TABLET | Refills: 0 | Status: SHIPPED | OUTPATIENT
Start: 2023-11-29 | End: 2023-12-02

## 2023-11-29 RX ORDER — SODIUM CHLORIDE 9 MG/ML
40 INJECTION, SOLUTION INTRAVENOUS AS NEEDED
Status: DISCONTINUED | OUTPATIENT
Start: 2023-11-29 | End: 2023-11-29 | Stop reason: HOSPADM

## 2023-11-29 RX ORDER — FENTANYL CITRATE 50 UG/ML
25 INJECTION, SOLUTION INTRAMUSCULAR; INTRAVENOUS
Status: DISCONTINUED | OUTPATIENT
Start: 2023-11-29 | End: 2023-11-29 | Stop reason: HOSPADM

## 2023-11-29 RX ORDER — FAMOTIDINE 10 MG/ML
20 INJECTION, SOLUTION INTRAVENOUS AS NEEDED
OUTPATIENT
Start: 2023-12-27

## 2023-11-29 RX ORDER — FAMOTIDINE 10 MG/ML
20 INJECTION, SOLUTION INTRAVENOUS AS NEEDED
OUTPATIENT
Start: 2023-12-18

## 2023-11-29 RX ORDER — ACETAMINOPHEN 325 MG/1
975 TABLET ORAL EVERY 8 HOURS
Start: 2023-11-29 | End: 2024-11-28

## 2023-11-29 RX ORDER — HEPARIN SODIUM (PORCINE) LOCK FLUSH IV SOLN 100 UNIT/ML 100 UNIT/ML
SOLUTION INTRAVENOUS AS NEEDED
Status: DISCONTINUED | OUTPATIENT
Start: 2023-11-29 | End: 2023-11-29 | Stop reason: HOSPADM

## 2023-11-29 RX ORDER — DIPHENHYDRAMINE HYDROCHLORIDE 50 MG/ML
50 INJECTION INTRAMUSCULAR; INTRAVENOUS AS NEEDED
OUTPATIENT
Start: 2023-12-11

## 2023-11-29 RX ORDER — PROPOFOL 10 MG/ML
VIAL (ML) INTRAVENOUS AS NEEDED
Status: DISCONTINUED | OUTPATIENT
Start: 2023-11-29 | End: 2023-11-29 | Stop reason: SURG

## 2023-11-29 RX ORDER — DIPHENHYDRAMINE HYDROCHLORIDE 50 MG/ML
50 INJECTION INTRAMUSCULAR; INTRAVENOUS AS NEEDED
OUTPATIENT
Start: 2023-12-27

## 2023-11-29 RX ORDER — SODIUM CHLORIDE 0.9 % (FLUSH) 0.9 %
3 SYRINGE (ML) INJECTION AS NEEDED
Status: DISCONTINUED | OUTPATIENT
Start: 2023-11-29 | End: 2023-11-29 | Stop reason: HOSPADM

## 2023-11-29 RX ORDER — SODIUM CHLORIDE 9 MG/ML
20 INJECTION, SOLUTION INTRAVENOUS ONCE
OUTPATIENT
Start: 2023-12-11

## 2023-11-29 RX ORDER — LIDOCAINE HYDROCHLORIDE 10 MG/ML
0.5 INJECTION, SOLUTION EPIDURAL; INFILTRATION; INTRACAUDAL; PERINEURAL ONCE AS NEEDED
Status: DISCONTINUED | OUTPATIENT
Start: 2023-11-29 | End: 2023-11-29 | Stop reason: HOSPADM

## 2023-11-29 RX ORDER — SODIUM CHLORIDE 0.9 % (FLUSH) 0.9 %
3-10 SYRINGE (ML) INJECTION AS NEEDED
Status: DISCONTINUED | OUTPATIENT
Start: 2023-11-29 | End: 2023-11-29 | Stop reason: HOSPADM

## 2023-11-29 RX ORDER — ONDANSETRON 4 MG/1
4 TABLET, FILM COATED ORAL EVERY 8 HOURS PRN
Qty: 15 TABLET | Refills: 0 | Status: SHIPPED | OUTPATIENT
Start: 2023-11-29 | End: 2024-11-28

## 2023-11-29 RX ORDER — ONDANSETRON 2 MG/ML
4 INJECTION INTRAMUSCULAR; INTRAVENOUS ONCE AS NEEDED
Status: DISCONTINUED | OUTPATIENT
Start: 2023-11-29 | End: 2023-11-29 | Stop reason: HOSPADM

## 2023-11-29 RX ORDER — NALOXONE HCL 0.4 MG/ML
0.4 VIAL (ML) INJECTION AS NEEDED
Status: DISCONTINUED | OUTPATIENT
Start: 2023-11-29 | End: 2023-11-29 | Stop reason: HOSPADM

## 2023-11-29 RX ORDER — HYDROCODONE BITARTRATE AND ACETAMINOPHEN 10; 325 MG/1; MG/1
1 TABLET ORAL EVERY 4 HOURS PRN
Status: DISCONTINUED | OUTPATIENT
Start: 2023-11-29 | End: 2023-11-29 | Stop reason: HOSPADM

## 2023-11-29 RX ORDER — SODIUM CHLORIDE 9 MG/ML
20 INJECTION, SOLUTION INTRAVENOUS ONCE
OUTPATIENT
Start: 2023-12-27

## 2023-11-29 RX ORDER — SODIUM CHLORIDE, SODIUM LACTATE, POTASSIUM CHLORIDE, CALCIUM CHLORIDE 600; 310; 30; 20 MG/100ML; MG/100ML; MG/100ML; MG/100ML
1000 INJECTION, SOLUTION INTRAVENOUS CONTINUOUS
Status: DISCONTINUED | OUTPATIENT
Start: 2023-11-29 | End: 2023-11-29 | Stop reason: HOSPADM

## 2023-11-29 RX ORDER — FAMOTIDINE 10 MG/ML
20 INJECTION, SOLUTION INTRAVENOUS AS NEEDED
OUTPATIENT
Start: 2023-12-11

## 2023-11-29 RX ORDER — SODIUM CHLORIDE 0.9 % (FLUSH) 0.9 %
3 SYRINGE (ML) INJECTION EVERY 12 HOURS SCHEDULED
Status: DISCONTINUED | OUTPATIENT
Start: 2023-11-29 | End: 2023-11-29 | Stop reason: HOSPADM

## 2023-11-29 RX ORDER — IBUPROFEN 600 MG/1
600 TABLET ORAL ONCE AS NEEDED
Status: DISCONTINUED | OUTPATIENT
Start: 2023-11-29 | End: 2023-11-29 | Stop reason: HOSPADM

## 2023-11-29 RX ORDER — DROPERIDOL 2.5 MG/ML
0.62 INJECTION, SOLUTION INTRAMUSCULAR; INTRAVENOUS ONCE AS NEEDED
Status: DISCONTINUED | OUTPATIENT
Start: 2023-11-29 | End: 2023-11-29 | Stop reason: HOSPADM

## 2023-11-29 RX ORDER — HYDROCODONE BITARTRATE AND ACETAMINOPHEN 5; 325 MG/1; MG/1
1 TABLET ORAL ONCE AS NEEDED
Status: DISCONTINUED | OUTPATIENT
Start: 2023-11-29 | End: 2023-11-29 | Stop reason: HOSPADM

## 2023-11-29 RX ORDER — SODIUM CHLORIDE 9 MG/ML
20 INJECTION, SOLUTION INTRAVENOUS ONCE
OUTPATIENT
Start: 2023-12-18

## 2023-11-29 RX ORDER — FLUMAZENIL 0.1 MG/ML
0.2 INJECTION INTRAVENOUS AS NEEDED
Status: DISCONTINUED | OUTPATIENT
Start: 2023-11-29 | End: 2023-11-29 | Stop reason: HOSPADM

## 2023-11-29 RX ORDER — LIDOCAINE HYDROCHLORIDE 20 MG/ML
INJECTION, SOLUTION EPIDURAL; INFILTRATION; INTRACAUDAL; PERINEURAL AS NEEDED
Status: DISCONTINUED | OUTPATIENT
Start: 2023-11-29 | End: 2023-11-29 | Stop reason: SURG

## 2023-11-29 RX ORDER — DIPHENHYDRAMINE HYDROCHLORIDE 50 MG/ML
50 INJECTION INTRAMUSCULAR; INTRAVENOUS AS NEEDED
OUTPATIENT
Start: 2023-12-18

## 2023-11-29 RX ORDER — LABETALOL HYDROCHLORIDE 5 MG/ML
5 INJECTION, SOLUTION INTRAVENOUS
Status: DISCONTINUED | OUTPATIENT
Start: 2023-11-29 | End: 2023-11-29 | Stop reason: HOSPADM

## 2023-11-29 RX ADMIN — PROPOFOL 50 MG: 10 INJECTION, EMULSION INTRAVENOUS at 07:08

## 2023-11-29 RX ADMIN — PROPOFOL 50 MG: 10 INJECTION, EMULSION INTRAVENOUS at 07:22

## 2023-11-29 RX ADMIN — SODIUM CHLORIDE, POTASSIUM CHLORIDE, SODIUM LACTATE AND CALCIUM CHLORIDE 1000 ML: 600; 310; 30; 20 INJECTION, SOLUTION INTRAVENOUS at 06:02

## 2023-11-29 RX ADMIN — PROPOFOL 50 MG: 10 INJECTION, EMULSION INTRAVENOUS at 07:04

## 2023-11-29 RX ADMIN — PROPOFOL 50 MG: 10 INJECTION, EMULSION INTRAVENOUS at 07:11

## 2023-11-29 RX ADMIN — CEFAZOLIN 2000 MG: 2 INJECTION, POWDER, FOR SOLUTION INTRAMUSCULAR; INTRAVENOUS at 07:05

## 2023-11-29 RX ADMIN — LIDOCAINE HYDROCHLORIDE 100 MG: 20 INJECTION, SOLUTION EPIDURAL; INFILTRATION; INTRACAUDAL; PERINEURAL at 07:03

## 2023-11-29 RX ADMIN — PROPOFOL 50 MG: 10 INJECTION, EMULSION INTRAVENOUS at 07:03

## 2023-11-29 NOTE — ANESTHESIA POSTPROCEDURE EVALUATION
Patient: Juliocesar Tellez    Procedure Summary       Date: 11/29/23 Room / Location:  [facility] OR  /  [facility] OR    Anesthesia Start: 0700 Anesthesia Stop: 0736    Procedure: Single Lumen Port-a-cath insertion with flouroscopy (Chin to Nipples) Diagnosis:       Encounter for care related to vascular access port      Rectal adenocarcinoma      (Encounter for care related to vascular access port [Z45.2])      (Rectal adenocarcinoma [C20])    Surgeons: Marquita Ortiz MD Provider: Garett Vogt CRNA    Anesthesia Type: MAC ASA Status: 3            Anesthesia Type: MAC    Vitals  Vitals Value Taken Time   BP     Temp     Pulse 59 11/29/23 0736   Resp     SpO2 97 % 11/29/23 0736   Vitals shown include unfiled device data.        Post Anesthesia Care and Evaluation    Patient location during evaluation: PHASE II  Patient participation: complete - patient participated  Level of consciousness: awake and sleepy but conscious  Pain score: 0  Pain management: adequate    Airway patency: patent  Anesthetic complications: No anesthetic complications    Cardiovascular status: acceptable  Respiratory status: acceptable  Hydration status: acceptable

## 2023-11-29 NOTE — DISCHARGE INSTRUCTIONS
Wound:   - you have skin glue on your incisions. Okay to shower tomorrow.   - Leave skin glue in place, it should slowly fall off over 2 weeks   - No swimming/soaking/bathing x 2 weeks to allow incisions to heal.     Activity:   - Activity as tolerated.  - No driving or operating machinery on narcotic pain medication.     Pain medication:   - Take 1000mg of tylenol every 8 hours for 3 days. After three days, take it prn.    - You have a prescription for a narcotic. It will be Ultram. Take these only as needed after you have taken the tylenol. If you are taking the Ultram, make sure to take a stool softener (colace) with it as it can cause constipation.   - The narcotic may make you nauseated, you will have a prescription for zofran in case of nausea.     Follow up:   - If you have any concerns, call my office at [phone]

## 2023-11-29 NOTE — OP NOTE
Port-A-Cath Placement with Fluoroscopy Operative Report:     Patient: Juliocesar Tellez  MRN: [id number]    YOB: 1932  Age: 89 y.o.  Sex: male  Unit:  [facility] OR Room/Bed: [facility] OR/MAIN OR Location: [facility]      Admitting Physician: JASON ORTIZ    Primary Care Physician: Gerardo Prescott MD             INDICATIONS: This is a 89 y.o. male who presents with rectal adenocarcinoma indication for port placement.     DATE OF OPERATION: 11/29/2023     Surgeon(s) and Role:     * Jason Ortiz MD - Primary    ANESTHESIA: Monitored Anesthesia Care     PREOPERATIVE DIAGNOSIS: Encounter for care related to vascular access port [Z45.2]  Rectal adenocarcinoma [C20]    POSTOPERATIVE DIAGNOSIS: Same    PROCEDURES PERFORMED:  Port-A-Cath placement in the right cephalic vein     PROCEDURE DETAILS:     After patient was placed on the table in a supine position the bilateral chest and neck were prepped with ChloraPrep and draped in the usual fashion.  Preoperative antibiotics were given.  A timeout was performed.    On the right, the skin overlying the deltopectoral groove was infiltrated with 1% lidocaine with epinephrine. An incision was made, and I dissected down to the deltopectoral groove with a combination of cautery and sharp dissection. The cephalic vein was identified. 2-0 silk ties were placed proximally and distally. The distal tie was tied down.  An 11 blade was used to create a venotomy. The yellow pic was used to open the vein and the catheter was inserted. Under live fluoroscopy, the catheter was noted to be in the SVC. The proximal tie was tied down, ensuring not to narrow the lumen of the catheter in the vein. A pocket was created in the subcutaneous tissue for the port.  The catheter was connected to the Port-A-Cath which was buried into the subcutaneous pocket and secured with 0-Vicryl sutures.  The pocket was closed with 3-0 Vicryl and then 4-0 Monocryl.  The port was accessed through the  skin, blood was easily aspirated, and it was flushed with a final heparin flush. Skin glue was placed over the incision.  The patient tolerated procedure well.  There were no complications. No x-ray needed in PACU     Findings: Port-a-cath placed in the right cephalic vein   Estimated Blood Loss:  20 mL   Complications: none apparent            Specimens: None     Disposition: PACU - hemodynamically stable.           Condition: stable    Marquita Ortiz MD  11/27/2023

## 2023-11-29 NOTE — ANESTHESIA PREPROCEDURE EVALUATION
Anesthesia Evaluation     no history of anesthetic complications:   NPO Solid Status: > 8 hours  NPO Liquid Status: > 8 hours           Airway   Mallampati: II  TM distance: >3 FB  Neck ROM: full  No difficulty expected  Dental      Pulmonary    (+) ,sleep apnea  Cardiovascular     (+) hypertension, hyperlipidemia  (-) CAD      Neuro/Psych  (+) headaches  (-) seizures, TIA, CVA  GI/Hepatic/Renal/Endo    (+) GI bleeding , diabetes mellitus type 2 using insulin  (-) liver disease, no renal disease    ROS Comment: Rectal cancer    Musculoskeletal     Abdominal    Substance History      OB/GYN          Other   arthritis,   history of cancer active      Other Comment: glaucoma              Anesthesia Plan    ASA 3     MAC     intravenous induction     Anesthetic plan, risks, benefits, and alternatives have been provided, discussed and informed consent has been obtained with: patient.    CODE STATUS:

## 2023-12-01 ENCOUNTER — HOSPITAL ENCOUNTER (OUTPATIENT)
Dept: CT IMAGING | Facility: HOSPITAL | Age: 88
Discharge: HOME | End: 2023-12-01
Admitting: INTERNAL MEDICINE
Payer: MEDICARE

## 2023-12-01 DIAGNOSIS — C20 RECTAL ADENOCARCINOMA: ICD-10-CM

## 2023-12-01 PROCEDURE — 25510000001 IOPAMIDOL 61 % SOLUTION: Performed by: INTERNAL MEDICINE

## 2023-12-01 PROCEDURE — 74177 CT ABD & PELVIS W/CONTRAST: CPT

## 2023-12-01 PROCEDURE — 71260 CT THORAX DX C+: CPT

## 2023-12-01 RX ADMIN — IOPAMIDOL 100 ML: 612 INJECTION, SOLUTION INTRAVENOUS at 13:08

## 2023-12-04 ENCOUNTER — OFFICE VISIT (OUTPATIENT)
Dept: ONCOLOGY | Facility: CLINIC | Age: 88
End: 2023-12-04
Payer: MEDICARE

## 2023-12-04 ENCOUNTER — INFUSION (OUTPATIENT)
Dept: ONCOLOGY | Facility: HOSPITAL | Age: 88
End: 2023-12-04
Payer: MEDICARE

## 2023-12-04 ENCOUNTER — TELEPHONE (OUTPATIENT)
Dept: GASTROENTEROLOGY | Age: 88
End: 2023-12-04

## 2023-12-04 ENCOUNTER — ANESTHESIA EVENT (OUTPATIENT)
Dept: ENDOSCOPY | Age: 88
End: 2023-12-04
Payer: MEDICARE

## 2023-12-04 VITALS
TEMPERATURE: 98.6 F | WEIGHT: 185 LBS | BODY MASS INDEX: 27.4 KG/M2 | SYSTOLIC BLOOD PRESSURE: 149 MMHG | HEART RATE: 77 BPM | DIASTOLIC BLOOD PRESSURE: 51 MMHG | RESPIRATION RATE: 16 BRPM | OXYGEN SATURATION: 99 % | HEIGHT: 69 IN

## 2023-12-04 VITALS
SYSTOLIC BLOOD PRESSURE: 132 MMHG | OXYGEN SATURATION: 96 % | HEART RATE: 91 BPM | BODY MASS INDEX: 27.4 KG/M2 | TEMPERATURE: 98.3 F | RESPIRATION RATE: 18 BRPM | HEIGHT: 69 IN | DIASTOLIC BLOOD PRESSURE: 72 MMHG | WEIGHT: 185 LBS

## 2023-12-04 DIAGNOSIS — Z45.2 ENCOUNTER FOR CARE RELATED TO VASCULAR ACCESS PORT: ICD-10-CM

## 2023-12-04 DIAGNOSIS — C20 RECTAL ADENOCARCINOMA: Primary | ICD-10-CM

## 2023-12-04 DIAGNOSIS — D50.9 IRON DEFICIENCY ANEMIA, UNSPECIFIED IRON DEFICIENCY ANEMIA TYPE: Primary | ICD-10-CM

## 2023-12-04 PROBLEM — Z78.9 NON-SMOKER: Status: ACTIVE | Noted: 2023-12-04

## 2023-12-04 LAB — GLUCOSE BLDC GLUCOMTR-MCNC: 132 MG/DL (ref 70–130)

## 2023-12-04 PROCEDURE — 25010000002 HEPARIN LOCK FLUSH PER 10 UNITS: Performed by: INTERNAL MEDICINE

## 2023-12-04 PROCEDURE — 82948 REAGENT STRIP/BLOOD GLUCOSE: CPT | Performed by: FAMILY MEDICINE

## 2023-12-04 PROCEDURE — 1126F AMNT PAIN NOTED NONE PRSNT: CPT | Performed by: INTERNAL MEDICINE

## 2023-12-04 PROCEDURE — G2212 PROLONG OUTPT/OFFICE VIS: HCPCS | Performed by: INTERNAL MEDICINE

## 2023-12-04 PROCEDURE — 1159F MED LIST DOCD IN RCRD: CPT | Performed by: INTERNAL MEDICINE

## 2023-12-04 PROCEDURE — 25810000003 SODIUM CHLORIDE 0.9 % SOLUTION: Performed by: INTERNAL MEDICINE

## 2023-12-04 PROCEDURE — 96374 THER/PROPH/DIAG INJ IV PUSH: CPT

## 2023-12-04 PROCEDURE — 25010000002 IRON SUCROSE PER 1 MG: Performed by: INTERNAL MEDICINE

## 2023-12-04 PROCEDURE — 96365 THER/PROPH/DIAG IV INF INIT: CPT

## 2023-12-04 PROCEDURE — 99215 OFFICE O/P EST HI 40 MIN: CPT | Performed by: INTERNAL MEDICINE

## 2023-12-04 RX ORDER — HEPARIN SODIUM (PORCINE) LOCK FLUSH IV SOLN 100 UNIT/ML 100 UNIT/ML
500 SOLUTION INTRAVENOUS AS NEEDED
Status: DISCONTINUED | OUTPATIENT
Start: 2023-12-04 | End: 2023-12-04 | Stop reason: HOSPADM

## 2023-12-04 RX ORDER — DIPHENHYDRAMINE HYDROCHLORIDE 50 MG/ML
50 INJECTION INTRAMUSCULAR; INTRAVENOUS AS NEEDED
Status: DISCONTINUED | OUTPATIENT
Start: 2023-12-04 | End: 2023-12-04 | Stop reason: HOSPADM

## 2023-12-04 RX ORDER — FAMOTIDINE 10 MG/ML
20 INJECTION, SOLUTION INTRAVENOUS AS NEEDED
Status: DISCONTINUED | OUTPATIENT
Start: 2023-12-04 | End: 2023-12-04 | Stop reason: HOSPADM

## 2023-12-04 RX ORDER — SODIUM CHLORIDE 0.9 % (FLUSH) 0.9 %
10 SYRINGE (ML) INJECTION AS NEEDED
Status: CANCELLED | OUTPATIENT
Start: 2023-12-04

## 2023-12-04 RX ORDER — HEPARIN SODIUM (PORCINE) LOCK FLUSH IV SOLN 100 UNIT/ML 100 UNIT/ML
500 SOLUTION INTRAVENOUS AS NEEDED
Status: CANCELLED | OUTPATIENT
Start: 2023-12-04

## 2023-12-04 RX ORDER — SODIUM CHLORIDE 0.9 % (FLUSH) 0.9 %
10 SYRINGE (ML) INJECTION AS NEEDED
Status: DISCONTINUED | OUTPATIENT
Start: 2023-12-04 | End: 2023-12-04 | Stop reason: HOSPADM

## 2023-12-04 RX ORDER — SODIUM CHLORIDE 9 MG/ML
20 INJECTION, SOLUTION INTRAVENOUS ONCE
Status: COMPLETED | OUTPATIENT
Start: 2023-12-04 | End: 2023-12-04

## 2023-12-04 RX ADMIN — SODIUM CHLORIDE 20 ML/HR: 9 INJECTION, SOLUTION INTRAVENOUS at 10:36

## 2023-12-04 RX ADMIN — IRON SUCROSE 200 MG: 20 INJECTION, SOLUTION INTRAVENOUS at 10:36

## 2023-12-04 RX ADMIN — Medication 10 ML: at 11:12

## 2023-12-04 RX ADMIN — HEPARIN SODIUM (PORCINE) LOCK FLUSH IV SOLN 100 UNIT/ML 500 UNITS: 100 SOLUTION at 11:12

## 2023-12-04 NOTE — TELEPHONE ENCOUNTER
Vin's wife called this morning, said they had a missed call from the office. I could not see any notes in the chart, but I did advise that there was a referral from Dr. Richmond's office, Mrs. Streeter said that this could not be correct as they were currently on their way to an appt with Dr. Richmond and they would ask about this referral then.     Thank you.

## 2023-12-04 NOTE — TELEPHONE ENCOUNTER
12/4/23 lm asking patient to call to schedule FLEX SIG EUS - RECTAL ADENCARCINOMA - SINGH - LH    ^^Looks like it was Ruperto trying to call the pt. I will route this to her to call pt back.

## 2023-12-05 ENCOUNTER — HOSPITAL ENCOUNTER (OUTPATIENT)
Dept: RADIATION ONCOLOGY | Facility: HOSPITAL | Age: 88
Setting detail: RADIATION/ONCOLOGY SERIES
End: 2023-12-05
Payer: MEDICARE

## 2023-12-05 ENCOUNTER — ANESTHESIA (OUTPATIENT)
Dept: ENDOSCOPY | Age: 88
End: 2023-12-05
Payer: MEDICARE

## 2023-12-05 ENCOUNTER — HOSPITAL ENCOUNTER (OUTPATIENT)
Age: 88
Setting detail: OUTPATIENT SURGERY
Discharge: HOME | End: 2023-12-05
Attending: INTERNAL MEDICINE | Admitting: INTERNAL MEDICINE
Payer: MEDICARE

## 2023-12-05 VITALS
OXYGEN SATURATION: 97 % | WEIGHT: 185 LBS | DIASTOLIC BLOOD PRESSURE: 68 MMHG | HEART RATE: 73 BPM | BODY MASS INDEX: 26.48 KG/M2 | SYSTOLIC BLOOD PRESSURE: 164 MMHG | RESPIRATION RATE: 18 BRPM | TEMPERATURE: 98.6 F | HEIGHT: 70 IN

## 2023-12-05 LAB
GLUCOSE BLD-MCNC: 171 MG/DL (ref 70–99)
PERFORMED ON: ABNORMAL

## 2023-12-05 PROCEDURE — 3700000000 HC ANESTHESIA ATTENDED CARE: Performed by: INTERNAL MEDICINE

## 2023-12-05 PROCEDURE — 2500000003 HC RX 250 WO HCPCS: Performed by: NURSE ANESTHETIST, CERTIFIED REGISTERED

## 2023-12-05 PROCEDURE — 7100000010 HC PHASE II RECOVERY - FIRST 15 MIN: Performed by: INTERNAL MEDICINE

## 2023-12-05 PROCEDURE — 2709999900 HC NON-CHARGEABLE SUPPLY: Performed by: INTERNAL MEDICINE

## 2023-12-05 PROCEDURE — 3700000001 HC ADD 15 MINUTES (ANESTHESIA): Performed by: INTERNAL MEDICINE

## 2023-12-05 PROCEDURE — 45341 SIGMOIDOSCOPY W/ULTRASOUND: CPT | Performed by: INTERNAL MEDICINE

## 2023-12-05 PROCEDURE — 82962 GLUCOSE BLOOD TEST: CPT

## 2023-12-05 PROCEDURE — 3609018500 HC EGD US SCOPE W/ADJACENT STRUCTURES: Performed by: INTERNAL MEDICINE

## 2023-12-05 PROCEDURE — 6360000002 HC RX W HCPCS: Performed by: NURSE ANESTHETIST, CERTIFIED REGISTERED

## 2023-12-05 PROCEDURE — 7100000011 HC PHASE II RECOVERY - ADDTL 15 MIN: Performed by: INTERNAL MEDICINE

## 2023-12-05 PROCEDURE — 2580000003 HC RX 258: Performed by: INTERNAL MEDICINE

## 2023-12-05 RX ORDER — PROPOFOL 10 MG/ML
INJECTION, EMULSION INTRAVENOUS PRN
Status: DISCONTINUED | OUTPATIENT
Start: 2023-12-05 | End: 2023-12-05 | Stop reason: SDUPTHER

## 2023-12-05 RX ORDER — SODIUM CHLORIDE, SODIUM LACTATE, POTASSIUM CHLORIDE, CALCIUM CHLORIDE 600; 310; 30; 20 MG/100ML; MG/100ML; MG/100ML; MG/100ML
INJECTION, SOLUTION INTRAVENOUS CONTINUOUS
Status: DISCONTINUED | OUTPATIENT
Start: 2023-12-05 | End: 2023-12-05 | Stop reason: HOSPADM

## 2023-12-05 RX ORDER — LIDOCAINE HYDROCHLORIDE 10 MG/ML
INJECTION, SOLUTION INFILTRATION; PERINEURAL PRN
Status: DISCONTINUED | OUTPATIENT
Start: 2023-12-05 | End: 2023-12-05 | Stop reason: SDUPTHER

## 2023-12-05 RX ADMIN — PROPOFOL 180 MG: 10 INJECTION, EMULSION INTRAVENOUS at 11:35

## 2023-12-05 RX ADMIN — LIDOCAINE HYDROCHLORIDE 40 MG: 10 INJECTION, SOLUTION INFILTRATION; PERINEURAL at 11:35

## 2023-12-05 RX ADMIN — SODIUM CHLORIDE, POTASSIUM CHLORIDE, SODIUM LACTATE AND CALCIUM CHLORIDE: 600; 310; 30; 20 INJECTION, SOLUTION INTRAVENOUS at 10:04

## 2023-12-05 ASSESSMENT — PAIN - FUNCTIONAL ASSESSMENT: PAIN_FUNCTIONAL_ASSESSMENT: 0-10

## 2023-12-05 NOTE — ANESTHESIA POSTPROCEDURE EVALUATION
Department of Anesthesiology  Postprocedure Note    Patient: Clementine Talley  MRN: [id number]  YOB: 1932  Date of evaluation: 12/5/2023      Procedure Summary     Date: 12/05/23 Room / Location: [address]    Anesthesia Start: 1128 Anesthesia Stop:     Procedure: FLEX SIG EUS Diagnosis:       Rectal carcinoma ([address])      (Rectal carcinoma ([address]) Harrie Paget)    Surgeons: Courtney Frye MD Responsible Provider: ADELSO Snell CRNA    Anesthesia Type: general, TIVA ASA Status: 3          Anesthesia Type: No value filed.     Brian Phase I: Brian Score: 10    Brian Phase II:        Anesthesia Post Evaluation    Patient location during evaluation: bedside  Patient participation: complete - patient participated  Level of consciousness: sleepy but conscious  Pain score: 0  Airway patency: patent  Nausea & Vomiting: no nausea and no vomiting  Complications: no  Cardiovascular status: hemodynamically stable and blood pressure returned to baseline  Respiratory status: acceptable and nasal cannula  Hydration status: stable  Pain management: adequate

## 2023-12-05 NOTE — PROGRESS NOTES
[facility] PJoySDARYA Barber MD      Nilson Kaye, APRN  __________________________________  [facility]  Department of Radiation Oncology  [address] [place] [id number]  Office:  [phone]  Fax: [phone]    DATE: 12/06/2023  PATIENT: Juliocesar Tellez 12/19/1932                         MEDICAL RECORD #:  [id number]                                                       REASON FOR CONSULTATION:    Chief Complaint   Patient presents with    Rectal Cancer     Juliocesar Tellez is a male that has been referred to our office for radiotherapy considerations for newly diagnosed rectal carcinoma adenocarcinoma.  Patient was seen by Dr. Calderon Cano yesterday for endoscopic ultrasound rectal cancer staging.  He reports ongoing soft/watery bowel movements with mild intermittent blood streaking on stool.  Denies pelvic area pain or pain with bowel movements.  He denies fatigue, dizziness or a sensation of falling.  He denies knowledge of nodules, masses or unexplained new bone pain.  He denies unexplained headaches, nausea, vomiting or new focal neurological deficits.  He denies other complaints.    History of Present Illness:    09/27/2023 - Appointment with :  Presents with bright red blood per rectum. X 1 mo, daily.   Small amount. Stools are loose.   No solid stool in several months.   Lost 4 pounds.   Plan:  Differential diagnoses discussed including but not limited to hemorrhoid, rectal fissure, colon polyp, diverticulosis, and colon cancer.     I recommend emergency room if worsening or severe symptoms.    We discussed colonoscopy along with risk.   Discussed that if colon perforation were to occur he would be high risk for surgery due to his age.   He verbalized understanding and wishes not to proceed.   We discussed anusol supplement treatment and see if this helps.   Office visit 2 weeks to re eval.        10/10/2023 - Appointment with :  Presents for f/u bright  "red blood per rectum.   He did use suppositories did no help.    He has bm 2-3 times per day.  The blood is noted with bm.   The amount is small , streak on bm.    Plan:  Differential diagnoses discussed.    He is interested in pursuing further workup.    He does not want to have full colonoscopy.    Plan for flex sig.   Enema am of procedure.     Request labs from pcp.       11/09/2023 - Colonoscopy with biopsy due to rectal bleeding per :  Findings:  The digital rectal exam revealed a firm rectal mass.  A fungating and infiltrative partially obstructing large mass was found from 4 to 11 cm proximal to the anus. The mass was partially circumferential (involving two-thirds of the lumen circumference). The mass measured eight cm in length. In addition, its diameter measured six mm. Biopsies were taken with a cold forceps for histology.  Impression:  Adequate to evaluate for larger lesions.  Preparation of the colon was fair.  Rectal mass.  Malignant partially obstructing tumor from 4 to 11 cm proximal to the anus.  Biopsied.  The examination was otherwise normal on direct and retroflexion views.  Pathology:  Large intestine, designated \"rectal mass\":  Invasive adenocarcinoma, well to moderately differentiated    11/09/2023 - Documentation per :  On today's colonoscopy exam I encountered a rectal mass consistent with rectal cancer.    He is in agreement to go see oncology.    Can you please make an appointment with Dr. Craft or Dr. Chopra.    The patient lives in [facility] of [facility]. Craft and still goes to [facility] he would prefer that.  Overall, recommend first available appointment     11/20/2023 - Consult with Dr. Abraham:  Assessment:   Rectal adenocarcinoma  --Original tumor stage:  Incomplete staging assessment  --Original tumor burden:  --11/9/23- Colonoscopy:  Rectal mass.  Malignant partially obstructing tumor from 4 to 11 cm proximal to the anus.  Biopsied.  Exam " "otherwise normal on direct and retroflexion views.  Final diagnosis:Large intestine, designated \"rectal mass\".  Invasive adenocarcinoma, well to moderately differentiated.  --Complications of tumor:  --Rectal bleeding, anemia  --Tumor status:  --Untreated  Anemia, NOS. Partially from rectal bleeding  Diabetes on insulin  Arthritis  BPH  SARAHI  Advanced age  Borderline PS (ECOG 1-2)  Plan:  Apprised of (limited) diagnostic information, thus the rationale for further   evaluations as enumerated below.  They agree to the plan so far.  Draw baseline CBC with differential, CMP, iron, iron saturation, ferritin, B12,   folate, CEA  Schedule staging CT scans of the chest, abdomen/pelvis with p.o. and IV   contrast  Refer to Dr. Calderon Cano Re:  Rectal EUS for T + N staging assessment  Refer to radiation oncology Re: Anticipate total neoadjuvant chemotherapy - concurrent RT - surgery  Refer to general surgery Re:  Mediport placement and assessment for surgery  Return to office in 2 weeks for further recommendations    11/29/2023 - Port placement per .    12/01/2023 - CT Chest with contrast:  No definite evidence of metastatic disease in the chest. There is a 7 mm nodular density along the right minor fissure which is likely an intrafissural lymph node or small area of atelectasis/scarring. However, recommend special attention on follow-up imaging to exclude a pulmonary nodule.  Advanced pulmonary fibrosis, UIP pattern.   Mild bilateral hilar lymphadenopathy, likely reactive.    12/01/2023 - CT Abdomen/Pelvis with contrast:  Thickening of the wall of the rectum consistent with the history of rectal neoplasm. The outer wall of the rectum is somewhat ill-defined. Therefore, there may be some extension through the wall. In addition, the fat plane between the rectum and prostate is obscured suggesting possible extension of tumor into this area. There is a 5 mm lymph node in the left perirectal fat image 71 series 3. There " "is an additional small lymph node more superiorly in the pelvic mesenteric fat measuring 6-7 mm in short axis diameter.  No other evidence of metastatic disease in the abdomen or pelvis.  Bilateral inguinal hernias. The hernia on the right contains a portion of the bladder wall and peritoneal fat. The hernia on the left contains peritoneal fat.  There is thickening of the bladder wall. The bladder is under distended. Wall thickening may be due to muscular hypertrophy as there is enlargement of the prostate. Artifact of under distention, infection and inflammation are considered.  Gallstones in the gallbladder.    12/04/2023 - Appointment with :   Rectal adenocarcinoma  --Original tumor stage:  At least TNM III (cT3, cN1, M0)  --Original tumor burden:  --11/9/23- Colonoscopy:  Rectal mass.  Malignant partially obstructing tumor from 4 to 11 cm proximal to the anus.  Biopsied.  Exam otherwise normal on direct and retroflexion views.  Final diagnosis:Large intestine, designated \"rectal mass\".  Invasive adenocarcinoma, well to moderately differentiated.  --12/1/23- CT CAP- No mets in chest.  Thickening of the wall of the rectum consistent with the history of rectal neoplasm. The outer wall of the rectum is somewhat ill-defined. Therefore, there may be some extension through the wall. In addition, the fat plane between the rectum and prostate is obscured suggesting possible extension of tumor into this area. There is a 5 mm lymph node in the left perirectal fat image 71 series 3. There is an additional small lymph node more superiorly in the pelvic mesenteric fat measuring 6-7 mm in short axis diameter. No other evidence of mets.  --Complications of tumor:  --Rectal bleeding, anemia  --Tumor status:  --Untreated  Anemia. Iron deficiency from rectal bleeding/malignancy  --11/20/23- fe 46 (L), fe sat 13% (L), ferritin 24 (L)  Diabetes on insulin  Arthritis  BPH  SARAHI  Advanced age  Borderline PS (ECOG " "2)  Plan:  Apprised of baseline labs with microcytic anemia otherwise normal CBC, essentially normal CMP,  repleted B12/folate, but depressed iron levels and elevated CEA (15)  Review CT scans of the chest, abdomen/pelvis, 12/1/23 (above).  \"There may be some extension through the wall. In addition, the fat plane between the rectum and prostate is obscured suggesting possible extension of tumor into this area. There is a 5 mm lymph node in the left perirectal fat and small lymph node more superiorly in the pelvic mesenteric fat measuring 6-7 mm in short axis diameter...no other mets.\"  Refer to Dr. Calderon Cano Re:  Rectal EUS for T + N staging assessment  Request that pathology send the tumor specimen from, 11/19/23 for MMR/ MSI.  Review NCCN guidelines 2.2023 rectal cancer-for clinical stage T4, N any - pMMR/LORENZO- primary treatment - total neoadjuvant therapy.  FOLFOX or [place] ox- 12-16 weeks)-long course chemo/RT with capecitabine or infusional 1-EA-xmimndtio (best tumor response 8 weeks after completion of RT)- reassess for surgery (Transabdominal resection or of CR consider observation.  If resection contraindciated-systemic therapy; If dMMR/MSI-H- primary treatment- NEOADJUVANT/DEFINITIVE IMMUNOTHERAPY (PREFERRED)- IO therapy for up to 6 months (nivolumab or pembrolizumab); OR Total neoadjuvant therapy (as above)  Provide teaching sheets for 5-FU, and oxaliplatin.  Discuss with patient: FOLFOX (5-FU and oxaliplatin) chemotherapy (to include but not limited to: Myelosuppression (leukopenia, anemia, thrombocytopenia), agranulocytosis, hemorrhage, GI bleed, stomatitis, coronary vasospasm, myocardial ischemia, anaphylaxis, nausea, vomiting, diarrhea, anorexia, hand-foot syndrome, alopecia, dermatitis, photosensitivity, skin ulcers, acute cerebellar syndrome, headache, confusion, ocular irritation, hyperlacrimation, nail discoloration; oxaliplatin: Hypersensitivity reactions, anaphylaxis, laryngospasm, thromboembolism, " "myelosuppression (pancytopenia), hemolytic uremic syndrome, pulmonary fibrosis, interstitial lung disease, hepatotoxicity, pancreatitis, ileus, GI bleed, nausea/vomiting, severe diarrhea, metabolic acidosis, acute renal failure, reversible posterior leukoencephalopathy syndrome, peripheral neuropathy, optic neuritis, deafness, seizures, stomatitis, liver enzyme abnormalities, anorexia, cough, constipation, belly pain, fever, infection, dyspnea, epistaxis, edema, taste changes, myalgias, hyperglycemia, headache, insomnia, dyspepsia, back pain, injection site reaction, weight loss, peripheral edema, pharyngolaryngeal dysesthesia).  Questions answered.  He tentatively agrees to a trial of therapy but is contemplating a comfort care approach.  \"At my age I don't think I can tolerate all that needs to be done with chemo then radiation then surgery.\" They will discuss and let us know.  Will refer to palliative care if he decides to forego therapy.  Schedule treatment - C1, ; C2, ; C3,  at [facility]:  To be administered every 2 weeks x 6 cycles- due to age, comorbidities and PS will dose reduce 20% up front from C1  BSA =    D1:   Oxaliplatin 85 mg/m2 IVPB over 2 hours (TD =  mg).   Precede and follow Oxaliplatin with: Ca Gluconate 2 gm and MgSO4 2 gm in 250 mL D5W IVPB over 30 min.   Leucovorin 400 mg/m2 IVPB over 2 hours (TD= mg).   5- mg/ m2 IVP (TD =  mg).   Begin 5-FU 2400? mg/m2 IVPB over 46 hours. (TD=  mg).  --Premedicate with:   Aloxi 0.25 mg IV   Emend 130 mg IV   Decadron 10 mg IV   Upon initiation of chemo:  CMP and CBC weekly, with Procrit 40,000 if hemoglobin less than 10 and hematocrit less than 30 and Zarxio/Neupogen 480 mcg subcutaneously x5 days if ANC less than 1.0 at [facility]  Rx:   Zofran 8 mg p.o. 3 times daily #30   Refer to radiation oncology Re:  Assess for concurrent RT+5-FU after FOLFOX  Refer to Dr. Gali Kelly - colorectal surgery at [place]   Venofer 200 mg IV daily x 4 as ordered  Return to " office in 4 weeks with preoffice CBC, diff and CMP    12/05/2023 --flexible sigmoidoscopy with rectal EUS, Dr. Calderon Cano  Known rectal mass was 80% circumferential and invasive appearing with ulceration extending from 4 cm to 14 cm  Endosonography revealed an irregular appearing heterogeneous mass measuring over 4.4 cm in greatest dimension with evidence of invasion through and obliteration of the muscularis propria.  Irregular borders were noted with questionable spread through the rectal wall.  The lesion did not appear to invade the prostate.  No definite lymphadenopathy was appreciated.    History obtained from  PATIENT, FAMILY, and CHART    PAST MEDICAL HISTORY  Past Medical History:   Diagnosis Date    Arthritis     BPH (benign prostatic hyperplasia)     Cancer     Colon polyp     Diabetes mellitus     Head ache     Hemorrhoids     Hyperlipidemia     Hypertension     SARAHI (obstructive sleep apnea)     POAG (primary open-angle glaucoma)     Pseudophakia     Rectal bleeding     Rectal cancer     Sleep apnea       PAST SURGICAL HISTORY  Past Surgical History:   Procedure Laterality Date    COLONOSCOPY  12/31/2013    8 polyps, tubular adenoma, a few divertucula    COLONOSCOPY N/A 11/09/2023    Procedure: SIGMOIDOSCOPY FLEXIBLE;  Surgeon: Terry Malik MD;  Location: [facility] ENDOSCOPY;  Service: Gastroenterology;  Laterality: N/A;  preop; BRBPR  postop rectal mass  PCP Gerardo Prescott    TONSILLECTOMY      VENOUS ACCESS DEVICE (PORT) INSERTION N/A 11/29/2023    Procedure: Single Lumen Port-a-cath insertion with flouroscopy;  Surgeon: Marquita Ortiz MD;  Location: [facility] OR;  Service: General;  Laterality: N/A;      PAST ONCOLOGIC HISTORY: Patient denies previous history of malignancies, chemotherapy or radiation therapy.  He denies knowledge of vascular/connective tissue disorders or active rheumatologic disease.    FAMILY HISTORY  family history includes Liver cancer in his sister; No Known Problems in  his father and mother.    SOCIAL HISTORY  Social History     Tobacco Use    Smoking status: Never    Smokeless tobacco: Never   Vaping Use    Vaping Use: Never used   Substance Use Topics    Alcohol use: Never    Drug use: Never     ALLERGIES  Sulfa antibiotics     MEDICATIONS    Current Outpatient Medications:     acetaminophen (Tylenol) 325 MG tablet, Take 3 tablets by mouth Every 8 (Eight) Hours. Take every 8 hours for 3 days then take prn as needed., Disp: , Rfl:     aspirin 81 MG EC tablet, Take 1 tablet by mouth Daily., Disp: , Rfl:     atorvastatin (LIPITOR) 40 MG tablet, Take 1 tablet by mouth Daily., Disp: , Rfl:     azelastine (ASTELIN) 0.1 % nasal spray, 2 sprays into the nostril(s) as directed by provider 2 (Two) Times a Day. Use in each nostril as directed, Disp: , Rfl:     cetirizine (zyrTEC) 10 MG tablet, Take 1 tablet by mouth Daily., Disp: , Rfl:     dicyclomine (BENTYL) 10 MG capsule, Take 1 capsule by mouth 4 (Four) Times a Day Before Meals & at Bedtime., Disp: , Rfl:     insulin lispro protamine-insulin lispro (humaLOG 75-25) (75-25) 100 UNIT/ML suspension injection, Inject  under the skin into the appropriate area as directed Take As Directed. 60 MORNING 35 EVENING, Disp: , Rfl:     ketoconazole (NIZORAL) 2 % cream, Apply 1 application  topically to the appropriate area as directed Daily., Disp: , Rfl:     ketoconazole (NIZORAL) 2 % cream, Apply 1 application  topically to the appropriate area as directed Daily., Disp: , Rfl:     lisinopril (PRINIVIL,ZESTRIL) 2.5 MG tablet, Take 1 tablet by mouth Daily., Disp: , Rfl:     magnesium oxide (MAGOX) 400 (241.3 Mg) MG tablet tablet, Take 1 tablet by mouth Daily., Disp: , Rfl:     Menthol-Zinc Oxide (Calmoseptine) 0.44-20.6 % ointment, Apply 1 application  topically to the appropriate area as directed 2 (Two) Times a Day., Disp: , Rfl:     Olopatadine HCl (PATADAY OP), Apply  to eye(s) as directed by provider., Disp: , Rfl:     ondansetron (Zofran) 4  "MG tablet, Take 1 tablet by mouth Every 8 (Eight) Hours As Needed for Nausea or Vomiting., Disp: 15 tablet, Rfl: 0    tamsulosin (FLOMAX) 0.4 MG capsule 24 hr capsule, TAKE ONE CAPSULE DAILY, Disp: , Rfl:     timolol (BLOCADREN) 5 MG tablet, Take 1 tablet by mouth 2 (Two) Times a Day., Disp: , Rfl:     TRAVOPROST OP, Apply  to eye(s) as directed by provider., Disp: , Rfl:     Current outpatient and discharge medications have been reconciled for the patient.  Reviewed by: Tomi Su MD    The following portions of the patient's history were reviewed and updated as appropriate: allergies, current medications, past family history, past medical history, past social history, past surgical history and problem list.    REVIEW OF SYSTEMS  Review of Systems   HENT:  Positive for hearing loss.    Gastrointestinal:  Positive for blood in stool and diarrhea.   All other systems reviewed and are negative.    PHYSICAL EXAM  VITAL SIGNS:   Vitals:    12/06/23 0935   BP: 157/63   Pulse: 85   SpO2: 97%  Comment: room air   Weight: 83.9 kg (185 lb)   Height: 175.3 cm (69\")   PainSc: 0-No pain       Physical Exam  HENT:      Head: Normocephalic and atraumatic.      Nose: Nose normal.      Mouth/Throat:      Mouth: Mucous membranes are moist.      Pharynx: Oropharynx is clear.   Eyes:      Extraocular Movements: Extraocular movements intact.      Conjunctiva/sclera: Conjunctivae normal.      Pupils: Pupils are equal, round, and reactive to light.   Cardiovascular:      Rate and Rhythm: Normal rate.      Pulses: Normal pulses.   Pulmonary:      Effort: Pulmonary effort is normal.   Abdominal:      General: Abdomen is flat.      Palpations: Abdomen is soft.   Musculoskeletal:         General: Normal range of motion.      Cervical back: Normal range of motion and neck supple.   Skin:     General: Skin is warm and dry.   Neurological:      General: No focal deficit present.      Mental Status: He is oriented to person, place, and " time. Mental status is at baseline.   Psychiatric:         Mood and Affect: Mood normal.         Behavior: Behavior normal.         Thought Content: Thought content normal.         Performance Status: ECOG (0) Fully active, able to carry on all predisease performance without restriction    Clinical Quality Measures  -Pain Documented by Standardized Tool, FPS Juliocesar Tellez reports a pain score of 0.  Given his pain assessment as noted, treatment options were discussed and the following options were decided upon as a follow-up plan to address the patient's pain:  No pain .  Pain Medications               acetaminophen (Tylenol) 325 MG tablet Take 3 tablets by mouth Every 8 (Eight) Hours. Take every 8 hours for 3 days then take prn as needed.    aspirin 81 MG EC tablet Take 1 tablet by mouth Daily.          Body Mass Index Screening and Follow-Up Plan  Body mass index is 27.32 kg/m².     Tobacco Use: Screening and Cessation Intervention  Social History    Tobacco Use      Smoking status: Never      Smokeless tobacco: Never    Advanced Care Planning    Advance Care Planning   Advance Care Planning   ACP discussion was held with the patient during this visit. Patient does not have an advance directive, information provided.     ASSESSMENT AND PLAN  1. Rectal adenocarcinoma    2. Non-smoker      No orders of the defined types were placed in this encounter.    RECOMMENDATIONS: Juliocesar Tellez was newly diagnosed with well to moderately differentiated rectal adenocarcinoma, stage III (cT3 cN1 M0), CEA 15.1 on 11/20/2023.  Known tumor burden includes an approximate 4.4 cm tumor in greatest dimension spanning from 4 to 14 cm and being 80% circumferential.  Diagnosis was made following workup of blood noted on stools and long-term bowel movement pattern changes, mainly diarrhea with multiple small bowel movements.  At this time, patient is not obstructed and he does not have pain related to his tumor.    Given the above, I do not  identify a role for upfront palliative radiotherapy.  I agree with Dr. Craft's recommendations for total neoadjuvant therapy for the treatment of his locally advanced rectal adenocarcinoma consisting of neoadjuvant chemotherapy followed by neoadjuvant chemoradiotherapy and subsequent surgery.    Indications and rationale of neoadjuvant radiation therapy delivered concurrent with chemotherapy to the colorectal region according to NCCN Guidelines were discussed today, advocating for total neoadjuvant therapy or chemoradiation plus surgery.  Although patient is 89 years old, he appears to be in good physical shape and good performance status.  I believe he is a good candidate for total neoadjuvant therapy.  I have extensively reviewed the risks, benefits and alternatives of therapy as well as possible progression of disease in spite of therapy with either local or systemic failure.  Side effects include but are not limited to sunburn-type skin irritation of the targeted area (which may range from mild to  Intense, red, dry, tender, or itchy skin, general fatigue, diarrhea, rectal bleeding, hemorrhoids, vaginal itching, burning, and dryness for women, Incontinence of bowel or bladder, bladder irritation and sexual problems.    I have seen, examined and reviewed this patient's medication list, appropriate labs and imaging studies as well as other physician notes. We discussed the goals and plans of care and answered all questions.     In consideration of the diagnotic data and evaluation of the patient, I agree with total neoadjuvant therapy with chemo planned to be delivered under Dr. Craft's care, subsequent concurrent chemo-radiation to the pelvis, I anticipate a dose of 5040 cGy over 28 fractions with referral to surgeon post completion of therapy.     The patient and family verbalize understanding of this discussion, voice no further questions and wish to proceed with recommendations.  We will inform   [facility]'s office of patient's decision to move forward with treatment so that chemotherapy can be scheduled.  Patient will be referred back to us when he is ready to start his chemoradiotherapy course of treatment.   Continue ongoing management per primary care physician and other specialists.    Thank you for allowing me to assist in this patients care.    There are no Patient Instructions on file for this visit.    Time Spent: I spent 56 minutes caring for Juliocesar on this date of service. This time includes time spent by me in the following activities: preparing for the visit, reviewing tests, obtaining and/or reviewing a separately obtained history, performing a medically appropriate examination and/or evaluation, counseling and educating the patient/family/caregiver, ordering medications, tests, or procedures, referring and communicating with other health care professionals, and documenting information in the medical record.   Tomi Su MD  12/06/2023

## 2023-12-05 NOTE — OP NOTE
patient, and the procedure was terminated. Findings are listed below. Findings: There is a noted mass c/w known rectal malignancy. This lesion was 80% circumferential. It was invasive appearing with ulceration and extending from 4cm to 14cm. Rectal EUS:  - Under endosongraphy, the lesion was noted. There was an irregular appearing heterogenous mass measuring over 4.4cm in greatest dimension. There was evidence of invasion through and obliteration of the muscularis propria. There was irregular borders with questionable spread through the rectal wall as well. The lesion did not appear to invade the prostate. I did not appreciate any definite lymphadenopathy on this exam.     Impression:  - Rectal adenocarcinoma c/w at least T3 disease. There are 2 very small lymph nodes noted on outside CT scan. Recommendations:  1. Close follow-up with Medical and Radiation Oncology to start chemo-radiation therapy. Given location and size of tumor, patient will need intervention of some type to help reduce risk of obstruction. I discussed with patient and wife that this may include palliative chemo-radiation. I think discussion with surgical oncology (I discussed that Dr Robin Thorne will see patients locally) would be worthwhile as well if patient/family would like surgical discussion or even options for palliative ostomy if obstruction develops. 2. Please call with questions/concerns. Findings and recommendations were discussed w/ the patient. A copy of the images was provided.     Ricardo Jade MD  12/5/2023  12:01 PM

## 2023-12-05 NOTE — PROGRESS NOTES
Pt states he gave himself Fleets enemas this am at 0600 and is coming out yellow clear. He did experience some rectal bleeding with the enema.

## 2023-12-05 NOTE — H&P
Patient Name: Zee Ross  : 1932  MRN: [id number]  DATE: 23    Allergies: Allergies   Allergen Reactions    Sulfa Antibiotics Nausea And Vomiting        ENDOSCOPY  History and Physical    Procedure:    [] Flexible Sigmoidoscopy with EUS    [x] Previous office notes/History and Physical reviewed from the patients chart. Please see EMR for further details of HPI. I have examined the patient's status immediately prior to the procedure and:      Indications/HPI:    [x]Staging of rectal cancer    Anesthesia:   [x] MAC [] Moderate Sedation   [] General   [] None     ROS: 12 pt Review of Symptoms was negative unless mentioned above    Medications:   Prior to Admission medications    Medication Sig Start Date End Date Taking? Authorizing Provider   aspirin 81 MG EC tablet Take 1 tablet by mouth daily    Dell Locke MD   azelastine (ASTELIN) 0.1 % nasal spray 1 spray by Nasal route 2 times daily Use in each nostril as directed  Patient not taking: Reported on 2023    Dell Locke MD   cetirizine (ZYRTEC) 10 MG tablet Take 1 tablet by mouth daily    Dell Locke MD   dicyclomine (BENTYL) 10 MG capsule Take 1 capsule by mouth 4 times daily (before meals and nightly)    Dell Locke MD   glucagon, rDNA, 1 MG injection once  Patient not taking: Reported on 2023    Dell Locke MD   hydrocortisone (ANUSOL-HC) 25 MG suppository Place 1 suppository rectally 2 times daily  Patient not taking: Reported on 2023    Dell Locke MD   ketoconazole (NIZORAL) 2 % cream Apply topically daily Apply topically daily.     Dell Locke MD   lisinopril (PRINIVIL;ZESTRIL) 2.5 MG tablet Take 1 tablet by mouth daily    Dell Locke MD   magnesium oxide (MAG-OX) 400 (240 Mg) MG tablet Take 1 tablet by mouth daily    Dell Locke MD   menthol-zinc oxide (CALMOSEPTINE) 0.44-20.625 % OINT ointment Apply topically daily Max 30 ml per

## 2023-12-05 NOTE — DISCHARGE INSTRUCTIONS
Impression:  - Rectal adenocarcinoma c/w at least T3 disease. There are 2 very small lymph nodes noted on outside CT scan. Recommendations:  1. Close follow-up with Medical and Radiation Oncology to start chemo-radiation therapy. Given location and size of tumor, patient will need intervention of some type to help reduce risk of obstruction. I discussed with patient and wife that this may include palliative chemo-radiation. I think discussion with surgical oncology (I discussed that Dr Fabio Quijano will see patients locally) would be worthwhile as well if patient/family would like surgical discussion or even options for palliative ostomy if obstruction develops. 2. Please call with questions/concerns. Colonoscopy: What to Expect at [address]    After the test, you may be bloated or have gas pains. You may need to pass gas. If a biopsy was done or a polyp was removed, you may have streaks of blood in your stool (feces) for a few days. Problems such as heavy rectal bleeding may not occur until several weeks after the test. This isn't common. But it can happen after polyps are removed. This care sheet gives you a general idea about how long it will take for you to recover. But each person recovers at a different pace. Follow the steps below to get better as quickly as possible. How can you care for yourself at home? Activity    Rest when you feel tired. You can do your normal activities when it feels okay to do so. Diet    You may resume your regular diet. Drink plenty of fluids. This helps to replace the fluids that were lost during the colon prep. Do not drink alcohol. Medicines    Your doctor will tell you if and when you can restart your medicines. You will also be given instructions about taking any new medicines. If you stopped taking aspirin or some other blood thinner, your doctor will tell you when to start taking it again.      If polyps were removed or a biopsy was

## 2023-12-06 ENCOUNTER — CONSULT (OUTPATIENT)
Dept: RADIATION ONCOLOGY | Facility: HOSPITAL | Age: 88
End: 2023-12-06
Payer: MEDICARE

## 2023-12-06 ENCOUNTER — DOCUMENTATION (OUTPATIENT)
Dept: RADIATION ONCOLOGY | Facility: HOSPITAL | Age: 88
End: 2023-12-06
Payer: MEDICARE

## 2023-12-06 ENCOUNTER — CLINICAL SUPPORT (OUTPATIENT)
Dept: ONCOLOGY | Facility: CLINIC | Age: 88
End: 2023-12-06
Payer: MEDICARE

## 2023-12-06 VITALS
HEART RATE: 85 BPM | SYSTOLIC BLOOD PRESSURE: 157 MMHG | BODY MASS INDEX: 27.4 KG/M2 | WEIGHT: 185 LBS | OXYGEN SATURATION: 97 % | DIASTOLIC BLOOD PRESSURE: 63 MMHG | HEIGHT: 69 IN

## 2023-12-06 DIAGNOSIS — C20 RECTAL ADENOCARCINOMA: Primary | ICD-10-CM

## 2023-12-06 DIAGNOSIS — Z78.9 NON-SMOKER: ICD-10-CM

## 2023-12-06 PROCEDURE — G0463 HOSPITAL OUTPT CLINIC VISIT: HCPCS | Performed by: RADIOLOGY

## 2023-12-06 RX ORDER — LIDOCAINE AND PRILOCAINE 25; 25 MG/G; MG/G
CREAM TOPICAL
Qty: 1 EACH | Refills: 2 | Status: SHIPPED | OUTPATIENT
Start: 2023-12-06

## 2023-12-06 RX ORDER — ONDANSETRON HYDROCHLORIDE 8 MG/1
8 TABLET, FILM COATED ORAL EVERY 8 HOURS PRN
Qty: 30 TABLET | Refills: 2 | Status: SHIPPED | OUTPATIENT
Start: 2023-12-06

## 2023-12-06 RX ORDER — PROCHLORPERAZINE MALEATE 10 MG
10 TABLET ORAL EVERY 6 HOURS PRN
Qty: 60 TABLET | Refills: 1 | Status: SHIPPED | OUTPATIENT
Start: 2023-12-06

## 2023-12-06 NOTE — PROGRESS NOTES
SANTHOSH met with Mr. Tellez who is here for a radiation consultation and chemo education for rectal adenocarcinoma. SANTHOSH introduced self and explained role and source of support. He is 89 years old and lives with his wife. They have been  for 66 years. His support system includes his wife, nephew, niece, friends, and neighbors. Mr. Tellez states his wife can drive but when he does radiation daily it may be a challenge for her to drive.  and Mrs. Tellez feel they have enough support from neighbors to be able to make it to treatment. He is independent with his ADLs. His previous occupation was a .     He is feeling nervous with treatment and the possible side effects. Mr. Tellez states he is still trying to process his diagnosis and the information given to him, from the doctors. Emotional support provided and encouraged him to express his feelings. SANTHOSH did speak to him regarding peer to peer and support groups. He does not take any medication for anxiety/depression, and he does not see a counselor. His coping strategies include reading and outdoor activities. SANTHOSH will follow up with Mr. Tellez once he starts treatment.

## 2023-12-06 NOTE — PROGRESS NOTES
Subjective     PATIENT NAME:  Juliocesar Tellez  YOB: 1932  PATIENTS AGE:  89 y.o.  PATIENTS SEX:  male  DATE OF SERVICE:  12/06/2023  PROVIDER:  YANNI ON[facility] PAD NURSE      ____________________PATIENT EDUCATION____________________    PATIENT EDUCATION:  Today I met with the patient Juliocesar Tellez and his wife, to discuss the chemotherapy regimen (Folfox) Oxaliplatin, Leucovorin, 5-FU, recommended for treatment of his disease Rectal Cancer.  The patient was given explanation of treatment premed side effects including office policy that prohibits patients to drive if sedating medications are administered, MD explanation given regarding benefits, side effects, toxicities and goals of treatment.  The patient received a Chemotherapy/Biotherapy Plan Summary including diagnosis and specific treatment plan.    SIDE EFFECTS:  Common side effects were discussed with the patient and/or significant other.  Discussion included hair loss/discoloration, anemia/fatigue, infection/chills/fever, appetite, bleeding risk/precautions, constipation, diarrhea, mouth sores, taste alteration, loss of appetite,nausea/vomiting, peripheral neuropathy, skin/nail changes, rash, muscle aches/weakness, photosensitivity, weight gain/loss, hearing loss, dizziness,sterility, high blood pressure, heart damage, liver damage, lung damage, kidney damage, DVT/PE risk, fluid retention, pleural/pericardial effusion, somnolence, electrolyte/LFT imbalance, vein exercises and/or the possible need for vascular access/port placement.  The patient was advice that although uncommon, leakage of an infused medication from the vein or venous access device (port) may lead to skin breakdown and/or other tissue damage.  The patient was advised that he/she may have pain, bleeding, and/or bruising from the insertion of a needle in their vein or venous access device (port).  The patient was further advised that, in spite of proper technique, infection with redness  and irritation may rarely occur at the site where the needle was inserted.  The patient was advised that if complications occur, additional medical treatment is available.    Discussion also included side effects specific to drugs in the treatment plan, specifically: Oxalipatlin (cold intolerance) diarrhea, neuropathy, headaches, weakness, fatigue, dizziness, weakness & fatigue, n/v, constipation    Protection during sexual relations.    A total of  90  minutes were spent with the patient, with 100% of time spent in education and counseling.

## 2023-12-08 ENCOUNTER — TELEPHONE (OUTPATIENT)
Dept: ONCOLOGY | Facility: CLINIC | Age: 88
End: 2023-12-08
Payer: MEDICARE

## 2023-12-08 DIAGNOSIS — C20 RECTAL ADENOCARCINOMA: Primary | ICD-10-CM

## 2023-12-08 NOTE — TELEPHONE ENCOUNTER
Called Ronel back and she asked if it was okay for Juliocesar to eat and drink before tx. I let them know that it is fine to eat and drink as normal. Okay to take medications as prescribed as well.

## 2023-12-08 NOTE — TELEPHONE ENCOUNTER
Caller: debbie capone    Relationship: Emergency Contact    Best call back number: [phone]    What is the best time to reach you: ANYTIME    Who are you requesting to speak with (clinical staff, provider,  specific staff member): CLINICAL    What was the call regarding: REQUESTING A CALL BACK TO GO OVER SOME QUESTIONS BEFORE JESSIE'S INFUSION ON 12-12

## 2023-12-08 NOTE — TELEPHONE ENCOUNTER
Spoke with patient's wife Ronel and gave her the appointment for the first chemo scheduled on 12/12/2023 at 8 am and also the appointment with the colorectal surgeon scheduled for 01/03/24 at 2 pm at [facility].

## 2023-12-11 ENCOUNTER — INFUSION (OUTPATIENT)
Dept: ONCOLOGY | Facility: HOSPITAL | Age: 88
End: 2023-12-11
Payer: MEDICARE

## 2023-12-11 VITALS
RESPIRATION RATE: 16 BRPM | SYSTOLIC BLOOD PRESSURE: 147 MMHG | BODY MASS INDEX: 26.37 KG/M2 | HEART RATE: 85 BPM | DIASTOLIC BLOOD PRESSURE: 54 MMHG | OXYGEN SATURATION: 98 % | WEIGHT: 184.2 LBS | TEMPERATURE: 98.3 F | HEIGHT: 70 IN

## 2023-12-11 DIAGNOSIS — D50.9 IRON DEFICIENCY ANEMIA, UNSPECIFIED IRON DEFICIENCY ANEMIA TYPE: Primary | ICD-10-CM

## 2023-12-11 DIAGNOSIS — Z45.2 ENCOUNTER FOR CARE RELATED TO VASCULAR ACCESS PORT: ICD-10-CM

## 2023-12-11 LAB — GLUCOSE BLDC GLUCOMTR-MCNC: 212 MG/DL (ref 70–130)

## 2023-12-11 PROCEDURE — 82948 REAGENT STRIP/BLOOD GLUCOSE: CPT | Performed by: FAMILY MEDICINE

## 2023-12-11 PROCEDURE — 25010000002 IRON SUCROSE PER 1 MG: Performed by: INTERNAL MEDICINE

## 2023-12-11 PROCEDURE — 96365 THER/PROPH/DIAG IV INF INIT: CPT

## 2023-12-11 PROCEDURE — 25010000002 HEPARIN LOCK FLUSH PER 10 UNITS: Performed by: INTERNAL MEDICINE

## 2023-12-11 PROCEDURE — 25810000003 SODIUM CHLORIDE 0.9 % SOLUTION: Performed by: INTERNAL MEDICINE

## 2023-12-11 PROCEDURE — 96374 THER/PROPH/DIAG INJ IV PUSH: CPT

## 2023-12-11 RX ORDER — DIPHENHYDRAMINE HYDROCHLORIDE 50 MG/ML
50 INJECTION INTRAMUSCULAR; INTRAVENOUS AS NEEDED
Status: DISCONTINUED | OUTPATIENT
Start: 2023-12-11 | End: 2023-12-11 | Stop reason: HOSPADM

## 2023-12-11 RX ORDER — SODIUM CHLORIDE 9 MG/ML
20 INJECTION, SOLUTION INTRAVENOUS ONCE
Status: COMPLETED | OUTPATIENT
Start: 2023-12-11 | End: 2023-12-11

## 2023-12-11 RX ORDER — HEPARIN SODIUM (PORCINE) LOCK FLUSH IV SOLN 100 UNIT/ML 100 UNIT/ML
500 SOLUTION INTRAVENOUS AS NEEDED
Status: CANCELLED | OUTPATIENT
Start: 2023-12-11

## 2023-12-11 RX ORDER — SODIUM CHLORIDE 0.9 % (FLUSH) 0.9 %
10 SYRINGE (ML) INJECTION AS NEEDED
Status: CANCELLED | OUTPATIENT
Start: 2023-12-11

## 2023-12-11 RX ORDER — HEPARIN SODIUM (PORCINE) LOCK FLUSH IV SOLN 100 UNIT/ML 100 UNIT/ML
500 SOLUTION INTRAVENOUS AS NEEDED
Status: DISCONTINUED | OUTPATIENT
Start: 2023-12-11 | End: 2023-12-11 | Stop reason: HOSPADM

## 2023-12-11 RX ORDER — SODIUM CHLORIDE 0.9 % (FLUSH) 0.9 %
10 SYRINGE (ML) INJECTION AS NEEDED
Status: DISCONTINUED | OUTPATIENT
Start: 2023-12-11 | End: 2023-12-11 | Stop reason: HOSPADM

## 2023-12-11 RX ORDER — FAMOTIDINE 10 MG/ML
20 INJECTION, SOLUTION INTRAVENOUS AS NEEDED
Status: DISCONTINUED | OUTPATIENT
Start: 2023-12-11 | End: 2023-12-11 | Stop reason: HOSPADM

## 2023-12-11 RX ADMIN — SODIUM CHLORIDE 20 ML/HR: 9 INJECTION, SOLUTION INTRAVENOUS at 09:45

## 2023-12-11 RX ADMIN — HEPARIN SODIUM (PORCINE) LOCK FLUSH IV SOLN 100 UNIT/ML 500 UNITS: 100 SOLUTION at 10:31

## 2023-12-11 RX ADMIN — IRON SUCROSE 200 MG: 20 INJECTION, SOLUTION INTRAVENOUS at 09:49

## 2023-12-11 RX ADMIN — Medication 10 ML: at 10:31

## 2023-12-12 ENCOUNTER — TELEPHONE (OUTPATIENT)
Dept: ONCOLOGY | Facility: CLINIC | Age: 88
End: 2023-12-12
Payer: MEDICARE

## 2023-12-12 ENCOUNTER — LAB (OUTPATIENT)
Dept: LAB | Facility: HOSPITAL | Age: 88
End: 2023-12-12
Payer: MEDICARE

## 2023-12-12 ENCOUNTER — INFUSION (OUTPATIENT)
Dept: ONCOLOGY | Facility: HOSPITAL | Age: 88
End: 2023-12-12
Payer: MEDICARE

## 2023-12-12 ENCOUNTER — DOCUMENTATION (OUTPATIENT)
Dept: RADIATION ONCOLOGY | Facility: HOSPITAL | Age: 88
End: 2023-12-12
Payer: MEDICARE

## 2023-12-12 DIAGNOSIS — C20 RECTAL ADENOCARCINOMA: Primary | ICD-10-CM

## 2023-12-12 DIAGNOSIS — C20 RECTAL ADENOCARCINOMA: ICD-10-CM

## 2023-12-12 LAB
ALBUMIN SERPL-MCNC: 3.6 G/DL (ref 3.5–5.2)
ALBUMIN/GLOB SERPL: 1.1 G/DL
ALP SERPL-CCNC: 99 U/L (ref 39–117)
ALT SERPL W P-5'-P-CCNC: 15 U/L (ref 1–41)
ANION GAP SERPL CALCULATED.3IONS-SCNC: 10 MMOL/L (ref 5–15)
AST SERPL-CCNC: 19 U/L (ref 1–40)
BASOPHILS # BLD AUTO: 0.03 10*3/MM3 (ref 0–0.2)
BASOPHILS NFR BLD AUTO: 0.3 % (ref 0–1.5)
BILIRUB SERPL-MCNC: 0.4 MG/DL (ref 0–1.2)
BUN SERPL-MCNC: 14 MG/DL (ref 8–23)
BUN/CREAT SERPL: 17.5 (ref 7–25)
CALCIUM SPEC-SCNC: 8.4 MG/DL (ref 8.2–9.6)
CHLORIDE SERPL-SCNC: 103 MMOL/L (ref 98–107)
CO2 SERPL-SCNC: 31 MMOL/L (ref 22–29)
CREAT SERPL-MCNC: 0.8 MG/DL (ref 0.76–1.27)
DEPRECATED RDW RBC AUTO: 48.8 FL (ref 37–54)
EGFRCR SERPLBLD CKD-EPI 2021: 84.1 ML/MIN/1.73
EOSINOPHIL # BLD AUTO: 0.15 10*3/MM3 (ref 0–0.4)
EOSINOPHIL NFR BLD AUTO: 1.7 % (ref 0.3–6.2)
ERYTHROCYTE [DISTWIDTH] IN BLOOD BY AUTOMATED COUNT: 14.4 % (ref 12.3–15.4)
GLOBULIN UR ELPH-MCNC: 3.4 GM/DL
GLUCOSE BLDC GLUCOMTR-MCNC: 195 MG/DL (ref 70–130)
GLUCOSE SERPL-MCNC: 280 MG/DL (ref 65–99)
HCT VFR BLD AUTO: 37.3 % (ref 37.5–51)
HGB BLD-MCNC: 11.4 G/DL (ref 13–17.7)
IMM GRANULOCYTES # BLD AUTO: 0.04 10*3/MM3 (ref 0–0.05)
IMM GRANULOCYTES NFR BLD AUTO: 0.5 % (ref 0–0.5)
LYMPHOCYTES # BLD AUTO: 2.58 10*3/MM3 (ref 0.7–3.1)
LYMPHOCYTES NFR BLD AUTO: 30 % (ref 19.6–45.3)
MCH RBC QN AUTO: 29 PG (ref 26.6–33)
MCHC RBC AUTO-ENTMCNC: 30.6 G/DL (ref 31.5–35.7)
MCV RBC AUTO: 94.9 FL (ref 79–97)
MONOCYTES # BLD AUTO: 0.6 10*3/MM3 (ref 0.1–0.9)
MONOCYTES NFR BLD AUTO: 7 % (ref 5–12)
NEUTROPHILS NFR BLD AUTO: 5.21 10*3/MM3 (ref 1.7–7)
NEUTROPHILS NFR BLD AUTO: 60.5 % (ref 42.7–76)
NRBC BLD AUTO-RTO: 0 /100 WBC (ref 0–0.2)
PLATELET # BLD AUTO: 170 10*3/MM3 (ref 140–450)
PMV BLD AUTO: 9.4 FL (ref 6–12)
POTASSIUM SERPL-SCNC: 4.1 MMOL/L (ref 3.5–5.2)
PROT SERPL-MCNC: 7 G/DL (ref 6–8.5)
RBC # BLD AUTO: 3.93 10*6/MM3 (ref 4.14–5.8)
SODIUM SERPL-SCNC: 144 MMOL/L (ref 136–145)
WBC NRBC COR # BLD AUTO: 8.61 10*3/MM3 (ref 3.4–10.8)

## 2023-12-12 PROCEDURE — 25010000002 DEXAMETHASONE SODIUM PHOSPHATE 100 MG/10ML SOLUTION: Performed by: INTERNAL MEDICINE

## 2023-12-12 PROCEDURE — 25010000002 FLUOROURACIL PER 500 MG: Performed by: INTERNAL MEDICINE

## 2023-12-12 PROCEDURE — 96375 TX/PRO/DX INJ NEW DRUG ADDON: CPT

## 2023-12-12 PROCEDURE — 82948 REAGENT STRIP/BLOOD GLUCOSE: CPT | Performed by: FAMILY MEDICINE

## 2023-12-12 PROCEDURE — 96413 CHEMO IV INFUSION 1 HR: CPT

## 2023-12-12 PROCEDURE — 96411 CHEMO IV PUSH ADDL DRUG: CPT

## 2023-12-12 PROCEDURE — 0 DEXTROSE 5 % SOLUTION 250 ML FLEX CONT: Performed by: INTERNAL MEDICINE

## 2023-12-12 PROCEDURE — 25010000002 PALONOSETRON PER 25 MCG: Performed by: INTERNAL MEDICINE

## 2023-12-12 PROCEDURE — G0498 CHEMO EXTEND IV INFUS W/PUMP: HCPCS

## 2023-12-12 PROCEDURE — 25010000002 FOSAPREPITANT PER 1 MG: Performed by: INTERNAL MEDICINE

## 2023-12-12 PROCEDURE — 96367 TX/PROPH/DG ADDL SEQ IV INF: CPT

## 2023-12-12 PROCEDURE — 80053 COMPREHEN METABOLIC PANEL: CPT

## 2023-12-12 PROCEDURE — 25010000002 OXALIPLATIN PER 0.5 MG: Performed by: INTERNAL MEDICINE

## 2023-12-12 PROCEDURE — 36415 COLL VENOUS BLD VENIPUNCTURE: CPT

## 2023-12-12 PROCEDURE — 85025 COMPLETE CBC W/AUTO DIFF WBC: CPT

## 2023-12-12 PROCEDURE — 96415 CHEMO IV INFUSION ADDL HR: CPT

## 2023-12-12 PROCEDURE — 25010000002 LEUCOVORIN CALCIUM PER 50 MG: Performed by: INTERNAL MEDICINE

## 2023-12-12 PROCEDURE — 96368 THER/DIAG CONCURRENT INF: CPT

## 2023-12-12 PROCEDURE — 0 DEXTROSE 5 % SOLUTION: Performed by: INTERNAL MEDICINE

## 2023-12-12 RX ORDER — PALONOSETRON 0.05 MG/ML
0.25 INJECTION, SOLUTION INTRAVENOUS ONCE
Status: COMPLETED | OUTPATIENT
Start: 2023-12-12 | End: 2023-12-12

## 2023-12-12 RX ORDER — FLUOROURACIL 50 MG/ML
320 INJECTION, SOLUTION INTRAVENOUS ONCE
Status: COMPLETED | OUTPATIENT
Start: 2023-12-12 | End: 2023-12-12

## 2023-12-12 RX ORDER — FAMOTIDINE 10 MG/ML
20 INJECTION, SOLUTION INTRAVENOUS AS NEEDED
Status: CANCELLED | OUTPATIENT
Start: 2023-12-12

## 2023-12-12 RX ORDER — DEXTROSE MONOHYDRATE 50 MG/ML
20 INJECTION, SOLUTION INTRAVENOUS ONCE
Status: CANCELLED | OUTPATIENT
Start: 2023-12-12

## 2023-12-12 RX ORDER — FLUOROURACIL 50 MG/ML
320 INJECTION, SOLUTION INTRAVENOUS ONCE
Status: CANCELLED | OUTPATIENT
Start: 2023-12-12

## 2023-12-12 RX ORDER — PALONOSETRON 0.05 MG/ML
0.25 INJECTION, SOLUTION INTRAVENOUS ONCE
Status: CANCELLED | OUTPATIENT
Start: 2023-12-12

## 2023-12-12 RX ORDER — DIPHENHYDRAMINE HYDROCHLORIDE 50 MG/ML
50 INJECTION INTRAMUSCULAR; INTRAVENOUS AS NEEDED
Status: CANCELLED | OUTPATIENT
Start: 2023-12-12

## 2023-12-12 RX ORDER — FLUOROURACIL 50 MG/ML
320 INJECTION, SOLUTION INTRAVENOUS ONCE
Status: CANCELLED | OUTPATIENT
Start: 2023-12-26

## 2023-12-12 RX ORDER — DIPHENHYDRAMINE HYDROCHLORIDE 50 MG/ML
50 INJECTION INTRAMUSCULAR; INTRAVENOUS AS NEEDED
Status: DISCONTINUED | OUTPATIENT
Start: 2023-12-12 | End: 2023-12-12 | Stop reason: HOSPADM

## 2023-12-12 RX ORDER — DEXTROSE MONOHYDRATE 50 MG/ML
20 INJECTION, SOLUTION INTRAVENOUS ONCE
Status: CANCELLED | OUTPATIENT
Start: 2023-12-26

## 2023-12-12 RX ORDER — FAMOTIDINE 10 MG/ML
20 INJECTION, SOLUTION INTRAVENOUS AS NEEDED
Status: CANCELLED | OUTPATIENT
Start: 2023-12-26

## 2023-12-12 RX ORDER — DIPHENHYDRAMINE HYDROCHLORIDE 50 MG/ML
50 INJECTION INTRAMUSCULAR; INTRAVENOUS AS NEEDED
Status: CANCELLED | OUTPATIENT
Start: 2023-12-26

## 2023-12-12 RX ORDER — DEXTROSE MONOHYDRATE 50 MG/ML
20 INJECTION, SOLUTION INTRAVENOUS ONCE
Status: COMPLETED | OUTPATIENT
Start: 2023-12-12 | End: 2023-12-12

## 2023-12-12 RX ORDER — PALONOSETRON 0.05 MG/ML
0.25 INJECTION, SOLUTION INTRAVENOUS ONCE
Status: CANCELLED | OUTPATIENT
Start: 2023-12-26

## 2023-12-12 RX ORDER — FAMOTIDINE 10 MG/ML
20 INJECTION, SOLUTION INTRAVENOUS AS NEEDED
Status: DISCONTINUED | OUTPATIENT
Start: 2023-12-12 | End: 2023-12-12 | Stop reason: HOSPADM

## 2023-12-12 RX ADMIN — OXALIPLATIN 135 MG: 5 INJECTION, SOLUTION, CONCENTRATE INTRAVENOUS at 10:15

## 2023-12-12 RX ADMIN — FLUOROURACIL 640 MG: 50 INJECTION, SOLUTION INTRAVENOUS at 12:31

## 2023-12-12 RX ADMIN — PALONOSETRON HYDROCHLORIDE 0.25 MG: 0.25 INJECTION INTRAVENOUS at 09:14

## 2023-12-12 RX ADMIN — FLUOROURACIL 3840 MG: 50 INJECTION, SOLUTION INTRAVENOUS at 12:37

## 2023-12-12 RX ADMIN — FOSAPREPITANT 150 MG: 150 INJECTION, POWDER, LYOPHILIZED, FOR SOLUTION INTRAVENOUS at 09:35

## 2023-12-12 RX ADMIN — DEXAMETHASONE SODIUM PHOSPHATE 12 MG: 10 INJECTION, SOLUTION INTRAMUSCULAR; INTRAVENOUS at 09:18

## 2023-12-12 RX ADMIN — LEUCOVORIN CALCIUM 640 MG: 350 INJECTION, POWDER, LYOPHILIZED, FOR SOLUTION INTRAMUSCULAR; INTRAVENOUS at 10:15

## 2023-12-12 RX ADMIN — DEXTROSE MONOHYDRATE 20 ML/HR: 50 INJECTION, SOLUTION INTRAVENOUS at 10:15

## 2023-12-12 NOTE — PROGRESS NOTES
SANTHOSH met with  and Mrs. Tellez. Mr. Tellez is starting chemo treatments today for rectal adenocarcinoma. He is feeling nervous about possible side effects. He believes he will feel better after this first treatment and will understand the process better. His wife states she has difficulty remembering what questions she would like to ask the physician. SANTHOSH encouraged her to write down any questions or information they needed clarifications on. SANTHOSH will remain available.

## 2023-12-12 NOTE — PROGRESS NOTES
0901 Contacted Sabra with MD office r/t blood glucose 280, new start folfox. Per Dr ISRAEL dick to treat and then send pt to PCP. Pt  and wife v/aleida Willis RN

## 2023-12-12 NOTE — TELEPHONE ENCOUNTER
----- Message from Damian Abraham MD sent at 12/12/2023  9:07 AM CST -----  Glucose 280- pls fax labs to pcp and reappoint to that office to address  ----- Message -----  From: Lab, Background User  Sent: 12/12/2023   8:22 AM CST  To: Damian Abraham MD

## 2023-12-12 NOTE — TELEPHONE ENCOUNTER
Copy of todays labs faxed to PCP Dr Prescott for review. Patient is diabetic and monitors his Glucose very closely 4-5 x per day and adjust accordingly with Insulin injections. Patient wife notified of   Glucose readin  Wife and patient are both aware of elevated reading  Wife reports patient had f/u with PCP Dr Prescott recently   Copy of labs faxed to Dr Prescott PCP for review

## 2023-12-13 ENCOUNTER — TELEPHONE (OUTPATIENT)
Dept: ONCOLOGY | Facility: CLINIC | Age: 88
End: 2023-12-13
Payer: MEDICARE

## 2023-12-13 NOTE — TELEPHONE ENCOUNTER
Received call from patient wife Ronel, she calls to report /Juliocesar/patient received his first chemotherapy TXT yesterday 12/12/23. This am his Glucose reading was over 500. Patient had contacted his PCP Dr Prescott regarding their concerns and he had recommended being very vigilant monitoring his glucose readings and starting a sliding scale protocol.   Patient has been a diabetic for years and is very educated regarding how he needs to monitor his glucose and checks it on schedule daily, takes his diabetes medications as directed, and watches his diet very closely.  Patient is waiting on a Free Style Monitor to be delivered to his local pharmacy so checks will be much easier since patient is having increased difficulties with getting blood specimens for his checks due to years of finger pricks.  Wife was informed as we discussed during his Education Class that Juliocesar did receive steroids in his pre-medications and that this will definitely bring his glucose reading up this was to be expected and he would have to reach out to his PCP for help with any medication adjustments. Wife v/u.   Wife was informed if his Glucose readings became very elevated he would need to contact Dr Prescott again for instruction or go to his local ER dept

## 2023-12-14 ENCOUNTER — TELEPHONE (OUTPATIENT)
Dept: ONCOLOGY | Facility: CLINIC | Age: 88
End: 2023-12-14
Payer: MEDICARE

## 2023-12-14 ENCOUNTER — INFUSION (OUTPATIENT)
Dept: ONCOLOGY | Facility: HOSPITAL | Age: 88
End: 2023-12-14
Payer: MEDICARE

## 2023-12-14 VITALS
HEART RATE: 68 BPM | DIASTOLIC BLOOD PRESSURE: 42 MMHG | RESPIRATION RATE: 18 BRPM | SYSTOLIC BLOOD PRESSURE: 139 MMHG | OXYGEN SATURATION: 98 % | TEMPERATURE: 97.5 F

## 2023-12-14 DIAGNOSIS — Z45.2 ENCOUNTER FOR CARE RELATED TO VASCULAR ACCESS PORT: Primary | ICD-10-CM

## 2023-12-14 DIAGNOSIS — C20 RECTAL ADENOCARCINOMA: ICD-10-CM

## 2023-12-14 PROCEDURE — 25010000002 HEPARIN LOCK FLUSH PER 10 UNITS: Performed by: INTERNAL MEDICINE

## 2023-12-14 RX ORDER — SODIUM CHLORIDE 0.9 % (FLUSH) 0.9 %
10 SYRINGE (ML) INJECTION AS NEEDED
Status: CANCELLED | OUTPATIENT
Start: 2023-12-14

## 2023-12-14 RX ORDER — SODIUM CHLORIDE 0.9 % (FLUSH) 0.9 %
10 SYRINGE (ML) INJECTION AS NEEDED
Status: DISCONTINUED | OUTPATIENT
Start: 2023-12-14 | End: 2023-12-14 | Stop reason: HOSPADM

## 2023-12-14 RX ORDER — HEPARIN SODIUM (PORCINE) LOCK FLUSH IV SOLN 100 UNIT/ML 100 UNIT/ML
500 SOLUTION INTRAVENOUS AS NEEDED
Status: DISCONTINUED | OUTPATIENT
Start: 2023-12-14 | End: 2023-12-14 | Stop reason: HOSPADM

## 2023-12-14 RX ORDER — HEPARIN SODIUM (PORCINE) LOCK FLUSH IV SOLN 100 UNIT/ML 100 UNIT/ML
500 SOLUTION INTRAVENOUS AS NEEDED
Status: CANCELLED | OUTPATIENT
Start: 2023-12-14

## 2023-12-14 RX ADMIN — Medication 10 ML: at 08:59

## 2023-12-14 RX ADMIN — HEPARIN SODIUM (PORCINE) LOCK FLUSH IV SOLN 100 UNIT/ML 500 UNITS: 100 SOLUTION at 09:00

## 2023-12-14 NOTE — TELEPHONE ENCOUNTER
This was addressed by Pau. Patient informed to come in now instead of his scheduled appointment. Infusion center to address.

## 2023-12-14 NOTE — TELEPHONE ENCOUNTER
Caller: debbie capone    Relationship: Emergency Contact    Best call back number: [phone]    What is the best time to reach you: ANYTIME    Who are you requesting to speak with (clinical staff, provider,  specific staff member): CLINICAL    Do you know the name of the person who called:     What was the call regarding: DEBBIE CALLING PATIENT NASIMA PACK ALARM IS BEEPING.     CALL TO ADVISE  WAS ON THE PHONE WITH TOBIN IN CLINICAL AND WE GOT DISCONNECTED.    Is it okay if the provider responds through MyChart:

## 2023-12-18 ENCOUNTER — INFUSION (OUTPATIENT)
Dept: ONCOLOGY | Facility: HOSPITAL | Age: 88
End: 2023-12-18
Payer: MEDICARE

## 2023-12-18 VITALS
DIASTOLIC BLOOD PRESSURE: 55 MMHG | RESPIRATION RATE: 18 BRPM | OXYGEN SATURATION: 95 % | SYSTOLIC BLOOD PRESSURE: 158 MMHG | HEART RATE: 73 BPM | TEMPERATURE: 98.6 F

## 2023-12-18 DIAGNOSIS — Z45.2 ENCOUNTER FOR CARE RELATED TO VASCULAR ACCESS PORT: ICD-10-CM

## 2023-12-18 DIAGNOSIS — D50.9 IRON DEFICIENCY ANEMIA, UNSPECIFIED IRON DEFICIENCY ANEMIA TYPE: Primary | ICD-10-CM

## 2023-12-18 PROCEDURE — 25010000002 IRON SUCROSE PER 1 MG: Performed by: INTERNAL MEDICINE

## 2023-12-18 PROCEDURE — 25810000003 SODIUM CHLORIDE 0.9 % SOLUTION: Performed by: INTERNAL MEDICINE

## 2023-12-18 PROCEDURE — 96374 THER/PROPH/DIAG INJ IV PUSH: CPT

## 2023-12-18 PROCEDURE — 25010000002 HEPARIN LOCK FLUSH PER 10 UNITS: Performed by: INTERNAL MEDICINE

## 2023-12-18 PROCEDURE — 96367 TX/PROPH/DG ADDL SEQ IV INF: CPT

## 2023-12-18 RX ORDER — FAMOTIDINE 10 MG/ML
20 INJECTION, SOLUTION INTRAVENOUS AS NEEDED
Status: DISCONTINUED | OUTPATIENT
Start: 2023-12-18 | End: 2023-12-18 | Stop reason: HOSPADM

## 2023-12-18 RX ORDER — SODIUM CHLORIDE 9 MG/ML
20 INJECTION, SOLUTION INTRAVENOUS ONCE
Status: COMPLETED | OUTPATIENT
Start: 2023-12-18 | End: 2023-12-18

## 2023-12-18 RX ORDER — HEPARIN SODIUM (PORCINE) LOCK FLUSH IV SOLN 100 UNIT/ML 100 UNIT/ML
500 SOLUTION INTRAVENOUS AS NEEDED
Status: DISCONTINUED | OUTPATIENT
Start: 2023-12-18 | End: 2023-12-18 | Stop reason: HOSPADM

## 2023-12-18 RX ORDER — SODIUM CHLORIDE 0.9 % (FLUSH) 0.9 %
10 SYRINGE (ML) INJECTION AS NEEDED
Status: DISCONTINUED | OUTPATIENT
Start: 2023-12-18 | End: 2023-12-18 | Stop reason: HOSPADM

## 2023-12-18 RX ORDER — DIPHENHYDRAMINE HYDROCHLORIDE 50 MG/ML
50 INJECTION INTRAMUSCULAR; INTRAVENOUS AS NEEDED
Status: DISCONTINUED | OUTPATIENT
Start: 2023-12-18 | End: 2023-12-18 | Stop reason: HOSPADM

## 2023-12-18 RX ORDER — HEPARIN SODIUM (PORCINE) LOCK FLUSH IV SOLN 100 UNIT/ML 100 UNIT/ML
500 SOLUTION INTRAVENOUS AS NEEDED
OUTPATIENT
Start: 2023-12-18

## 2023-12-18 RX ORDER — SODIUM CHLORIDE 0.9 % (FLUSH) 0.9 %
10 SYRINGE (ML) INJECTION AS NEEDED
OUTPATIENT
Start: 2023-12-18

## 2023-12-18 RX ADMIN — Medication 10 ML: at 11:46

## 2023-12-18 RX ADMIN — IRON SUCROSE 200 MG: 20 INJECTION, SOLUTION INTRAVENOUS at 11:08

## 2023-12-18 RX ADMIN — SODIUM CHLORIDE 20 ML/HR: 9 INJECTION, SOLUTION INTRAVENOUS at 11:08

## 2023-12-18 RX ADMIN — HEPARIN SODIUM (PORCINE) LOCK FLUSH IV SOLN 100 UNIT/ML 500 UNITS: 100 SOLUTION at 11:46

## 2023-12-27 ENCOUNTER — APPOINTMENT (OUTPATIENT)
Dept: ONCOLOGY | Facility: HOSPITAL | Age: 88
End: 2023-12-27
Payer: MEDICARE

## 2023-12-27 ENCOUNTER — LAB (OUTPATIENT)
Dept: LAB | Facility: HOSPITAL | Age: 88
End: 2023-12-27
Payer: MEDICARE

## 2023-12-27 ENCOUNTER — INFUSION (OUTPATIENT)
Dept: ONCOLOGY | Facility: HOSPITAL | Age: 88
End: 2023-12-27
Payer: MEDICARE

## 2023-12-27 VITALS
BODY MASS INDEX: 26.2 KG/M2 | RESPIRATION RATE: 18 BRPM | DIASTOLIC BLOOD PRESSURE: 57 MMHG | HEIGHT: 70 IN | WEIGHT: 183 LBS | OXYGEN SATURATION: 96 % | TEMPERATURE: 97.6 F | HEART RATE: 73 BPM | SYSTOLIC BLOOD PRESSURE: 153 MMHG

## 2023-12-27 DIAGNOSIS — C20 RECTAL ADENOCARCINOMA: Primary | ICD-10-CM

## 2023-12-27 DIAGNOSIS — C20 RECTAL ADENOCARCINOMA: ICD-10-CM

## 2023-12-27 DIAGNOSIS — D50.9 IRON DEFICIENCY ANEMIA, UNSPECIFIED IRON DEFICIENCY ANEMIA TYPE: ICD-10-CM

## 2023-12-27 DIAGNOSIS — Z45.2 ENCOUNTER FOR CARE RELATED TO VASCULAR ACCESS PORT: ICD-10-CM

## 2023-12-27 LAB
ALBUMIN SERPL-MCNC: 3.4 G/DL (ref 3.5–5.2)
ALBUMIN/GLOB SERPL: 1.1 G/DL
ALP SERPL-CCNC: 109 U/L (ref 39–117)
ALT SERPL W P-5'-P-CCNC: 16 U/L (ref 1–41)
ANION GAP SERPL CALCULATED.3IONS-SCNC: 8 MMOL/L (ref 5–15)
AST SERPL-CCNC: 18 U/L (ref 1–40)
BASOPHILS # BLD AUTO: 0.01 10*3/MM3 (ref 0–0.2)
BASOPHILS NFR BLD AUTO: 0.3 % (ref 0–1.5)
BILIRUB SERPL-MCNC: 0.4 MG/DL (ref 0–1.2)
BUN SERPL-MCNC: 15 MG/DL (ref 8–23)
BUN/CREAT SERPL: 16.1 (ref 7–25)
CALCIUM SPEC-SCNC: 8.4 MG/DL (ref 8.2–9.6)
CHLORIDE SERPL-SCNC: 106 MMOL/L (ref 98–107)
CO2 SERPL-SCNC: 30 MMOL/L (ref 22–29)
CREAT SERPL-MCNC: 0.93 MG/DL (ref 0.76–1.27)
DEPRECATED RDW RBC AUTO: 48.4 FL (ref 37–54)
EGFRCR SERPLBLD CKD-EPI 2021: 77.5 ML/MIN/1.73
EOSINOPHIL # BLD AUTO: 0.08 10*3/MM3 (ref 0–0.4)
EOSINOPHIL NFR BLD AUTO: 2.3 % (ref 0.3–6.2)
ERYTHROCYTE [DISTWIDTH] IN BLOOD BY AUTOMATED COUNT: 14.8 % (ref 12.3–15.4)
GLOBULIN UR ELPH-MCNC: 3 GM/DL
GLUCOSE SERPL-MCNC: 270 MG/DL (ref 65–99)
HCT VFR BLD AUTO: 36.1 % (ref 37.5–51)
HGB BLD-MCNC: 11.8 G/DL (ref 13–17.7)
IMM GRANULOCYTES # BLD AUTO: 0 10*3/MM3 (ref 0–0.05)
IMM GRANULOCYTES NFR BLD AUTO: 0 % (ref 0–0.5)
LYMPHOCYTES # BLD AUTO: 1.54 10*3/MM3 (ref 0.7–3.1)
LYMPHOCYTES NFR BLD AUTO: 45 % (ref 19.6–45.3)
MCH RBC QN AUTO: 30 PG (ref 26.6–33)
MCHC RBC AUTO-ENTMCNC: 32.7 G/DL (ref 31.5–35.7)
MCV RBC AUTO: 91.9 FL (ref 79–97)
MONOCYTES # BLD AUTO: 0.47 10*3/MM3 (ref 0.1–0.9)
MONOCYTES NFR BLD AUTO: 13.7 % (ref 5–12)
NEUTROPHILS NFR BLD AUTO: 1.32 10*3/MM3 (ref 1.7–7)
NEUTROPHILS NFR BLD AUTO: 38.7 % (ref 42.7–76)
NRBC BLD AUTO-RTO: 0 /100 WBC (ref 0–0.2)
PLATELET # BLD AUTO: 124 10*3/MM3 (ref 140–450)
PMV BLD AUTO: 9.6 FL (ref 6–12)
POTASSIUM SERPL-SCNC: 3.8 MMOL/L (ref 3.5–5.2)
PROT SERPL-MCNC: 6.4 G/DL (ref 6–8.5)
RBC # BLD AUTO: 3.93 10*6/MM3 (ref 4.14–5.8)
SODIUM SERPL-SCNC: 144 MMOL/L (ref 136–145)
WBC NRBC COR # BLD AUTO: 3.42 10*3/MM3 (ref 3.4–10.8)

## 2023-12-27 PROCEDURE — 85025 COMPLETE CBC W/AUTO DIFF WBC: CPT

## 2023-12-27 PROCEDURE — 36415 COLL VENOUS BLD VENIPUNCTURE: CPT

## 2023-12-27 PROCEDURE — 96365 THER/PROPH/DIAG IV INF INIT: CPT

## 2023-12-27 PROCEDURE — 25010000002 IRON SUCROSE PER 1 MG: Performed by: INTERNAL MEDICINE

## 2023-12-27 PROCEDURE — 80053 COMPREHEN METABOLIC PANEL: CPT

## 2023-12-27 PROCEDURE — 25810000003 SODIUM CHLORIDE 0.9 % SOLUTION: Performed by: INTERNAL MEDICINE

## 2023-12-27 PROCEDURE — 96374 THER/PROPH/DIAG INJ IV PUSH: CPT

## 2023-12-27 PROCEDURE — 25010000002 HEPARIN LOCK FLUSH PER 10 UNITS: Performed by: INTERNAL MEDICINE

## 2023-12-27 RX ORDER — PALONOSETRON 0.05 MG/ML
0.25 INJECTION, SOLUTION INTRAVENOUS ONCE
Status: CANCELLED | OUTPATIENT
Start: 2023-12-27

## 2023-12-27 RX ORDER — SODIUM CHLORIDE 0.9 % (FLUSH) 0.9 %
10 SYRINGE (ML) INJECTION AS NEEDED
Status: DISCONTINUED | OUTPATIENT
Start: 2023-12-27 | End: 2023-12-27 | Stop reason: HOSPADM

## 2023-12-27 RX ORDER — FAMOTIDINE 10 MG/ML
20 INJECTION, SOLUTION INTRAVENOUS AS NEEDED
Status: DISCONTINUED | OUTPATIENT
Start: 2023-12-27 | End: 2023-12-27 | Stop reason: HOSPADM

## 2023-12-27 RX ORDER — DEXTROSE MONOHYDRATE 50 MG/ML
20 INJECTION, SOLUTION INTRAVENOUS ONCE
Status: CANCELLED | OUTPATIENT
Start: 2023-12-27

## 2023-12-27 RX ORDER — DIPHENHYDRAMINE HYDROCHLORIDE 50 MG/ML
50 INJECTION INTRAMUSCULAR; INTRAVENOUS AS NEEDED
Status: CANCELLED | OUTPATIENT
Start: 2023-12-27

## 2023-12-27 RX ORDER — SODIUM CHLORIDE 0.9 % (FLUSH) 0.9 %
10 SYRINGE (ML) INJECTION AS NEEDED
OUTPATIENT
Start: 2023-12-27

## 2023-12-27 RX ORDER — FAMOTIDINE 10 MG/ML
20 INJECTION, SOLUTION INTRAVENOUS AS NEEDED
Status: CANCELLED | OUTPATIENT
Start: 2023-12-27

## 2023-12-27 RX ORDER — HEPARIN SODIUM (PORCINE) LOCK FLUSH IV SOLN 100 UNIT/ML 100 UNIT/ML
500 SOLUTION INTRAVENOUS AS NEEDED
Status: DISCONTINUED | OUTPATIENT
Start: 2023-12-27 | End: 2023-12-27 | Stop reason: HOSPADM

## 2023-12-27 RX ORDER — SODIUM CHLORIDE 9 MG/ML
20 INJECTION, SOLUTION INTRAVENOUS ONCE
Status: COMPLETED | OUTPATIENT
Start: 2023-12-27 | End: 2023-12-27

## 2023-12-27 RX ORDER — FLUOROURACIL 50 MG/ML
320 INJECTION, SOLUTION INTRAVENOUS ONCE
Status: CANCELLED | OUTPATIENT
Start: 2023-12-27

## 2023-12-27 RX ORDER — DIPHENHYDRAMINE HYDROCHLORIDE 50 MG/ML
50 INJECTION INTRAMUSCULAR; INTRAVENOUS AS NEEDED
Status: DISCONTINUED | OUTPATIENT
Start: 2023-12-27 | End: 2023-12-27 | Stop reason: HOSPADM

## 2023-12-27 RX ORDER — HEPARIN SODIUM (PORCINE) LOCK FLUSH IV SOLN 100 UNIT/ML 100 UNIT/ML
500 SOLUTION INTRAVENOUS AS NEEDED
OUTPATIENT
Start: 2023-12-27

## 2023-12-27 RX ADMIN — IRON SUCROSE 200 MG: 20 INJECTION, SOLUTION INTRAVENOUS at 09:53

## 2023-12-27 RX ADMIN — Medication 10 ML: at 10:32

## 2023-12-27 RX ADMIN — SODIUM CHLORIDE 20 ML/HR: 9 INJECTION, SOLUTION INTRAVENOUS at 09:53

## 2023-12-27 RX ADMIN — HEPARIN SODIUM (PORCINE) LOCK FLUSH IV SOLN 100 UNIT/ML 500 UNITS: 100 SOLUTION at 10:32

## 2023-12-27 NOTE — PROGRESS NOTES
0989 Contacted Sabra with MD office r/t ANC 1.3. Per Dr WOOD hold treatment today and move out a week. Ok to give iron today. GENIE Willis RN

## 2024-01-03 ENCOUNTER — INFUSION (OUTPATIENT)
Dept: ONCOLOGY | Facility: HOSPITAL | Age: 89
End: 2024-01-03
Payer: MEDICARE

## 2024-01-03 ENCOUNTER — LAB (OUTPATIENT)
Dept: LAB | Facility: HOSPITAL | Age: 89
End: 2024-01-03
Payer: MEDICARE

## 2024-01-03 VITALS
BODY MASS INDEX: 26.48 KG/M2 | SYSTOLIC BLOOD PRESSURE: 164 MMHG | HEIGHT: 70 IN | DIASTOLIC BLOOD PRESSURE: 60 MMHG | TEMPERATURE: 97.4 F | HEART RATE: 82 BPM | OXYGEN SATURATION: 94 % | RESPIRATION RATE: 18 BRPM | WEIGHT: 185 LBS

## 2024-01-03 DIAGNOSIS — C20 RECTAL ADENOCARCINOMA: Primary | ICD-10-CM

## 2024-01-03 DIAGNOSIS — C20 RECTAL ADENOCARCINOMA: ICD-10-CM

## 2024-01-03 LAB
ALBUMIN SERPL-MCNC: 3.5 G/DL (ref 3.5–5.2)
ALBUMIN/GLOB SERPL: 1.1 G/DL
ALP SERPL-CCNC: 113 U/L (ref 39–117)
ALT SERPL W P-5'-P-CCNC: 13 U/L (ref 1–41)
ANION GAP SERPL CALCULATED.3IONS-SCNC: 9 MMOL/L (ref 5–15)
AST SERPL-CCNC: 17 U/L (ref 1–40)
BASOPHILS # BLD AUTO: 0.04 10*3/MM3 (ref 0–0.2)
BASOPHILS NFR BLD AUTO: 0.6 % (ref 0–1.5)
BILIRUB SERPL-MCNC: 0.3 MG/DL (ref 0–1.2)
BUN SERPL-MCNC: 7 MG/DL (ref 8–23)
BUN/CREAT SERPL: 8.3 (ref 7–25)
CALCIUM SPEC-SCNC: 8.6 MG/DL (ref 8.2–9.6)
CHLORIDE SERPL-SCNC: 102 MMOL/L (ref 98–107)
CO2 SERPL-SCNC: 32 MMOL/L (ref 22–29)
CREAT SERPL-MCNC: 0.84 MG/DL (ref 0.76–1.27)
DEPRECATED RDW RBC AUTO: 51.5 FL (ref 37–54)
EGFRCR SERPLBLD CKD-EPI 2021: 82.3 ML/MIN/1.73
EOSINOPHIL # BLD AUTO: 0.11 10*3/MM3 (ref 0–0.4)
EOSINOPHIL NFR BLD AUTO: 1.7 % (ref 0.3–6.2)
ERYTHROCYTE [DISTWIDTH] IN BLOOD BY AUTOMATED COUNT: 15 % (ref 12.3–15.4)
GLOBULIN UR ELPH-MCNC: 3.1 GM/DL
GLUCOSE SERPL-MCNC: 236 MG/DL (ref 65–99)
HCT VFR BLD AUTO: 40.9 % (ref 37.5–51)
HGB BLD-MCNC: 13.1 G/DL (ref 13–17.7)
IMM GRANULOCYTES # BLD AUTO: 0.07 10*3/MM3 (ref 0–0.05)
IMM GRANULOCYTES NFR BLD AUTO: 1.1 % (ref 0–0.5)
LYMPHOCYTES # BLD AUTO: 2.43 10*3/MM3 (ref 0.7–3.1)
LYMPHOCYTES NFR BLD AUTO: 36.5 % (ref 19.6–45.3)
MCH RBC QN AUTO: 30.3 PG (ref 26.6–33)
MCHC RBC AUTO-ENTMCNC: 32 G/DL (ref 31.5–35.7)
MCV RBC AUTO: 94.5 FL (ref 79–97)
MONOCYTES # BLD AUTO: 0.75 10*3/MM3 (ref 0.1–0.9)
MONOCYTES NFR BLD AUTO: 11.3 % (ref 5–12)
NEUTROPHILS NFR BLD AUTO: 3.25 10*3/MM3 (ref 1.7–7)
NEUTROPHILS NFR BLD AUTO: 48.8 % (ref 42.7–76)
NRBC BLD AUTO-RTO: 0 /100 WBC (ref 0–0.2)
PLATELET # BLD AUTO: 192 10*3/MM3 (ref 140–450)
PMV BLD AUTO: 9.2 FL (ref 6–12)
POTASSIUM SERPL-SCNC: 3.9 MMOL/L (ref 3.5–5.2)
PROT SERPL-MCNC: 6.6 G/DL (ref 6–8.5)
RBC # BLD AUTO: 4.33 10*6/MM3 (ref 4.14–5.8)
SODIUM SERPL-SCNC: 143 MMOL/L (ref 136–145)
WBC NRBC COR # BLD AUTO: 6.65 10*3/MM3 (ref 3.4–10.8)

## 2024-01-03 PROCEDURE — 96415 CHEMO IV INFUSION ADDL HR: CPT

## 2024-01-03 PROCEDURE — 36415 COLL VENOUS BLD VENIPUNCTURE: CPT

## 2024-01-03 PROCEDURE — 25010000002 PALONOSETRON PER 25 MCG: Performed by: INTERNAL MEDICINE

## 2024-01-03 PROCEDURE — 96367 TX/PROPH/DG ADDL SEQ IV INF: CPT

## 2024-01-03 PROCEDURE — 0 DEXTROSE 5 % SOLUTION 250 ML FLEX CONT: Performed by: INTERNAL MEDICINE

## 2024-01-03 PROCEDURE — 96411 CHEMO IV PUSH ADDL DRUG: CPT

## 2024-01-03 PROCEDURE — 96413 CHEMO IV INFUSION 1 HR: CPT

## 2024-01-03 PROCEDURE — 0 DEXTROSE 5 % SOLUTION: Performed by: INTERNAL MEDICINE

## 2024-01-03 PROCEDURE — 25010000002 FLUOROURACIL PER 500 MG: Performed by: INTERNAL MEDICINE

## 2024-01-03 PROCEDURE — 25010000002 LEUCOVORIN CALCIUM PER 50 MG: Performed by: INTERNAL MEDICINE

## 2024-01-03 PROCEDURE — 96368 THER/DIAG CONCURRENT INF: CPT

## 2024-01-03 PROCEDURE — 80053 COMPREHEN METABOLIC PANEL: CPT

## 2024-01-03 PROCEDURE — 25010000002 OXALIPLATIN PER 0.5 MG: Performed by: INTERNAL MEDICINE

## 2024-01-03 PROCEDURE — 25010000002 FOSAPREPITANT PER 1 MG: Performed by: INTERNAL MEDICINE

## 2024-01-03 PROCEDURE — G0498 CHEMO EXTEND IV INFUS W/PUMP: HCPCS

## 2024-01-03 PROCEDURE — 96375 TX/PRO/DX INJ NEW DRUG ADDON: CPT

## 2024-01-03 PROCEDURE — 85025 COMPLETE CBC W/AUTO DIFF WBC: CPT

## 2024-01-03 PROCEDURE — 25010000002 DEXAMETHASONE SODIUM PHOSPHATE 100 MG/10ML SOLUTION: Performed by: INTERNAL MEDICINE

## 2024-01-03 RX ORDER — DIPHENHYDRAMINE HYDROCHLORIDE 50 MG/ML
50 INJECTION INTRAMUSCULAR; INTRAVENOUS AS NEEDED
Status: DISCONTINUED | OUTPATIENT
Start: 2024-01-03 | End: 2024-01-03 | Stop reason: HOSPADM

## 2024-01-03 RX ORDER — FLUOROURACIL 50 MG/ML
320 INJECTION, SOLUTION INTRAVENOUS ONCE
Status: COMPLETED | OUTPATIENT
Start: 2024-01-03 | End: 2024-01-03

## 2024-01-03 RX ORDER — PALONOSETRON 0.05 MG/ML
0.25 INJECTION, SOLUTION INTRAVENOUS ONCE
Status: COMPLETED | OUTPATIENT
Start: 2024-01-03 | End: 2024-01-03

## 2024-01-03 RX ORDER — FAMOTIDINE 10 MG/ML
20 INJECTION, SOLUTION INTRAVENOUS AS NEEDED
Status: DISCONTINUED | OUTPATIENT
Start: 2024-01-03 | End: 2024-01-03 | Stop reason: HOSPADM

## 2024-01-03 RX ORDER — DEXTROSE MONOHYDRATE 50 MG/ML
20 INJECTION, SOLUTION INTRAVENOUS ONCE
Status: COMPLETED | OUTPATIENT
Start: 2024-01-03 | End: 2024-01-03

## 2024-01-03 RX ADMIN — FOSAPREPITANT 150 MG: 150 INJECTION, POWDER, LYOPHILIZED, FOR SOLUTION INTRAVENOUS at 09:14

## 2024-01-03 RX ADMIN — DEXAMETHASONE SODIUM PHOSPHATE 12 MG: 10 INJECTION, SOLUTION INTRAMUSCULAR; INTRAVENOUS at 08:56

## 2024-01-03 RX ADMIN — FLUOROURACIL 3840 MG: 50 INJECTION, SOLUTION INTRAVENOUS at 12:28

## 2024-01-03 RX ADMIN — DEXTROSE MONOHYDRATE 20 ML/HR: 50 INJECTION, SOLUTION INTRAVENOUS at 08:53

## 2024-01-03 RX ADMIN — FLUOROURACIL 640 MG: 50 INJECTION, SOLUTION INTRAVENOUS at 12:25

## 2024-01-03 RX ADMIN — OXALIPLATIN 135 MG: 5 INJECTION, SOLUTION, CONCENTRATE INTRAVENOUS at 09:48

## 2024-01-03 RX ADMIN — LEUCOVORIN CALCIUM 640 MG: 350 INJECTION, POWDER, LYOPHILIZED, FOR SUSPENSION INTRAMUSCULAR; INTRAVENOUS at 09:49

## 2024-01-03 RX ADMIN — PALONOSETRON HYDROCHLORIDE 0.25 MG: 0.25 INJECTION, SOLUTION INTRAVENOUS at 08:54

## 2024-01-05 ENCOUNTER — INFUSION (OUTPATIENT)
Dept: ONCOLOGY | Facility: HOSPITAL | Age: 89
End: 2024-01-05
Payer: MEDICARE

## 2024-01-05 VITALS
TEMPERATURE: 98 F | HEART RATE: 64 BPM | RESPIRATION RATE: 18 BRPM | OXYGEN SATURATION: 99 % | DIASTOLIC BLOOD PRESSURE: 47 MMHG | SYSTOLIC BLOOD PRESSURE: 135 MMHG

## 2024-01-05 DIAGNOSIS — Z45.2 ENCOUNTER FOR CARE RELATED TO VASCULAR ACCESS PORT: ICD-10-CM

## 2024-01-05 DIAGNOSIS — C20 RECTAL ADENOCARCINOMA: Primary | ICD-10-CM

## 2024-01-05 PROCEDURE — 25010000002 HEPARIN LOCK FLUSH PER 10 UNITS: Performed by: INTERNAL MEDICINE

## 2024-01-05 RX ORDER — SODIUM CHLORIDE 0.9 % (FLUSH) 0.9 %
10 SYRINGE (ML) INJECTION AS NEEDED
Status: DISCONTINUED | OUTPATIENT
Start: 2024-01-05 | End: 2024-01-05 | Stop reason: HOSPADM

## 2024-01-05 RX ORDER — SODIUM CHLORIDE 0.9 % (FLUSH) 0.9 %
10 SYRINGE (ML) INJECTION AS NEEDED
OUTPATIENT
Start: 2024-01-05

## 2024-01-05 RX ORDER — HEPARIN SODIUM (PORCINE) LOCK FLUSH IV SOLN 100 UNIT/ML 100 UNIT/ML
500 SOLUTION INTRAVENOUS AS NEEDED
OUTPATIENT
Start: 2024-01-05

## 2024-01-05 RX ORDER — HEPARIN SODIUM (PORCINE) LOCK FLUSH IV SOLN 100 UNIT/ML 100 UNIT/ML
500 SOLUTION INTRAVENOUS AS NEEDED
Status: DISCONTINUED | OUTPATIENT
Start: 2024-01-05 | End: 2024-01-05 | Stop reason: HOSPADM

## 2024-01-05 RX ADMIN — HEPARIN SODIUM (PORCINE) LOCK FLUSH IV SOLN 100 UNIT/ML 500 UNITS: 100 SOLUTION at 10:23

## 2024-01-05 RX ADMIN — Medication 10 ML: at 10:22

## 2024-01-10 DIAGNOSIS — C20 RECTAL ADENOCARCINOMA: Primary | ICD-10-CM

## 2024-01-10 RX ORDER — FAMOTIDINE 10 MG/ML
20 INJECTION, SOLUTION INTRAVENOUS AS NEEDED
OUTPATIENT
Start: 2024-01-17

## 2024-01-10 RX ORDER — PALONOSETRON 0.05 MG/ML
0.25 INJECTION, SOLUTION INTRAVENOUS ONCE
OUTPATIENT
Start: 2024-01-17

## 2024-01-10 RX ORDER — FLUOROURACIL 50 MG/ML
320 INJECTION, SOLUTION INTRAVENOUS ONCE
OUTPATIENT
Start: 2024-01-17

## 2024-01-10 RX ORDER — DEXTROSE MONOHYDRATE 50 MG/ML
20 INJECTION, SOLUTION INTRAVENOUS ONCE
OUTPATIENT
Start: 2024-01-17

## 2024-01-10 RX ORDER — DIPHENHYDRAMINE HYDROCHLORIDE 50 MG/ML
50 INJECTION INTRAMUSCULAR; INTRAVENOUS AS NEEDED
OUTPATIENT
Start: 2024-01-17

## 2024-01-16 NOTE — PROGRESS NOTES
"[facility]W ONC [facility] HEMATOLOGY & ONCOLOGY  [address]  [facility] [address] [phone]    Patient Name: Juliocesar Tellez  Encounter Date: 01/22/2024  YOB: 1932  Patient Number: [id number]      REASON FOR VISIT: Juliocesar Tellez is a 89 yoM who returns in follow-up of stage III rectal adenocarcinoma.  He is currently receiving neoadjuvant FOLFOX- C1, 12/12/23; C2, 1/3/24. He is here with his spouse, Cristina.    I have reviewed the HPI and verified with the patient the accuracy of it. No changes to interval history since the information was documented. Damian Abraham MD 01/22/24      Diagnostic abnormalities:  --medical history includes diabetes, SARAHI, arthritis, BPH, hyperlipidemia, rectal bleeding and recently diagnosed rectal adenocarcinoma.  --9/18/23- Hgb 12.9 (pcp office)  --10/10/23-referred by Dr. Gerardo Prescott to GI for bright red rectal bleeding x 1 month.  No associated rectal pain, no weight loss no abdominal pain, no lightheadedness, dizziness nor black stool.  --11/9/23- Colonoscopy:  Rectal mass.  Malignant partially obstructing tumor from 4 to 11 cm proximal to the anus.  Biopsied.  Exam otherwise normal on direct and retroflexion views.  Final diagnosis:Large intestine, designated \"rectal mass\".  Invasive adenocarcinoma, well to moderately differentiated.  MSI by PCR: MSI-stable (LORENZO)  --11/20/23- Today the patient is seen for management considerations.  Sodium is 146 otherwise normal CMP, ferritin 24 (L), iron 46 (L), iron saturation 13% (L), B12 380, folate 19.7, CEA 15 (H), Hgb 12.1, MCV 93.3, platelets 196,000, WBC 8.67.  --12/1/23-CT chest-No definite evidence of metastatic disease in the chest. There is a 7 mm nodular density along the right minor fissure which is likely an intrafissural lymph node or small area of atelectasis/scarring. However, recommend special attention on follow-up imaging to exclude a pulmonary " nodule. Advanced pulmonary fibrosis, UIP pattern.  Mild bilateral hilar lymphadenopathy, likely reactive.   --12/1/23- CT abdomen/pelvis-  Thickening of the wall of the rectum consistent with the history of  rectal neoplasm. The outer wall of the rectum is somewhat ill-defined. Therefore, there may be some extension through the wall. In addition, the fat plane between the rectum and prostate is obscured suggesting possible extension of tumor into this area. There is a 5 mm lymph node in the left perirectal fat image 71 series 3. There is an additional small lymph node more superiorly in the pelvic mesenteric fat measuring 6-7 mm in short axis diameter. No other evidence of metastatic disease in the abdomen or pelvis. Bilateral inguinal hernias. The hernia on the right contains a portion of the bladder wall and peritoneal fat. The hernia on the left contains peritoneal fat. There is thickening of the bladder wall. The bladder is under distended. Wall thickening may be due to muscular hypertrophy as there is enlargement of the prostate. Artifact of under distention, infection  and inflammation are considered. Gallstones in the gallbladder.  --12/5/23- flexible sigmoidoscopy with rectal EUS, Dr. Calderon Cano  Known rectal mass was 80% circumferential and invasive appearing with ulceration extending from 4 cm to 14 cm  Endosonography revealed an irregular appearing heterogeneous mass measuring over 4.4 cm in greatest dimension with evidence of invasion through and obliteration of the muscularis propria.  Irregular borders were noted with questionable spread through the rectal wall.  The lesion did not appear to invade the prostate.  No definite lymphadenopathy was appreciated.    Previous interventions:  -- Venofer  mg- 12/4/23; 12/11/23; 12/18/23; 12/27/23  --neoadjuvant FOLFOX- C1, 12/12/23; C2, 1/3/24.          PAST MEDICAL HISTORY:  ALLERGIES:  Allergies   Allergen Reactions    Sulfa Antibiotics Other (See  Comments)     Flu like symptoms     CURRENT MEDICATIONS:  Outpatient Encounter Medications as of 1/22/2024   Medication Sig Dispense Refill    acetaminophen (Tylenol) 325 MG tablet Take 3 tablets by mouth Every 8 (Eight) Hours. Take every 8 hours for 3 days then take prn as needed.      aspirin 81 MG EC tablet Take 1 tablet by mouth Daily.      atorvastatin (LIPITOR) 40 MG tablet Take 1 tablet by mouth Daily.      azelastine (ASTELIN) 0.1 % nasal spray 2 sprays into the nostril(s) as directed by provider 2 (Two) Times a Day. Use in each nostril as directed      cetirizine (zyrTEC) 10 MG tablet Take 1 tablet by mouth Daily.      dicyclomine (BENTYL) 10 MG capsule Take 1 capsule by mouth 4 (Four) Times a Day Before Meals & at Bedtime.      insulin lispro protamine-insulin lispro (humaLOG 75-25) (75-25) 100 UNIT/ML suspension injection Inject  under the skin into the appropriate area as directed Take As Directed. 60 MORNING 35 EVENING      ketoconazole (NIZORAL) 2 % cream Apply 1 application  topically to the appropriate area as directed Daily.      ketoconazole (NIZORAL) 2 % cream Apply 1 application  topically to the appropriate area as directed Daily.      lidocaine-prilocaine (EMLA) 2.5-2.5 % cream 30 minutes prior to access apply generous amount of Emla Cream to port site and cover with clear plastic wrap until ready for use 1 each 2    lisinopril (PRINIVIL,ZESTRIL) 2.5 MG tablet Take 1 tablet by mouth Daily.      magnesium oxide (MAGOX) 400 (241.3 Mg) MG tablet tablet Take 1 tablet by mouth Daily.      Menthol-Zinc Oxide (Calmoseptine) 0.44-20.6 % ointment Apply 1 application  topically to the appropriate area as directed 2 (Two) Times a Day.      Olopatadine HCl (PATADAY OP) Apply  to eye(s) as directed by provider.      ondansetron (Zofran) 8 MG tablet Take 1 tablet by mouth Every 8 (Eight) Hours As Needed for Nausea or Vomiting. 30 tablet 2    prochlorperazine (COMPAZINE) 10 MG tablet Take 1 tablet by mouth  "Every 6 (Six) Hours As Needed for Nausea or Vomiting. 60 tablet 1    tamsulosin (FLOMAX) 0.4 MG capsule 24 hr capsule TAKE ONE CAPSULE DAILY      timolol (BLOCADREN) 5 MG tablet Take 1 tablet by mouth 2 (Two) Times a Day.      TRAVOPROST OP Apply  to eye(s) as directed by provider.       No facility-administered encounter medications on file as of 1/22/2024.     Adult illnesses:  Arthritis  BPH  Diabetes  Hemorrhoids  Hyperlipidemia  SARAHI  Primary open-angle glaucoma  Pseudophakia  Rectal bleeding  Rectal carcinoma    Past surgeries:  Tonsillectomy  Colonoscopy with 8 polyps, tubular adenoma, few diverticula, 12/31/2013  Colonoscopy, 11/9/23-rectal mass.  Malignant partially obstructing tumor from 4 to 11 cm proximal to the anus.  Biopsied.  Exam otherwise normal on direct and retroflexion views.  Final diagnosis:Large intestine, designated \"rectal mass\": -Invasive adenocarcinoma, well to moderately differentiated  Port-A-Cath placement in the right cephalic vein, 11/29/23-Dr. Ortiz    ADULT ILLNESSES:  Patient Active Problem List   Diagnosis Code    Chest pain, atypical R07.89    Diabetes mellitus E11.9    Elevated blood pressure reading without diagnosis of hypertension R03.0    BRBPR (bright red blood per rectum) K62.5    Rectal adenocarcinoma C20    Iron deficiency anemia D50.9    Encounter for care related to vascular access port Z45.2    Non-smoker Z78.9     SURGERIES:  Past Surgical History:   Procedure Laterality Date    COLONOSCOPY  12/31/2013    8 polyps, tubular adenoma, a few divertucula    COLONOSCOPY N/A 11/09/2023    Procedure: SIGMOIDOSCOPY FLEXIBLE;  Surgeon: Terry Malik MD;  Location: [facility] ENDOSCOPY;  Service: Gastroenterology;  Laterality: N/A;  preop; BRBPR  postop rectal mass  PCP Gerardo Prescott    TONSILLECTOMY      VENOUS ACCESS DEVICE (PORT) INSERTION N/A 11/29/2023    Procedure: Single Lumen Port-a-cath insertion with flouroscopy;  Surgeon: Marquita Ortiz MD;  Location: [facility]" "[facility] OR;  Service: General;  Laterality: N/A;     HEALTH MAINTENANCE ITEMS:  Health Maintenance Due   Topic Date Due    URINE MICROALBUMIN  Never done    COVID-19 Vaccine (1) Never done    Pneumococcal Vaccine 65+ (1 of 2 - PCV) Never done    ZOSTER VACCINE (2 of 3) 03/31/2014    ANNUAL WELLNESS VISIT  Never done    HEMOGLOBIN A1C  07/15/2020    DIABETIC EYE EXAM  Never done    TDAP/TD VACCINES (2 - Tdap) 02/21/2022    INFLUENZA VACCINE  08/01/2023    LIPID PANEL  09/27/2023       <no information>  Last Completed Colonoscopy       This patient has no relevant Health Maintenance data.            There is no immunization history on file for this patient.  Last Completed Mammogram       This patient has no relevant Health Maintenance data.              FAMILY HISTORY:  Family History   Problem Relation Age of Onset    No Known Problems Mother     No Known Problems Father     Liver cancer Sister     Colon cancer Neg Hx      SOCIAL HISTORY:  Social History     Socioeconomic History    Marital status:    Tobacco Use    Smoking status: Never    Smokeless tobacco: Never   Vaping Use    Vaping Use: Never used   Substance and Sexual Activity    Alcohol use: Never    Drug use: Never    Sexual activity: Defer       REVIEW OF SYSTEMS:  Review of Systems   Constitutional:  Positive for appetite change (\"less on chemo\". Declines appetite stimulant). Negative for activity change (\"about the same'), fatigue and unexpected weight loss.        Manages his ADLs, to include chores, \"some\" but not running errands and no longer drives. Is up and about, \"a little less than 50%.\" Ambulates with a cane at home    Chemo tolerance:  Fatigue.  + mild cold associated neuropathy of the hands, but denies loss of fine motor function.  Soft stools without overt diarrhea.  Uses Imodium sparingly, \"once in the last week.\"  No mouth sores, no nausea, no vomiting, no skin changes.   HENT: Negative.     Eyes: Negative.    Respiratory:  Negative for " "shortness of breath (Baseline exertional dyspnea but no SOB with routine activities).         SARAHI NOT on CPAP   Cardiovascular: Negative.    Gastrointestinal:  Positive for blood in stool (with BMs. \"not like it was\") and diarrhea (Loose stools particularly at night ~ 3x/day.  Uses Imodium sparingly.  Uses liners). Negative for abdominal distention, abdominal pain, anal bleeding, constipation, nausea, rectal pain and vomiting.   Endocrine: Negative.    Genitourinary: Negative.    Musculoskeletal:  Positive for arthralgias (Chronic pains in hands, right ankle (prior injury)neck and lower back), back pain (Chronic), myalgias (with ambulation, \"50 feet\") and neck stiffness (Chronic \"stiffness\"). Negative for neck pain.   Skin: Negative.    Neurological: Negative.    Hematological: Negative.    Psychiatric/Behavioral: Negative.         /60   Pulse 91   Temp 98.8 °F (37.1 °C) (Temporal)   Resp 18   Ht 177.8 cm (70\")   Wt 83 kg (183 lb)   SpO2 96%   BMI 26.26 kg/m²  Body surface area is 2.01 meters squared.  Pain Score    01/22/24 1020   PainSc: 0-No pain           Physical Exam  Vitals and nursing note reviewed.   Constitutional:       Comments: Pleasant, cooperative, heavyset middle-aged elderly male.  Brought in by wheelchair but ambulatory in the room.  ECOG 2 (same).      Lost 2 lb (no weight changes at his prior visit) since the last visit   HENT:      Head: Normocephalic and atraumatic.   Eyes:      General: No scleral icterus.     Extraocular Movements: Extraocular movements intact.      Pupils: Pupils are equal, round, and reactive to light.   Cardiovascular:      Rate and Rhythm: Normal rate.   Pulmonary:      Effort: Pulmonary effort is normal.      Comments: Port in the right upper chest is well seated  Abdominal:      Palpations: Abdomen is soft. There is no mass (RUQ fullness/hepatomegaly?).      Tenderness: There is no abdominal tenderness.   Musculoskeletal:         General: Swelling present. " "Normal range of motion.      Cervical back: Normal range of motion.      Right lower leg: Edema (1+ (chronic)- wearing an ankle brace for prior ankle injury) present.      Left lower leg: Edema (1+ (chronic)) present.   Lymphadenopathy:      Cervical: No cervical adenopathy.   Skin:     General: Skin is warm.   Neurological:      General: No focal deficit present.      Mental Status: He is alert and oriented to person, place, and time.   Psychiatric:         Mood and Affect: Mood normal.         Behavior: Behavior normal.         Thought Content: Thought content normal.         Assessment:   Rectal adenocarcinoma  --Original tumor stage:  TNM III (cT3, cN1, M0). MSI by PCR: MSI-stable (LORENZO)  --Original tumor burden:  --11/9/23- Colonoscopy:  Rectal mass.  Malignant partially obstructing tumor from 4 to 11 cm proximal to the anus.  Biopsied.  Exam otherwise normal on direct and retroflexion views.  Final diagnosis:Large intestine, designated \"rectal mass\".  Invasive adenocarcinoma, well to moderately differentiated.  --12/1/23- CT CAP- No mets in chest.  Thickening of the wall of the rectum consistent with the history of rectal neoplasm. The outer wall of the rectum is somewhat ill-defined. Therefore, there may be some extension through the wall. In addition, the fat plane between the rectum and prostate is obscured suggesting possible extension of tumor into this area. There is a 5 mm lymph node in the left perirectal fat image 71 series 3. There is an additional small lymph node more superiorly in the pelvic mesenteric fat measuring 6-7 mm in short axis diameter. No other evidence of mets.  --12/5/23- flexible sigmoidoscopy with rectal EUS, Dr. Calderon Cano  Known rectal mass was 80% circumferential and invasive appearing with ulceration extending from 4 cm to 14 cm  Endosonography revealed an irregular appearing heterogeneous mass measuring over 4.4 cm in greatest dimension with evidence of invasion through and " "obliteration of the muscularis propria.  Irregular borders were noted with questionable spread through the rectal wall.  The lesion did not appear to invade the prostate.  No definite lymphadenopathy was appreciated.    --Complications of tumor:  --Rectal bleeding, anemia  --Tumor status:  -- Neoadjuvant FOLFOX in progress    2.   Anemia. Iron deficiency from rectal bleeding/malignancy  --11/20/23- fe 46 (L), fe sat 13% (L), ferritin 24 (L)  --Venofer x 4-12/4-12/27/23  --Hgb 13.1; MCV 94.5, 1/3/24 (prior linn:  Hgb 11.4)  3.   Diabetes on insulin  4.   Arthritis  5.   BPH  6.   SARAHI  7.   Advanced age  8.   Borderline PS (ECOG 2)    Plan:  Apprised of labs, 1/3/24 with improved anemia otherwise normal CBC, glucose 236 otherwise essentially normal CMP,  repleted B12/folate, but depressed iron levels and elevated CEA (15). Received Venofer x 4  Note MMR 1 tomorrow could not be done due to lack of material.  While MSI by PCR: MSI-stable (LORENZO)  Chemo tolerance.  Grade 1 diarrhea/neuropathy- needs diarrhea teaching sheets  Consult, 12/6/23- Dr. Su of rad onc-, I agree with total neoadjuvant therapy with chemo planned to be delivered under Dr. Craft's care, subsequent concurrent chemo-radiation to the pelvis, I anticipate a dose of 5040 cGy over 28 fractions with referral to surgeon post completion of therapy.   Consult, 12/5/23- Dr. Calderon Cano of GI. Flex sig (above).  No definite adenopathy  Review CT scans of the chest, abdomen/pelvis, 12/1/23 (above).  \"There may be some extension through the wall. In addition, the fat plane between the rectum and prostate is obscured suggesting possible extension of tumor into this area. There is a 5 mm lymph node in the left perirectal fat and small lymph node more superiorly in the pelvic mesenteric fat measuring 6-7 mm in short axis diameter...no other mets.\"     7.   Review NCCN guidelines 2.2023 rectal cancer-for clinical stage T4, N any - pMMR/LORENZO- primary treatment " - total neoadjuvant therapy.  FOLFOX or [place] ox- 12-16 weeks)-long course chemo/RT with capecitabine or infusional 7-CA-ojefinywb (best tumor response 8 weeks after completion of RT)- reassess for surgery (Transabdominal resection or of CR consider observation.  If resection contraindciated-systemic therapy; If dMMR/MSI-H- primary treatment- NEOADJUVANT/DEFINITIVE IMMUNOTHERAPY (PREFERRED)- IO therapy for up to 6 months (nivolumab or pembrolizumab); OR Total neoadjuvant therapy (as above)     8.   Discuss with patient: FOLFOX (5-FU and oxaliplatin) chemotherapy (to include but not limited to: Myelosuppression (leukopenia, anemia, thrombocytopenia), agranulocytosis, hemorrhage, GI bleed, stomatitis, coronary vasospasm, myocardial ischemia, anaphylaxis, nausea, vomiting, diarrhea, anorexia, hand-foot syndrome, alopecia, dermatitis, photosensitivity, skin ulcers, acute cerebellar syndrome, headache, confusion, ocular irritation, hyperlacrimation, nail discoloration; oxaliplatin: Hypersensitivity reactions, anaphylaxis, laryngospasm, thromboembolism, myelosuppression (pancytopenia), hemolytic uremic syndrome, pulmonary fibrosis, interstitial lung disease, hepatotoxicity, pancreatitis, ileus, GI bleed, nausea/vomiting, severe diarrhea, metabolic acidosis, acute renal failure, reversible posterior leukoencephalopathy syndrome, peripheral neuropathy, optic neuritis, deafness, seizures, stomatitis, liver enzyme abnormalities, anorexia, cough, constipation, belly pain, fever, infection, dyspnea, epistaxis, edema, taste changes, myalgias, hyperglycemia, headache, insomnia, dyspepsia, back pain, injection site reaction, weight loss, peripheral edema, pharyngolaryngeal dysesthesia).  Questions answered.  He wishes to press on     9.   Schedule treatment - C3, 1/24/24; C4, 2/7/24; C5, 2/21/24; C6, 3/6/24 at [facility]:  To be administered every 2 weeks x 6 cycles- due to age, comorbidities and PS will dose reduce 20% up front from C1      BSA =    D1:   Oxaliplatin 85 mg/m2 IVPB over 2 hours (TD =  mg).   Precede and follow Oxaliplatin with: Ca Gluconate 2 gm and MgSO4 2 gm in 250 mL D5W IVPB over 30 min.   Leucovorin 400 mg/m2 IVPB over 2 hours (TD= mg).   5- mg/ m2 IVP (TD =  mg).   Begin 5-FU 2400? mg/m2 IVPB over 46 hours. (TD=  mg).                --Premedicate with:   Aloxi 0.25 mg IV   Emend 130 mg IV   Decadron 10 mg IV      10.  Upon initiation of chemo:  CMP and CBC weekly, with Procrit 40,000 if hemoglobin less than 10 and hematocrit less than 30 and Zarxio/Neupogen 480 mcg subcutaneously x5 days if ANC less than 1.0 at [facility]     11. Rx:   Zofran 8 mg p.o. 3 times daily #30   Imodium 2 mg po as directed       12.  Refer to Dr. Gali Kelly - colorectal surgery at [place] -2nd request  13.  Return to office in 7 weeks with preoffice CBC, diff and CMP    I spent 49 minutes caring for Juliocesar on this date of service. This time includes time spent by me in the following activities: preparing for the visit, reviewing tests, performing a medically appropriate examination and/or evaluation, counseling and educating the patient/family/caregiver, ordering medications, tests, or procedures and documenting information in the medical record.

## 2024-01-17 ENCOUNTER — TELEPHONE (OUTPATIENT)
Dept: ONCOLOGY | Facility: CLINIC | Age: 89
End: 2024-01-17
Payer: MEDICARE

## 2024-01-17 ENCOUNTER — LAB (OUTPATIENT)
Dept: LAB | Facility: HOSPITAL | Age: 89
End: 2024-01-17
Payer: MEDICARE

## 2024-01-17 ENCOUNTER — INFUSION (OUTPATIENT)
Dept: ONCOLOGY | Facility: HOSPITAL | Age: 89
End: 2024-01-17
Payer: MEDICARE

## 2024-01-17 VITALS
BODY MASS INDEX: 26.2 KG/M2 | TEMPERATURE: 97.6 F | OXYGEN SATURATION: 93 % | WEIGHT: 183 LBS | SYSTOLIC BLOOD PRESSURE: 134 MMHG | HEIGHT: 70 IN | HEART RATE: 82 BPM | RESPIRATION RATE: 18 BRPM | DIASTOLIC BLOOD PRESSURE: 54 MMHG

## 2024-01-17 DIAGNOSIS — C20 RECTAL ADENOCARCINOMA: Primary | ICD-10-CM

## 2024-01-17 DIAGNOSIS — C20 RECTAL ADENOCARCINOMA: ICD-10-CM

## 2024-01-17 LAB
ALBUMIN SERPL-MCNC: 3.4 G/DL (ref 3.5–5.2)
ALBUMIN/GLOB SERPL: 1.2 G/DL
ALP SERPL-CCNC: 99 U/L (ref 39–117)
ALT SERPL W P-5'-P-CCNC: 21 U/L (ref 1–41)
ANION GAP SERPL CALCULATED.3IONS-SCNC: 7 MMOL/L (ref 5–15)
AST SERPL-CCNC: 23 U/L (ref 1–40)
BASOPHILS # BLD AUTO: 0.01 10*3/MM3 (ref 0–0.2)
BASOPHILS NFR BLD AUTO: 0.2 % (ref 0–1.5)
BILIRUB SERPL-MCNC: 0.4 MG/DL (ref 0–1.2)
BUN SERPL-MCNC: 14 MG/DL (ref 8–23)
BUN/CREAT SERPL: 17.3 (ref 7–25)
CALCIUM SPEC-SCNC: 8.6 MG/DL (ref 8.2–9.6)
CHLORIDE SERPL-SCNC: 105 MMOL/L (ref 98–107)
CO2 SERPL-SCNC: 31 MMOL/L (ref 22–29)
CREAT SERPL-MCNC: 0.81 MG/DL (ref 0.76–1.27)
DEPRECATED RDW RBC AUTO: 51.2 FL (ref 37–54)
EGFRCR SERPLBLD CKD-EPI 2021: 83.2 ML/MIN/1.73
EOSINOPHIL # BLD AUTO: 0.13 10*3/MM3 (ref 0–0.4)
EOSINOPHIL NFR BLD AUTO: 3.1 % (ref 0.3–6.2)
ERYTHROCYTE [DISTWIDTH] IN BLOOD BY AUTOMATED COUNT: 14.9 % (ref 12.3–15.4)
GLOBULIN UR ELPH-MCNC: 2.9 GM/DL
GLUCOSE SERPL-MCNC: 193 MG/DL (ref 65–99)
HCT VFR BLD AUTO: 39.1 % (ref 37.5–51)
HGB BLD-MCNC: 12.6 G/DL (ref 13–17.7)
IMM GRANULOCYTES # BLD AUTO: 0.01 10*3/MM3 (ref 0–0.05)
IMM GRANULOCYTES NFR BLD AUTO: 0.2 % (ref 0–0.5)
LYMPHOCYTES # BLD AUTO: 1.88 10*3/MM3 (ref 0.7–3.1)
LYMPHOCYTES NFR BLD AUTO: 44.2 % (ref 19.6–45.3)
MCH RBC QN AUTO: 30.4 PG (ref 26.6–33)
MCHC RBC AUTO-ENTMCNC: 32.2 G/DL (ref 31.5–35.7)
MCV RBC AUTO: 94.2 FL (ref 79–97)
MONOCYTES # BLD AUTO: 0.44 10*3/MM3 (ref 0.1–0.9)
MONOCYTES NFR BLD AUTO: 10.4 % (ref 5–12)
NEUTROPHILS NFR BLD AUTO: 1.78 10*3/MM3 (ref 1.7–7)
NEUTROPHILS NFR BLD AUTO: 41.9 % (ref 42.7–76)
NRBC BLD AUTO-RTO: 0 /100 WBC (ref 0–0.2)
PLATELET # BLD AUTO: 99 10*3/MM3 (ref 140–450)
PMV BLD AUTO: 9.1 FL (ref 6–12)
POTASSIUM SERPL-SCNC: 3.9 MMOL/L (ref 3.5–5.2)
PROT SERPL-MCNC: 6.3 G/DL (ref 6–8.5)
RBC # BLD AUTO: 4.15 10*6/MM3 (ref 4.14–5.8)
SODIUM SERPL-SCNC: 143 MMOL/L (ref 136–145)
WBC NRBC COR # BLD AUTO: 4.25 10*3/MM3 (ref 3.4–10.8)

## 2024-01-17 PROCEDURE — 80053 COMPREHEN METABOLIC PANEL: CPT

## 2024-01-17 PROCEDURE — 85025 COMPLETE CBC W/AUTO DIFF WBC: CPT

## 2024-01-17 PROCEDURE — 36415 COLL VENOUS BLD VENIPUNCTURE: CPT

## 2024-01-17 PROCEDURE — G0463 HOSPITAL OUTPT CLINIC VISIT: HCPCS

## 2024-01-17 NOTE — PROGRESS NOTES
Patient here for Folfox treatment. Reported low Platelets 99 to Dr Abraham's office.   Per Pau: hold Juliocesar Tellez this week and princess for ntxt week please.

## 2024-01-22 ENCOUNTER — OFFICE VISIT (OUTPATIENT)
Dept: ONCOLOGY | Facility: CLINIC | Age: 89
End: 2024-01-22
Payer: MEDICARE

## 2024-01-22 VITALS
TEMPERATURE: 98.8 F | WEIGHT: 183 LBS | OXYGEN SATURATION: 96 % | BODY MASS INDEX: 26.2 KG/M2 | HEIGHT: 70 IN | DIASTOLIC BLOOD PRESSURE: 60 MMHG | RESPIRATION RATE: 18 BRPM | SYSTOLIC BLOOD PRESSURE: 138 MMHG | HEART RATE: 91 BPM

## 2024-01-22 DIAGNOSIS — C20 RECTAL ADENOCARCINOMA: Primary | ICD-10-CM

## 2024-01-22 PROCEDURE — 1126F AMNT PAIN NOTED NONE PRSNT: CPT | Performed by: INTERNAL MEDICINE

## 2024-01-22 PROCEDURE — 99215 OFFICE O/P EST HI 40 MIN: CPT | Performed by: INTERNAL MEDICINE

## 2024-01-22 PROCEDURE — 1159F MED LIST DOCD IN RCRD: CPT | Performed by: INTERNAL MEDICINE

## 2024-01-24 ENCOUNTER — INFUSION (OUTPATIENT)
Dept: ONCOLOGY | Facility: HOSPITAL | Age: 89
End: 2024-01-24
Payer: MEDICARE

## 2024-01-24 ENCOUNTER — LAB (OUTPATIENT)
Dept: LAB | Facility: HOSPITAL | Age: 89
End: 2024-01-24
Payer: MEDICARE

## 2024-01-24 ENCOUNTER — TELEPHONE (OUTPATIENT)
Dept: ONCOLOGY | Facility: CLINIC | Age: 89
End: 2024-01-24
Payer: MEDICARE

## 2024-01-24 VITALS
HEART RATE: 79 BPM | SYSTOLIC BLOOD PRESSURE: 114 MMHG | OXYGEN SATURATION: 93 % | RESPIRATION RATE: 18 BRPM | BODY MASS INDEX: 26.54 KG/M2 | TEMPERATURE: 97.5 F | WEIGHT: 185.4 LBS | DIASTOLIC BLOOD PRESSURE: 80 MMHG | HEIGHT: 70 IN

## 2024-01-24 DIAGNOSIS — C20 RECTAL ADENOCARCINOMA: ICD-10-CM

## 2024-01-24 DIAGNOSIS — C20 RECTAL ADENOCARCINOMA: Primary | ICD-10-CM

## 2024-01-24 LAB
ALBUMIN SERPL-MCNC: 3.5 G/DL (ref 3.5–5.2)
ALBUMIN/GLOB SERPL: 1.1 G/DL
ALP SERPL-CCNC: 107 U/L (ref 39–117)
ALT SERPL W P-5'-P-CCNC: 20 U/L (ref 1–41)
ANION GAP SERPL CALCULATED.3IONS-SCNC: 8 MMOL/L (ref 5–15)
AST SERPL-CCNC: 20 U/L (ref 1–40)
BASOPHILS # BLD AUTO: 0.05 10*3/MM3 (ref 0–0.2)
BASOPHILS NFR BLD AUTO: 1 % (ref 0–1.5)
BILIRUB SERPL-MCNC: 0.3 MG/DL (ref 0–1.2)
BUN SERPL-MCNC: 12 MG/DL (ref 8–23)
BUN/CREAT SERPL: 13.5 (ref 7–25)
CALCIUM SPEC-SCNC: 8.8 MG/DL (ref 8.2–9.6)
CHLORIDE SERPL-SCNC: 104 MMOL/L (ref 98–107)
CO2 SERPL-SCNC: 32 MMOL/L (ref 22–29)
CREAT SERPL-MCNC: 0.89 MG/DL (ref 0.76–1.27)
DEPRECATED RDW RBC AUTO: 53.8 FL (ref 37–54)
EGFRCR SERPLBLD CKD-EPI 2021: 80.9 ML/MIN/1.73
EOSINOPHIL # BLD AUTO: 0.19 10*3/MM3 (ref 0–0.4)
EOSINOPHIL NFR BLD AUTO: 3.7 % (ref 0.3–6.2)
ERYTHROCYTE [DISTWIDTH] IN BLOOD BY AUTOMATED COUNT: 15.5 % (ref 12.3–15.4)
GLOBULIN UR ELPH-MCNC: 3.1 GM/DL
GLUCOSE BLDC GLUCOMTR-MCNC: 196 MG/DL (ref 70–130)
GLUCOSE SERPL-MCNC: 259 MG/DL (ref 65–99)
HCT VFR BLD AUTO: 40.7 % (ref 37.5–51)
HGB BLD-MCNC: 12.9 G/DL (ref 13–17.7)
IMM GRANULOCYTES # BLD AUTO: 0.07 10*3/MM3 (ref 0–0.05)
IMM GRANULOCYTES NFR BLD AUTO: 1.3 % (ref 0–0.5)
LYMPHOCYTES # BLD AUTO: 2.12 10*3/MM3 (ref 0.7–3.1)
LYMPHOCYTES NFR BLD AUTO: 40.8 % (ref 19.6–45.3)
MCH RBC QN AUTO: 29.9 PG (ref 26.6–33)
MCHC RBC AUTO-ENTMCNC: 31.7 G/DL (ref 31.5–35.7)
MCV RBC AUTO: 94.2 FL (ref 79–97)
MONOCYTES # BLD AUTO: 0.83 10*3/MM3 (ref 0.1–0.9)
MONOCYTES NFR BLD AUTO: 16 % (ref 5–12)
NEUTROPHILS NFR BLD AUTO: 1.93 10*3/MM3 (ref 1.7–7)
NEUTROPHILS NFR BLD AUTO: 37.2 % (ref 42.7–76)
NRBC BLD AUTO-RTO: 0 /100 WBC (ref 0–0.2)
PLATELET # BLD AUTO: 182 10*3/MM3 (ref 140–450)
PMV BLD AUTO: 9 FL (ref 6–12)
POTASSIUM SERPL-SCNC: 4.5 MMOL/L (ref 3.5–5.2)
PROT SERPL-MCNC: 6.6 G/DL (ref 6–8.5)
RBC # BLD AUTO: 4.32 10*6/MM3 (ref 4.14–5.8)
SODIUM SERPL-SCNC: 144 MMOL/L (ref 136–145)
WBC NRBC COR # BLD AUTO: 5.19 10*3/MM3 (ref 3.4–10.8)

## 2024-01-24 PROCEDURE — 0 DEXTROSE 5 % SOLUTION: Performed by: INTERNAL MEDICINE

## 2024-01-24 PROCEDURE — 25010000002 OXALIPLATIN PER 0.5 MG: Performed by: INTERNAL MEDICINE

## 2024-01-24 PROCEDURE — 96368 THER/DIAG CONCURRENT INF: CPT

## 2024-01-24 PROCEDURE — 25010000002 FLUOROURACIL PER 500 MG: Performed by: INTERNAL MEDICINE

## 2024-01-24 PROCEDURE — 96375 TX/PRO/DX INJ NEW DRUG ADDON: CPT

## 2024-01-24 PROCEDURE — 25010000002 LEUCOVORIN CALCIUM PER 50 MG: Performed by: INTERNAL MEDICINE

## 2024-01-24 PROCEDURE — 96411 CHEMO IV PUSH ADDL DRUG: CPT

## 2024-01-24 PROCEDURE — 25010000002 FOSAPREPITANT PER 1 MG: Performed by: INTERNAL MEDICINE

## 2024-01-24 PROCEDURE — G0498 CHEMO EXTEND IV INFUS W/PUMP: HCPCS

## 2024-01-24 PROCEDURE — 96415 CHEMO IV INFUSION ADDL HR: CPT

## 2024-01-24 PROCEDURE — 0 DEXTROSE 5 % SOLUTION 250 ML FLEX CONT: Performed by: INTERNAL MEDICINE

## 2024-01-24 PROCEDURE — 96367 TX/PROPH/DG ADDL SEQ IV INF: CPT

## 2024-01-24 PROCEDURE — 25010000002 DEXAMETHASONE SODIUM PHOSPHATE 100 MG/10ML SOLUTION: Performed by: INTERNAL MEDICINE

## 2024-01-24 PROCEDURE — 85025 COMPLETE CBC W/AUTO DIFF WBC: CPT

## 2024-01-24 PROCEDURE — 96413 CHEMO IV INFUSION 1 HR: CPT

## 2024-01-24 PROCEDURE — 82948 REAGENT STRIP/BLOOD GLUCOSE: CPT | Performed by: FAMILY MEDICINE

## 2024-01-24 PROCEDURE — 25010000002 PALONOSETRON PER 25 MCG: Performed by: INTERNAL MEDICINE

## 2024-01-24 PROCEDURE — 80053 COMPREHEN METABOLIC PANEL: CPT

## 2024-01-24 PROCEDURE — 96366 THER/PROPH/DIAG IV INF ADDON: CPT

## 2024-01-24 PROCEDURE — 36415 COLL VENOUS BLD VENIPUNCTURE: CPT

## 2024-01-24 RX ORDER — DEXTROSE MONOHYDRATE 50 MG/ML
20 INJECTION, SOLUTION INTRAVENOUS ONCE
Status: COMPLETED | OUTPATIENT
Start: 2024-01-24 | End: 2024-01-24

## 2024-01-24 RX ORDER — FAMOTIDINE 10 MG/ML
20 INJECTION, SOLUTION INTRAVENOUS AS NEEDED
Status: DISCONTINUED | OUTPATIENT
Start: 2024-01-24 | End: 2024-01-24 | Stop reason: HOSPADM

## 2024-01-24 RX ORDER — PALONOSETRON 0.05 MG/ML
0.25 INJECTION, SOLUTION INTRAVENOUS ONCE
Status: COMPLETED | OUTPATIENT
Start: 2024-01-24 | End: 2024-01-24

## 2024-01-24 RX ORDER — FLUOROURACIL 50 MG/ML
320 INJECTION, SOLUTION INTRAVENOUS ONCE
Status: COMPLETED | OUTPATIENT
Start: 2024-01-24 | End: 2024-01-24

## 2024-01-24 RX ORDER — DIPHENHYDRAMINE HYDROCHLORIDE 50 MG/ML
50 INJECTION INTRAMUSCULAR; INTRAVENOUS AS NEEDED
Status: DISCONTINUED | OUTPATIENT
Start: 2024-01-24 | End: 2024-01-24 | Stop reason: HOSPADM

## 2024-01-24 RX ADMIN — OXALIPLATIN 135 MG: 5 INJECTION, SOLUTION INTRAVENOUS at 10:13

## 2024-01-24 RX ADMIN — DEXAMETHASONE SODIUM PHOSPHATE 12 MG: 10 INJECTION, SOLUTION INTRAMUSCULAR; INTRAVENOUS at 09:14

## 2024-01-24 RX ADMIN — FLUOROURACIL 3840 MG: 50 INJECTION, SOLUTION INTRAVENOUS at 12:42

## 2024-01-24 RX ADMIN — LEUCOVORIN CALCIUM 640 MG: 350 INJECTION, POWDER, LYOPHILIZED, FOR SUSPENSION INTRAMUSCULAR; INTRAVENOUS at 10:14

## 2024-01-24 RX ADMIN — FLUOROURACIL 640 MG: 50 INJECTION, SOLUTION INTRAVENOUS at 12:41

## 2024-01-24 RX ADMIN — DEXTROSE MONOHYDRATE 20 ML/HR: 50 INJECTION, SOLUTION INTRAVENOUS at 10:11

## 2024-01-24 RX ADMIN — PALONOSETRON HYDROCHLORIDE 0.25 MG: 0.25 INJECTION, SOLUTION INTRAVENOUS at 09:11

## 2024-01-24 RX ADMIN — FOSAPREPITANT 150 MG: 150 INJECTION, POWDER, LYOPHILIZED, FOR SOLUTION INTRAVENOUS at 09:37

## 2024-01-24 NOTE — PROGRESS NOTES
0840 Contacted Gustavo with MD office r/t blood glucose 259, finger stick a little while later 196. Per MD ok for treatment today. B Lazaro GUADALUPE

## 2024-01-24 NOTE — TELEPHONE ENCOUNTER
----- Message from Damian Abraham MD sent at 1/24/2024  7:49 AM CST -----  Forward these labs to pcp re;  glucose>250  ----- Message -----  From: Lab, Background User  Sent: 1/24/2024   7:28 AM CST  To: Damian Abraham MD

## 2024-01-26 ENCOUNTER — INFUSION (OUTPATIENT)
Dept: ONCOLOGY | Facility: HOSPITAL | Age: 89
End: 2024-01-26
Payer: MEDICARE

## 2024-01-26 VITALS
RESPIRATION RATE: 20 BRPM | HEART RATE: 63 BPM | TEMPERATURE: 97.2 F | HEIGHT: 70 IN | BODY MASS INDEX: 27.54 KG/M2 | WEIGHT: 192.4 LBS | SYSTOLIC BLOOD PRESSURE: 127 MMHG | OXYGEN SATURATION: 93 % | DIASTOLIC BLOOD PRESSURE: 53 MMHG

## 2024-01-26 DIAGNOSIS — Z45.2 ENCOUNTER FOR CARE RELATED TO VASCULAR ACCESS PORT: ICD-10-CM

## 2024-01-26 DIAGNOSIS — C20 RECTAL ADENOCARCINOMA: Primary | ICD-10-CM

## 2024-01-26 PROCEDURE — G0463 HOSPITAL OUTPT CLINIC VISIT: HCPCS

## 2024-01-26 PROCEDURE — 25010000002 HEPARIN LOCK FLUSH PER 10 UNITS: Performed by: INTERNAL MEDICINE

## 2024-01-26 RX ORDER — HEPARIN SODIUM (PORCINE) LOCK FLUSH IV SOLN 100 UNIT/ML 100 UNIT/ML
500 SOLUTION INTRAVENOUS AS NEEDED
OUTPATIENT
Start: 2024-01-26

## 2024-01-26 RX ORDER — HEPARIN SODIUM (PORCINE) LOCK FLUSH IV SOLN 100 UNIT/ML 100 UNIT/ML
500 SOLUTION INTRAVENOUS AS NEEDED
Status: DISCONTINUED | OUTPATIENT
Start: 2024-01-26 | End: 2024-01-26 | Stop reason: HOSPADM

## 2024-01-26 RX ORDER — SODIUM CHLORIDE 0.9 % (FLUSH) 0.9 %
10 SYRINGE (ML) INJECTION AS NEEDED
OUTPATIENT
Start: 2024-01-26

## 2024-01-26 RX ORDER — SODIUM CHLORIDE 0.9 % (FLUSH) 0.9 %
10 SYRINGE (ML) INJECTION AS NEEDED
Status: DISCONTINUED | OUTPATIENT
Start: 2024-01-26 | End: 2024-01-26 | Stop reason: HOSPADM

## 2024-01-26 RX ADMIN — Medication 500 UNITS: at 10:59

## 2024-01-26 RX ADMIN — Medication 10 ML: at 10:59

## 2024-01-26 NOTE — PROGRESS NOTES
Message sent to office: pt was here for pump off. Pt states BG was around 330 the other day and PCP instructed him to increase insulin that day. Pt concerned chemo is worsening his DM, I told him I would let Dr. WOOD know and that pt should talk to his PCP.  when he checked it here. Was 185lbs 1/24 now 192lbs but had pump on. Has chronic +2 edema in bilateral feet/ankles that hasn't changed per pt. Thanks

## 2024-01-29 ENCOUNTER — TELEPHONE (OUTPATIENT)
Dept: ONCOLOGY | Facility: CLINIC | Age: 89
End: 2024-01-29
Payer: MEDICARE

## 2024-01-29 ENCOUNTER — TELEPHONE (OUTPATIENT)
Dept: ONCOLOGY | Facility: CLINIC | Age: 89
End: 2024-01-29

## 2024-01-29 NOTE — TELEPHONE ENCOUNTER
Caller: debbie capone    Relationship: Emergency Contact    Best call back number: [phone]    Who are you requesting to speak with (clinical staff, provider,  specific staff member): GEORGETTE      What was the call regarding: PT SPOUSE CALLED WANTED TO SPEAK TO GEORGETTE REGARDING SCHEDULING PATIENT IN [place]

## 2024-01-30 NOTE — TELEPHONE ENCOUNTER
Caller: debbie capone    Relationship to patient: Emergency Contact    Best call back number: [phone]     Chief complaint: PATIENT CALLING TO RESCHEDULE APPT FOR [facility]    Type of visit: REFERRAL TO OUTSIDE OFFICE    Requested date: CALLER DOES NOT WANT TO INTERFERE WITH SCHEDULED APPTS AT OFFICE OF MD [facility]  
How Severe Is It?: severe
I have called and left a message for patient's wife.   
Patient's wife called me back and confirmed that we're holding treatment next week and for pt to still go to [facility] on the 7th. She asked will he just start back on 2/21/24? that is next scheduled treatment.  
Spoke with Ronel and let her know that Aida ayers has been moved to 02/14/2024 at 8 am. Ronel verbalized understanding.   
Is This A New Presentation, Or A Follow-Up?: Follow Up Accutane

## 2024-01-31 ENCOUNTER — TELEPHONE (OUTPATIENT)
Dept: ONCOLOGY | Facility: CLINIC | Age: 89
End: 2024-01-31
Payer: MEDICARE

## 2024-01-31 DIAGNOSIS — C20 RECTAL ADENOCARCINOMA: Primary | ICD-10-CM

## 2024-01-31 RX ORDER — FLUOROURACIL 50 MG/ML
320 INJECTION, SOLUTION INTRAVENOUS ONCE
OUTPATIENT
Start: 2024-02-14

## 2024-01-31 RX ORDER — DEXTROSE MONOHYDRATE 50 MG/ML
20 INJECTION, SOLUTION INTRAVENOUS ONCE
OUTPATIENT
Start: 2024-02-14

## 2024-01-31 RX ORDER — FAMOTIDINE 10 MG/ML
20 INJECTION, SOLUTION INTRAVENOUS AS NEEDED
OUTPATIENT
Start: 2024-02-14

## 2024-01-31 RX ORDER — DIPHENHYDRAMINE HYDROCHLORIDE 50 MG/ML
50 INJECTION INTRAMUSCULAR; INTRAVENOUS AS NEEDED
OUTPATIENT
Start: 2024-02-14

## 2024-01-31 RX ORDER — PALONOSETRON 0.05 MG/ML
0.25 INJECTION, SOLUTION INTRAVENOUS ONCE
OUTPATIENT
Start: 2024-02-14

## 2024-01-31 NOTE — TELEPHONE ENCOUNTER
Caller: Jocelyne Brandt    Relationship: Emergency Contact    Best call back number: [phone]    What is the best time to reach you: ANYTIME    Who are you requesting to speak with (clinical staff, provider,  specific staff member): CLINICAL     Do you know the name of the person who called: JOCELYNE     What was the call regarding: BRIDGETTE CALLING WOULD LIKE DETAILS OF WHY PATIENT IS GOING TO [place], [facility] IS SCHEDULED FOR [facility]/7/2024. HE WILL BE DRIVING THE PATIENT, JOCELYNE IS ON THE  VERBAL FOR THE DEPT.    CALL TO DISCUSS     Is it okay if the provider responds through MyChart:

## 2024-01-31 NOTE — TELEPHONE ENCOUNTER
"Returned call to patient's nephew, Kd Brandt at [phone] as requested.  Guero has kindly offered to drive the patient and his wife to his appointment at [place] but wanted to know why the patient had to travel to [place] in the first place.  I carefully explained to Guero that the patient will need to be seen by surgical specialists who perform the type of surgery the patient will need because unfortunately we do not have that expertise available locally.  We then discussed the rationale for total neoadjuvant therapy (chemotherapy followed by chemotherapy and radiation) prior to surgery.  Thus, the visit to [place] will be essentially to be seen and evaluated by the colorectal surgical service there in anticipation of ultimately having surgery after completion of neoadjuvant treatments locally. Guero voices understanding and agreement.  He also expressed gratitude, \"thank you for the explanation and phone call.  They just wanted to be sure of the reason they were going down there.\"  I reassured Guero that we have also had long conversations with the patient and his spouse regarding the reason for all this process.  "

## 2024-02-14 ENCOUNTER — LAB (OUTPATIENT)
Dept: LAB | Facility: HOSPITAL | Age: 89
End: 2024-02-14
Payer: MEDICARE

## 2024-02-14 ENCOUNTER — INFUSION (OUTPATIENT)
Dept: ONCOLOGY | Facility: HOSPITAL | Age: 89
End: 2024-02-14
Payer: MEDICARE

## 2024-02-14 VITALS
OXYGEN SATURATION: 94 % | HEART RATE: 77 BPM | WEIGHT: 185 LBS | BODY MASS INDEX: 26.48 KG/M2 | SYSTOLIC BLOOD PRESSURE: 140 MMHG | TEMPERATURE: 97.6 F | DIASTOLIC BLOOD PRESSURE: 44 MMHG | HEIGHT: 70 IN | RESPIRATION RATE: 16 BRPM

## 2024-02-14 DIAGNOSIS — C20 RECTAL ADENOCARCINOMA: Primary | ICD-10-CM

## 2024-02-14 DIAGNOSIS — C20 RECTAL ADENOCARCINOMA: ICD-10-CM

## 2024-02-14 LAB
ALBUMIN SERPL-MCNC: 3.3 G/DL (ref 3.5–5.2)
ALBUMIN/GLOB SERPL: 1.1 G/DL
ALP SERPL-CCNC: 96 U/L (ref 39–117)
ALT SERPL W P-5'-P-CCNC: 28 U/L (ref 1–41)
ANION GAP SERPL CALCULATED.3IONS-SCNC: 7 MMOL/L (ref 5–15)
AST SERPL-CCNC: 31 U/L (ref 1–40)
BASOPHILS # BLD AUTO: 0.03 10*3/MM3 (ref 0–0.2)
BASOPHILS NFR BLD AUTO: 0.5 % (ref 0–1.5)
BILIRUB SERPL-MCNC: 0.3 MG/DL (ref 0–1.2)
BUN SERPL-MCNC: 11 MG/DL (ref 8–23)
BUN/CREAT SERPL: 14.3 (ref 7–25)
CALCIUM SPEC-SCNC: 8.3 MG/DL (ref 8.2–9.6)
CHLORIDE SERPL-SCNC: 105 MMOL/L (ref 98–107)
CO2 SERPL-SCNC: 32 MMOL/L (ref 22–29)
CREAT SERPL-MCNC: 0.77 MG/DL (ref 0.76–1.27)
DEPRECATED RDW RBC AUTO: 54.9 FL (ref 37–54)
EGFRCR SERPLBLD CKD-EPI 2021: 84.5 ML/MIN/1.73
EOSINOPHIL # BLD AUTO: 0.34 10*3/MM3 (ref 0–0.4)
EOSINOPHIL NFR BLD AUTO: 6.2 % (ref 0.3–6.2)
ERYTHROCYTE [DISTWIDTH] IN BLOOD BY AUTOMATED COUNT: 15.9 % (ref 12.3–15.4)
GLOBULIN UR ELPH-MCNC: 3 GM/DL
GLUCOSE SERPL-MCNC: 192 MG/DL (ref 65–99)
HCT VFR BLD AUTO: 41.2 % (ref 37.5–51)
HGB BLD-MCNC: 13.3 G/DL (ref 13–17.7)
IMM GRANULOCYTES # BLD AUTO: 0.05 10*3/MM3 (ref 0–0.05)
IMM GRANULOCYTES NFR BLD AUTO: 0.9 % (ref 0–0.5)
LYMPHOCYTES # BLD AUTO: 2.63 10*3/MM3 (ref 0.7–3.1)
LYMPHOCYTES NFR BLD AUTO: 47.7 % (ref 19.6–45.3)
MCH RBC QN AUTO: 30.3 PG (ref 26.6–33)
MCHC RBC AUTO-ENTMCNC: 32.3 G/DL (ref 31.5–35.7)
MCV RBC AUTO: 93.8 FL (ref 79–97)
MONOCYTES # BLD AUTO: 1 10*3/MM3 (ref 0.1–0.9)
MONOCYTES NFR BLD AUTO: 18.1 % (ref 5–12)
NEUTROPHILS NFR BLD AUTO: 1.46 10*3/MM3 (ref 1.7–7)
NEUTROPHILS NFR BLD AUTO: 26.6 % (ref 42.7–76)
NRBC BLD AUTO-RTO: 0 /100 WBC (ref 0–0.2)
PLATELET # BLD AUTO: 186 10*3/MM3 (ref 140–450)
PMV BLD AUTO: 8.8 FL (ref 6–12)
POTASSIUM SERPL-SCNC: 4.2 MMOL/L (ref 3.5–5.2)
PROT SERPL-MCNC: 6.3 G/DL (ref 6–8.5)
RBC # BLD AUTO: 4.39 10*6/MM3 (ref 4.14–5.8)
SODIUM SERPL-SCNC: 144 MMOL/L (ref 136–145)
WBC NRBC COR # BLD AUTO: 5.51 10*3/MM3 (ref 3.4–10.8)

## 2024-02-14 PROCEDURE — 85025 COMPLETE CBC W/AUTO DIFF WBC: CPT

## 2024-02-14 PROCEDURE — G0463 HOSPITAL OUTPT CLINIC VISIT: HCPCS

## 2024-02-14 PROCEDURE — 80053 COMPREHEN METABOLIC PANEL: CPT

## 2024-02-14 PROCEDURE — 36415 COLL VENOUS BLD VENIPUNCTURE: CPT

## 2024-02-16 ENCOUNTER — TELEPHONE (OUTPATIENT)
Dept: ONCOLOGY | Facility: CLINIC | Age: 89
End: 2024-02-16
Payer: MEDICARE

## 2024-02-21 ENCOUNTER — INFUSION (OUTPATIENT)
Dept: ONCOLOGY | Facility: HOSPITAL | Age: 89
End: 2024-02-21
Payer: MEDICARE

## 2024-02-21 ENCOUNTER — LAB (OUTPATIENT)
Dept: LAB | Facility: HOSPITAL | Age: 89
End: 2024-02-21
Payer: MEDICARE

## 2024-02-21 ENCOUNTER — TELEPHONE (OUTPATIENT)
Dept: ONCOLOGY | Facility: CLINIC | Age: 89
End: 2024-02-21
Payer: MEDICARE

## 2024-02-21 VITALS
SYSTOLIC BLOOD PRESSURE: 160 MMHG | RESPIRATION RATE: 18 BRPM | DIASTOLIC BLOOD PRESSURE: 57 MMHG | OXYGEN SATURATION: 94 % | HEIGHT: 70 IN | BODY MASS INDEX: 26.05 KG/M2 | WEIGHT: 182 LBS | HEART RATE: 82 BPM | TEMPERATURE: 97.3 F

## 2024-02-21 DIAGNOSIS — C20 RECTAL ADENOCARCINOMA: Primary | ICD-10-CM

## 2024-02-21 DIAGNOSIS — C20 RECTAL ADENOCARCINOMA: ICD-10-CM

## 2024-02-21 LAB
ALBUMIN SERPL-MCNC: 3.3 G/DL (ref 3.5–5.2)
ALBUMIN/GLOB SERPL: 1 G/DL
ALP SERPL-CCNC: 106 U/L (ref 39–117)
ALT SERPL W P-5'-P-CCNC: 27 U/L (ref 1–41)
ANION GAP SERPL CALCULATED.3IONS-SCNC: 7 MMOL/L (ref 5–15)
AST SERPL-CCNC: 27 U/L (ref 1–40)
BASOPHILS # BLD AUTO: 0.07 10*3/MM3 (ref 0–0.2)
BASOPHILS NFR BLD AUTO: 0.8 % (ref 0–1.5)
BILIRUB SERPL-MCNC: 0.4 MG/DL (ref 0–1.2)
BUN SERPL-MCNC: 17 MG/DL (ref 8–23)
BUN/CREAT SERPL: 19.5 (ref 7–25)
CALCIUM SPEC-SCNC: 8.2 MG/DL (ref 8.2–9.6)
CHLORIDE SERPL-SCNC: 103 MMOL/L (ref 98–107)
CO2 SERPL-SCNC: 32 MMOL/L (ref 22–29)
CREAT SERPL-MCNC: 0.87 MG/DL (ref 0.76–1.27)
DEPRECATED RDW RBC AUTO: 53.4 FL (ref 37–54)
EGFRCR SERPLBLD CKD-EPI 2021: 81.5 ML/MIN/1.73
EOSINOPHIL # BLD AUTO: 0.18 10*3/MM3 (ref 0–0.4)
EOSINOPHIL NFR BLD AUTO: 2 % (ref 0.3–6.2)
ERYTHROCYTE [DISTWIDTH] IN BLOOD BY AUTOMATED COUNT: 15.4 % (ref 12.3–15.4)
GLOBULIN UR ELPH-MCNC: 3.3 GM/DL
GLUCOSE SERPL-MCNC: 344 MG/DL (ref 65–99)
HCT VFR BLD AUTO: 41.5 % (ref 37.5–51)
HGB BLD-MCNC: 13.3 G/DL (ref 13–17.7)
IMM GRANULOCYTES # BLD AUTO: 0.05 10*3/MM3 (ref 0–0.05)
IMM GRANULOCYTES NFR BLD AUTO: 0.5 % (ref 0–0.5)
LYMPHOCYTES # BLD AUTO: 2.75 10*3/MM3 (ref 0.7–3.1)
LYMPHOCYTES NFR BLD AUTO: 30.2 % (ref 19.6–45.3)
MCH RBC QN AUTO: 30.1 PG (ref 26.6–33)
MCHC RBC AUTO-ENTMCNC: 32 G/DL (ref 31.5–35.7)
MCV RBC AUTO: 93.9 FL (ref 79–97)
MONOCYTES # BLD AUTO: 0.86 10*3/MM3 (ref 0.1–0.9)
MONOCYTES NFR BLD AUTO: 9.4 % (ref 5–12)
NEUTROPHILS NFR BLD AUTO: 5.21 10*3/MM3 (ref 1.7–7)
NEUTROPHILS NFR BLD AUTO: 57.1 % (ref 42.7–76)
NRBC BLD AUTO-RTO: 0 /100 WBC (ref 0–0.2)
PLATELET # BLD AUTO: 142 10*3/MM3 (ref 140–450)
PMV BLD AUTO: 8.7 FL (ref 6–12)
POTASSIUM SERPL-SCNC: 4.4 MMOL/L (ref 3.5–5.2)
PROT SERPL-MCNC: 6.6 G/DL (ref 6–8.5)
RBC # BLD AUTO: 4.42 10*6/MM3 (ref 4.14–5.8)
SODIUM SERPL-SCNC: 142 MMOL/L (ref 136–145)
WBC NRBC COR # BLD AUTO: 9.12 10*3/MM3 (ref 3.4–10.8)

## 2024-02-21 PROCEDURE — 85025 COMPLETE CBC W/AUTO DIFF WBC: CPT

## 2024-02-21 PROCEDURE — G0463 HOSPITAL OUTPT CLINIC VISIT: HCPCS

## 2024-02-21 PROCEDURE — 80053 COMPREHEN METABOLIC PANEL: CPT

## 2024-02-21 PROCEDURE — 36415 COLL VENOUS BLD VENIPUNCTURE: CPT

## 2024-02-21 RX ORDER — INSULIN ASPART 100 [IU]/ML
25 INJECTION, SUSPENSION SUBCUTANEOUS
COMMUNITY
Start: 2024-02-16

## 2024-02-21 NOTE — TELEPHONE ENCOUNTER
----- Message from Damian Abraham MD sent at 2/21/2024  9:15 AM CST -----  Glucose 344- hold chemo this week. Reappoint to pcp re:  poorly controlled hyperglycemia  ----- Message -----  From: Lab, Background User  Sent: 2/21/2024   7:49 AM CST  To: Damian Abraham MD

## 2024-02-21 NOTE — PROGRESS NOTES
Treatment held today due to blood glucose of 344 per Dr. Abraham. Patient instructed to see PCP. Appointment r/s to next week. Nils Ortiz RN

## 2024-02-23 ENCOUNTER — TELEPHONE (OUTPATIENT)
Dept: ONCOLOGY | Facility: CLINIC | Age: 89
End: 2024-02-23
Payer: MEDICARE

## 2024-02-23 NOTE — TELEPHONE ENCOUNTER
Call from Dr. Prescott, (PCP) regarding the patient's poorly controlled hyperglycemia/diabetes.  Patient unable to get chemo last week due to glucose of 344.  Dr. Prescott wants to know what the target glucose level is for him to be able to adjust the patient's insulin accordingly.  We agreed on a glucose level ideally less than 200 or if not at least less than 250.  Dr. Prescott will be working on the patient's insulin doses so that his glucose levels fall within these parameters in order for the patient to be able to receive his chemotherapy.

## 2024-02-28 ENCOUNTER — INFUSION (OUTPATIENT)
Dept: ONCOLOGY | Facility: HOSPITAL | Age: 89
End: 2024-02-28
Payer: MEDICARE

## 2024-02-28 ENCOUNTER — LAB (OUTPATIENT)
Dept: LAB | Facility: HOSPITAL | Age: 89
End: 2024-02-28
Payer: MEDICARE

## 2024-02-28 VITALS
SYSTOLIC BLOOD PRESSURE: 139 MMHG | WEIGHT: 181 LBS | DIASTOLIC BLOOD PRESSURE: 56 MMHG | HEART RATE: 72 BPM | TEMPERATURE: 97.2 F | BODY MASS INDEX: 25.91 KG/M2 | HEIGHT: 70 IN | RESPIRATION RATE: 16 BRPM | OXYGEN SATURATION: 95 %

## 2024-02-28 DIAGNOSIS — C20 RECTAL ADENOCARCINOMA: ICD-10-CM

## 2024-02-28 DIAGNOSIS — C20 RECTAL ADENOCARCINOMA: Primary | ICD-10-CM

## 2024-02-28 LAB
ALBUMIN SERPL-MCNC: 3.7 G/DL (ref 3.5–5.2)
ALBUMIN/GLOB SERPL: 1.1 G/DL
ALP SERPL-CCNC: 108 U/L (ref 39–117)
ALT SERPL W P-5'-P-CCNC: 26 U/L (ref 1–41)
ANION GAP SERPL CALCULATED.3IONS-SCNC: 8 MMOL/L (ref 5–15)
AST SERPL-CCNC: 30 U/L (ref 1–40)
BASOPHILS # BLD AUTO: 0.04 10*3/MM3 (ref 0–0.2)
BASOPHILS NFR BLD AUTO: 0.5 % (ref 0–1.5)
BILIRUB SERPL-MCNC: 0.5 MG/DL (ref 0–1.2)
BUN SERPL-MCNC: 13 MG/DL (ref 8–23)
BUN/CREAT SERPL: 16 (ref 7–25)
CALCIUM SPEC-SCNC: 8.9 MG/DL (ref 8.2–9.6)
CHLORIDE SERPL-SCNC: 107 MMOL/L (ref 98–107)
CO2 SERPL-SCNC: 33 MMOL/L (ref 22–29)
CREAT SERPL-MCNC: 0.81 MG/DL (ref 0.76–1.27)
DEPRECATED RDW RBC AUTO: 53.4 FL (ref 37–54)
EGFRCR SERPLBLD CKD-EPI 2021: 83.2 ML/MIN/1.73
EOSINOPHIL # BLD AUTO: 0.31 10*3/MM3 (ref 0–0.4)
EOSINOPHIL NFR BLD AUTO: 3.7 % (ref 0.3–6.2)
ERYTHROCYTE [DISTWIDTH] IN BLOOD BY AUTOMATED COUNT: 15.6 % (ref 12.3–15.4)
GLOBULIN UR ELPH-MCNC: 3.4 GM/DL
GLUCOSE SERPL-MCNC: 104 MG/DL (ref 65–99)
HCT VFR BLD AUTO: 42.2 % (ref 37.5–51)
HGB BLD-MCNC: 13.8 G/DL (ref 13–17.7)
IMM GRANULOCYTES # BLD AUTO: 0.02 10*3/MM3 (ref 0–0.05)
IMM GRANULOCYTES NFR BLD AUTO: 0.2 % (ref 0–0.5)
LYMPHOCYTES # BLD AUTO: 3.01 10*3/MM3 (ref 0.7–3.1)
LYMPHOCYTES NFR BLD AUTO: 35.9 % (ref 19.6–45.3)
MCH RBC QN AUTO: 30.4 PG (ref 26.6–33)
MCHC RBC AUTO-ENTMCNC: 32.7 G/DL (ref 31.5–35.7)
MCV RBC AUTO: 93 FL (ref 79–97)
MONOCYTES # BLD AUTO: 0.73 10*3/MM3 (ref 0.1–0.9)
MONOCYTES NFR BLD AUTO: 8.7 % (ref 5–12)
NEUTROPHILS NFR BLD AUTO: 4.28 10*3/MM3 (ref 1.7–7)
NEUTROPHILS NFR BLD AUTO: 51 % (ref 42.7–76)
NRBC BLD AUTO-RTO: 0 /100 WBC (ref 0–0.2)
PLATELET # BLD AUTO: 148 10*3/MM3 (ref 140–450)
PMV BLD AUTO: 9.5 FL (ref 6–12)
POTASSIUM SERPL-SCNC: 4.1 MMOL/L (ref 3.5–5.2)
PROT SERPL-MCNC: 7.1 G/DL (ref 6–8.5)
RBC # BLD AUTO: 4.54 10*6/MM3 (ref 4.14–5.8)
SODIUM SERPL-SCNC: 148 MMOL/L (ref 136–145)
WBC NRBC COR # BLD AUTO: 8.39 10*3/MM3 (ref 3.4–10.8)

## 2024-02-28 PROCEDURE — 96411 CHEMO IV PUSH ADDL DRUG: CPT

## 2024-02-28 PROCEDURE — 25010000002 FOSAPREPITANT PER 1 MG: Performed by: INTERNAL MEDICINE

## 2024-02-28 PROCEDURE — 96375 TX/PRO/DX INJ NEW DRUG ADDON: CPT

## 2024-02-28 PROCEDURE — 96413 CHEMO IV INFUSION 1 HR: CPT

## 2024-02-28 PROCEDURE — 25010000002 FLUOROURACIL PER 500 MG: Performed by: INTERNAL MEDICINE

## 2024-02-28 PROCEDURE — 80053 COMPREHEN METABOLIC PANEL: CPT

## 2024-02-28 PROCEDURE — 0 DEXTROSE 5 % SOLUTION: Performed by: INTERNAL MEDICINE

## 2024-02-28 PROCEDURE — 25010000002 LEUCOVORIN CALCIUM PER 50 MG: Performed by: INTERNAL MEDICINE

## 2024-02-28 PROCEDURE — G0498 CHEMO EXTEND IV INFUS W/PUMP: HCPCS

## 2024-02-28 PROCEDURE — 96415 CHEMO IV INFUSION ADDL HR: CPT

## 2024-02-28 PROCEDURE — 96367 TX/PROPH/DG ADDL SEQ IV INF: CPT

## 2024-02-28 PROCEDURE — 25010000002 DEXAMETHASONE SODIUM PHOSPHATE 100 MG/10ML SOLUTION: Performed by: INTERNAL MEDICINE

## 2024-02-28 PROCEDURE — 25010000002 OXALIPLATIN PER 0.5 MG: Performed by: INTERNAL MEDICINE

## 2024-02-28 PROCEDURE — 85025 COMPLETE CBC W/AUTO DIFF WBC: CPT

## 2024-02-28 PROCEDURE — 0 DEXTROSE 5 % SOLUTION 250 ML FLEX CONT: Performed by: INTERNAL MEDICINE

## 2024-02-28 PROCEDURE — 25010000002 PALONOSETRON PER 25 MCG: Performed by: INTERNAL MEDICINE

## 2024-02-28 PROCEDURE — 96366 THER/PROPH/DIAG IV INF ADDON: CPT

## 2024-02-28 PROCEDURE — 25010000002 LEUCOVORIN 200 MG RECONSTITUTED SOLUTION 200 MG VIAL: Performed by: INTERNAL MEDICINE

## 2024-02-28 PROCEDURE — 96368 THER/DIAG CONCURRENT INF: CPT

## 2024-02-28 PROCEDURE — 36415 COLL VENOUS BLD VENIPUNCTURE: CPT

## 2024-02-28 RX ORDER — PALONOSETRON 0.05 MG/ML
0.25 INJECTION, SOLUTION INTRAVENOUS ONCE
Status: COMPLETED | OUTPATIENT
Start: 2024-02-28 | End: 2024-02-28

## 2024-02-28 RX ORDER — DEXTROSE MONOHYDRATE 50 MG/ML
20 INJECTION, SOLUTION INTRAVENOUS ONCE
Status: COMPLETED | OUTPATIENT
Start: 2024-02-28 | End: 2024-02-28

## 2024-02-28 RX ORDER — INSULIN LISPRO 100 [IU]/ML
INJECTION, SUSPENSION SUBCUTANEOUS
COMMUNITY
Start: 2024-02-22

## 2024-02-28 RX ORDER — DIPHENHYDRAMINE HYDROCHLORIDE 50 MG/ML
50 INJECTION INTRAMUSCULAR; INTRAVENOUS AS NEEDED
Status: DISCONTINUED | OUTPATIENT
Start: 2024-02-28 | End: 2024-02-28 | Stop reason: HOSPADM

## 2024-02-28 RX ORDER — FAMOTIDINE 10 MG/ML
20 INJECTION, SOLUTION INTRAVENOUS AS NEEDED
Status: DISCONTINUED | OUTPATIENT
Start: 2024-02-28 | End: 2024-02-28 | Stop reason: HOSPADM

## 2024-02-28 RX ORDER — FLUOROURACIL 50 MG/ML
320 INJECTION, SOLUTION INTRAVENOUS ONCE
Status: COMPLETED | OUTPATIENT
Start: 2024-02-28 | End: 2024-02-28

## 2024-02-28 RX ADMIN — DEXTROSE MONOHYDRATE 20 ML/HR: 50 INJECTION, SOLUTION INTRAVENOUS at 09:20

## 2024-02-28 RX ADMIN — OXALIPLATIN 135 MG: 5 INJECTION, SOLUTION, CONCENTRATE INTRAVENOUS at 10:19

## 2024-02-28 RX ADMIN — PALONOSETRON HYDROCHLORIDE 0.25 MG: 0.25 INJECTION, SOLUTION INTRAVENOUS at 09:20

## 2024-02-28 RX ADMIN — FOSAPREPITANT 150 MG: 150 INJECTION, POWDER, LYOPHILIZED, FOR SOLUTION INTRAVENOUS at 09:42

## 2024-02-28 RX ADMIN — FLUOROURACIL 640 MG: 50 INJECTION, SOLUTION INTRAVENOUS at 12:54

## 2024-02-28 RX ADMIN — FLUOROURACIL 3840 MG: 50 INJECTION, SOLUTION INTRAVENOUS at 13:01

## 2024-02-28 RX ADMIN — LEUCOVORIN CALCIUM 640 MG: 350 INJECTION, POWDER, LYOPHILIZED, FOR SUSPENSION INTRAMUSCULAR; INTRAVENOUS at 10:19

## 2024-02-28 RX ADMIN — DEXAMETHASONE SODIUM PHOSPHATE 12 MG: 10 INJECTION, SOLUTION INTRAMUSCULAR; INTRAVENOUS at 09:20

## 2024-03-01 ENCOUNTER — INFUSION (OUTPATIENT)
Dept: ONCOLOGY | Facility: HOSPITAL | Age: 89
End: 2024-03-01
Payer: MEDICARE

## 2024-03-01 VITALS
HEART RATE: 62 BPM | OXYGEN SATURATION: 90 % | TEMPERATURE: 97.2 F | WEIGHT: 185.6 LBS | BODY MASS INDEX: 26.57 KG/M2 | HEIGHT: 70 IN | RESPIRATION RATE: 16 BRPM | DIASTOLIC BLOOD PRESSURE: 43 MMHG | SYSTOLIC BLOOD PRESSURE: 137 MMHG

## 2024-03-01 DIAGNOSIS — C20 RECTAL ADENOCARCINOMA: Primary | ICD-10-CM

## 2024-03-01 DIAGNOSIS — Z45.2 ENCOUNTER FOR CARE RELATED TO VASCULAR ACCESS PORT: ICD-10-CM

## 2024-03-01 PROCEDURE — 25010000002 HEPARIN LOCK FLUSH PER 10 UNITS: Performed by: INTERNAL MEDICINE

## 2024-03-01 RX ORDER — HEPARIN SODIUM (PORCINE) LOCK FLUSH IV SOLN 100 UNIT/ML 100 UNIT/ML
500 SOLUTION INTRAVENOUS AS NEEDED
Status: DISCONTINUED | OUTPATIENT
Start: 2024-03-01 | End: 2024-03-01 | Stop reason: HOSPADM

## 2024-03-01 RX ORDER — SODIUM CHLORIDE 0.9 % (FLUSH) 0.9 %
10 SYRINGE (ML) INJECTION AS NEEDED
OUTPATIENT
Start: 2024-03-01

## 2024-03-01 RX ORDER — SODIUM CHLORIDE 0.9 % (FLUSH) 0.9 %
10 SYRINGE (ML) INJECTION AS NEEDED
Status: DISCONTINUED | OUTPATIENT
Start: 2024-03-01 | End: 2024-03-01 | Stop reason: HOSPADM

## 2024-03-01 RX ORDER — HEPARIN SODIUM (PORCINE) LOCK FLUSH IV SOLN 100 UNIT/ML 100 UNIT/ML
500 SOLUTION INTRAVENOUS AS NEEDED
OUTPATIENT
Start: 2024-03-01

## 2024-03-01 RX ADMIN — Medication 500 UNITS: at 10:55

## 2024-03-01 RX ADMIN — Medication 10 ML: at 10:54

## 2024-03-04 NOTE — PROGRESS NOTES
"[facility]  [address]  [facility] [address] [phone]    Patient Name: Juliocesar Tellez  Encounter Date: 03/11/2024  YOB: 1932  Patient Number: [id number]    REASON FOR VISIT: Juliocesar Tellez is a 89 yoM who returns in follow-up of stage III rectal adenocarcinoma.  He is currently receiving neoadjuvant FOLFOX- C1, 12/12/23; C2, 1/3/24; C3, 1/24/24; C4, 2/28/24. He is here with his spouse, Cristina.    I have reviewed the HPI and verified with the patient the accuracy of it. No changes to interval history since the information was documented. Damian Abraham MD 03/11/24      Diagnostic abnormalities:  --medical history includes diabetes, SARAHI, arthritis, BPH, hyperlipidemia, rectal bleeding and recently diagnosed rectal adenocarcinoma.  --9/18/23- Hgb 12.9 (pcp office)  --10/10/23-referred by Dr. Gerardo Prescott to GI for bright red rectal bleeding x 1 month.  No associated rectal pain, no weight loss no abdominal pain, no lightheadedness, dizziness nor black stool.  --11/9/23- Colonoscopy:  Rectal mass.  Malignant partially obstructing tumor from 4 to 11 cm proximal to the anus.  Biopsied.  Exam otherwise normal on direct and retroflexion views.  Final diagnosis:Large intestine, designated \"rectal mass\".  Invasive adenocarcinoma, well to moderately differentiated.  MSI by PCR: MSI-stable (LORENZO)  --11/20/23- Today the patient is seen for management considerations.  Sodium is 146 otherwise normal CMP, ferritin 24 (L), iron 46 (L), iron saturation 13% (L), B12 380, folate 19.7, CEA 15 (H), Hgb 12.1, MCV 93.3, platelets 196,000, WBC 8.67.  --12/1/23-CT chest-No definite evidence of metastatic disease in the chest. There is a 7 mm nodular density along the right minor fissure which is likely an intrafissural lymph node or small area of atelectasis/scarring. However, recommend special attention on follow-up imaging to " exclude a pulmonary nodule. Advanced pulmonary fibrosis, UIP pattern.  Mild bilateral hilar lymphadenopathy, likely reactive.   --12/1/23- CT abdomen/pelvis-  Thickening of the wall of the rectum consistent with the history of  rectal neoplasm. The outer wall of the rectum is somewhat ill-defined. Therefore, there may be some extension through the wall. In addition, the fat plane between the rectum and prostate is obscured suggesting possible extension of tumor into this area. There is a 5 mm lymph node in the left perirectal fat image 71 series 3. There is an additional small lymph node more superiorly in the pelvic mesenteric fat measuring 6-7 mm in short axis diameter. No other evidence of metastatic disease in the abdomen or pelvis. Bilateral inguinal hernias. The hernia on the right contains a portion of the bladder wall and peritoneal fat. The hernia on the left contains peritoneal fat. There is thickening of the bladder wall. The bladder is under distended. Wall thickening may be due to muscular hypertrophy as there is enlargement of the prostate. Artifact of under distention, infection  and inflammation are considered. Gallstones in the gallbladder.  --12/5/23- flexible sigmoidoscopy with rectal EUS, Dr. Calderon Cano  Known rectal mass was 80% circumferential and invasive appearing with ulceration extending from 4 cm to 14 cm  Endosonography revealed an irregular appearing heterogeneous mass measuring over 4.4 cm in greatest dimension with evidence of invasion through and obliteration of the muscularis propria.  Irregular borders were noted with questionable spread through the rectal wall.  The lesion did not appear to invade the prostate.  No definite lymphadenopathy was appreciated.    Previous interventions:  -- Venofer  mg- 12/4/23; 12/11/23; 12/18/23; 12/27/23  -- Neoadjuvant FOLFOX- C1, 12/12/23; C2, 1/3/24; C3, 1/24/24; C4, 2/28/24.      PAST MEDICAL HISTORY:  ALLERGIES:  Allergies   Allergen  Reactions    Sulfa Antibiotics Other (See Comments)     Flu like symptoms     CURRENT MEDICATIONS:  Outpatient Encounter Medications as of 3/11/2024   Medication Sig Dispense Refill    acetaminophen (Tylenol) 325 MG tablet Take 3 tablets by mouth Every 8 (Eight) Hours. Take every 8 hours for 3 days then take prn as needed.      aspirin 81 MG EC tablet Take 1 tablet by mouth Daily.      atorvastatin (LIPITOR) 40 MG tablet Take 1 tablet by mouth Daily.      azelastine (ASTELIN) 0.1 % nasal spray 2 sprays into the nostril(s) as directed by provider 2 (Two) Times a Day. Use in each nostril as directed      cetirizine (zyrTEC) 10 MG tablet Take 1 tablet by mouth Daily.      dicyclomine (BENTYL) 10 MG capsule Take 1 capsule by mouth 4 (Four) Times a Day Before Meals & at Bedtime.      HumaLOG Mix 75/25 KwikPen (75-25) 100 UNIT/ML suspension pen-injector pen       insulin lispro protamine-insulin lispro (humaLOG 75-25) (75-25) 100 UNIT/ML suspension injection Inject  under the skin into the appropriate area as directed Take As Directed. 60 MORNING 35 EVENING      ketoconazole (NIZORAL) 2 % cream Apply 1 Application topically to the appropriate area as directed Daily.      ketoconazole (NIZORAL) 2 % cream Apply 1 Application topically to the appropriate area as directed Daily.      lidocaine-prilocaine (EMLA) 2.5-2.5 % cream 30 minutes prior to access apply generous amount of Emla Cream to port site and cover with clear plastic wrap until ready for use 1 each 2    lisinopril (PRINIVIL,ZESTRIL) 2.5 MG tablet Take 1 tablet by mouth Daily.      magnesium oxide (MAGOX) 400 (241.3 Mg) MG tablet tablet Take 1 tablet by mouth Daily.      Menthol-Zinc Oxide (Calmoseptine) 0.44-20.6 % ointment Apply 1 Application topically to the appropriate area as directed 2 (Two) Times a Day.      NovoLOG Mix 70/30 (70-30) 100 UNIT/ML injection Inject 25 Units under the skin into the appropriate area as directed.      Olopatadine HCl (PATADAY OP)  "Apply  to eye(s) as directed by provider.      ondansetron (Zofran) 8 MG tablet Take 1 tablet by mouth Every 8 (Eight) Hours As Needed for Nausea or Vomiting. 30 tablet 2    prochlorperazine (COMPAZINE) 10 MG tablet Take 1 tablet by mouth Every 6 (Six) Hours As Needed for Nausea or Vomiting. 60 tablet 1    tamsulosin (FLOMAX) 0.4 MG capsule 24 hr capsule TAKE ONE CAPSULE DAILY      timolol (BLOCADREN) 5 MG tablet Take 1 tablet by mouth 2 (Two) Times a Day.      TRAVOPROST OP Apply  to eye(s) as directed by provider.       No facility-administered encounter medications on file as of 3/11/2024.     Adult illnesses:  Arthritis  BPH  Diabetes  Hemorrhoids  Hyperlipidemia  SARAHI  Primary open-angle glaucoma  Pseudophakia  Rectal bleeding  Rectal carcinoma    Past surgeries:  Tonsillectomy  Colonoscopy with 8 polyps, tubular adenoma, few diverticula, 12/31/2013  Colonoscopy, 11/9/23-rectal mass.  Malignant partially obstructing tumor from 4 to 11 cm proximal to the anus.  Biopsied.  Exam otherwise normal on direct and retroflexion views.  Final diagnosis:Large intestine, designated \"rectal mass\": -Invasive adenocarcinoma, well to moderately differentiated  Port-A-Cath placement in the right cephalic vein, 11/29/23-Dr. Ortiz    ADULT ILLNESSES:  Patient Active Problem List   Diagnosis Code    Chest pain, atypical R07.89    Diabetes mellitus E11.9    Elevated blood pressure reading without diagnosis of hypertension R03.0    BRBPR (bright red blood per rectum) K62.5    Rectal adenocarcinoma C20    Iron deficiency anemia D50.9    Encounter for care related to vascular access port Z45.2    Non-smoker Z78.9     SURGERIES:  Past Surgical History:   Procedure Laterality Date    COLONOSCOPY  12/31/2013    8 polyps, tubular adenoma, a few divertucula    COLONOSCOPY N/A 11/09/2023    Procedure: SIGMOIDOSCOPY FLEXIBLE;  Surgeon: Terry Malik MD;  Location: [facility] ENDOSCOPY;  Service: Gastroenterology;  Laterality: N/A;  preop; " "BRBPR  postop rectal mass  PCP Gerardo Prescott    TONSILLECTOMY      VENOUS ACCESS DEVICE (PORT) INSERTION N/A 11/29/2023    Procedure: Single Lumen Port-a-cath insertion with flouroscopy;  Surgeon: Marquita Ortiz MD;  Location: [facility] OR;  Service: General;  Laterality: N/A;     HEALTH MAINTENANCE ITEMS:  Health Maintenance Due   Topic Date Due    URINE MICROALBUMIN  Never done    Pneumococcal Vaccine 65+ (1 of 2 - PCV) Never done    RSV Vaccine - Adults (1 - 1-dose 60+ series) Never done    ZOSTER VACCINE (2 of 3) 03/31/2014    ANNUAL WELLNESS VISIT  Never done    HEMOGLOBIN A1C  07/15/2020    DIABETIC EYE EXAM  Never done    TDAP/TD VACCINES (2 - Tdap) 02/21/2022    INFLUENZA VACCINE  08/01/2023    COVID-19 Vaccine (1 - 2023-24 season) Never done    LIPID PANEL  09/27/2023       <no information>  Last Completed Colonoscopy       This patient has no relevant Health Maintenance data.            There is no immunization history on file for this patient.  Last Completed Mammogram       This patient has no relevant Health Maintenance data.              FAMILY HISTORY:  Family History   Problem Relation Age of Onset    No Known Problems Mother     No Known Problems Father     Liver cancer Sister     Colon cancer Neg Hx      SOCIAL HISTORY:  Social History     Socioeconomic History    Marital status:    Tobacco Use    Smoking status: Never    Smokeless tobacco: Never   Vaping Use    Vaping status: Never Used   Substance and Sexual Activity    Alcohol use: Never    Drug use: Never    Sexual activity: Defer       REVIEW OF SYSTEMS:  Review of Systems   Constitutional:  Positive for appetite change (\"less on chemo\". Declines appetite stimulant). Negative for activity change (\"about the same'), fatigue and unexpected weight loss.        Manages his ADLs, to include chores, \"some\" but not running errands and no longer drives. Is up and about, \"more than 50%.\" Ambulates with a cane at home    Chemo tolerance:  " "Fatigue.  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  \"Only somewhat.\" Soft stools without overt diarrhea.  Uses Imodium sparingly, \"once a day is enough.\"  No mouth sores, no nausea, no vomiting, no skin changes.   HENT: Negative.     Eyes: Negative.    Respiratory:  Negative for shortness of breath (Baseline exertional dyspnea but no SOB with routine activities).         SARAHI NOT on CPAP   Cardiovascular: Negative.    Gastrointestinal:  Positive for diarrhea (Loose stools particularly at night ~ 3x/day.  Uses Imodium sparingly.  Uses liners). Negative for abdominal distention, abdominal pain, anal bleeding, blood in stool (with BMs. \"not like it was before\"), constipation, nausea, rectal pain and vomiting.   Endocrine: Negative.    Genitourinary: Negative.    Musculoskeletal:  Positive for arthralgias (Chronic pains in hands, right ankle (prior injury)neck and lower back), back pain (Chronic), myalgias (with ambulation, \"50 feet\") and neck stiffness (Chronic \"stiffness\"). Negative for neck pain.   Skin: Negative.    Neurological: Negative.    Hematological: Negative.    Psychiatric/Behavioral: Negative.         /68   Pulse 89   Temp 98.4 °F (36.9 °C) (Temporal)   Resp 18   Ht 177.8 cm (70\")   Wt 83.7 kg (184 lb 9.6 oz)   SpO2 93%   BMI 26.49 kg/m²  Body surface area is 2.02 meters squared.  Pain Score    03/11/24 1304   PainSc: 0-No pain         Physical Exam  Vitals and nursing note reviewed.   Constitutional:       Comments: Pleasant, cooperative, heavyset middle-aged elderly male.  Brought in by wheelchair but ambulatory in the room.  ECOG 1-2 (from 2).      Regained 1 lb (had lost 2 lb at his prior visit) since the last visit   HENT:      Head: Normocephalic and atraumatic.   Eyes:      General: No scleral icterus.     Extraocular Movements: Extraocular movements intact.      Pupils: Pupils are equal, round, and reactive to " "light.   Cardiovascular:      Rate and Rhythm: Normal rate.   Pulmonary:      Effort: Pulmonary effort is normal.      Comments: Port in the right upper chest is well seated  Abdominal:      Palpations: Abdomen is soft. There is no mass (RUQ fullness/hepatomegaly?).      Tenderness: There is no abdominal tenderness.   Musculoskeletal:         General: Swelling present. Normal range of motion.      Cervical back: Normal range of motion.      Right lower leg: Edema (1+ (chronic)- wearing an ankle brace for prior ankle injury) present.      Left lower leg: Edema (1+ (chronic)) present.   Lymphadenopathy:      Cervical: No cervical adenopathy.   Skin:     General: Skin is warm.   Neurological:      General: No focal deficit present.      Mental Status: He is alert and oriented to person, place, and time.   Psychiatric:         Mood and Affect: Mood normal.         Behavior: Behavior normal.         Thought Content: Thought content normal.           Assessment:   Rectal adenocarcinoma  --Original tumor stage:  TNM III (cT3, cN1, M0). MSI by PCR: MSI-stable (LORENZO)  --Original tumor burden:  --11/9/23- Colonoscopy:  Rectal mass.  Malignant partially obstructing tumor from 4 to 11 cm proximal to the anus.  Biopsied.  Exam otherwise normal on direct and retroflexion views.  Final diagnosis:Large intestine, designated \"rectal mass\".  Invasive adenocarcinoma, well to moderately differentiated.  --12/1/23- CT CAP- No mets in chest.  Thickening of the wall of the rectum consistent with the history of rectal neoplasm. The outer wall of the rectum is somewhat ill-defined. Therefore, there may be some extension through the wall. In addition, the fat plane between the rectum and prostate is obscured suggesting possible extension of tumor into this area. There is a 5 mm lymph node in the left perirectal fat image 71 series 3. There is an additional small lymph node more superiorly in the pelvic mesenteric fat measuring 6-7 mm in short " axis diameter. No other evidence of mets.  --12/5/23- flexible sigmoidoscopy with rectal EUS, Dr. Calderon Cano  Known rectal mass was 80% circumferential and invasive appearing with ulceration extending from 4 cm to 14 cm  Endosonography revealed an irregular appearing heterogeneous mass measuring over 4.4 cm in greatest dimension with evidence of invasion through and obliteration of the muscularis propria.  Irregular borders were noted with questionable spread through the rectal wall.  The lesion did not appear to invade the prostate.  No definite lymphadenopathy was appreciated.    --Complications of tumor:  --Rectal bleeding, anemia  --Tumor status:  -- Neoadjuvant FOLFOX in progress  -  2/7/24- Consult [facility], Dr. Kelly-A/P: Mr Tellez is a 89 yo male being treated with chemotherapy for a T3N0M0 rectal cancer. I am not able to scope him today since he is in the middle of his chemotherapy. He was scheduled to come see me however cancelled the appt thinking it was not necessary at that time. I have explained to him the importance of me examining him prior to completion of his treatment so as to better plan if surgery will be necessary and if it is what surgery would be needed. I will have him come back after he has completed his chemotherapy but before he starts his radiation treatment. He has voiced understanding of the importance of this and will be sure to come to his next appt.   PLAN  Contact Dr Abraham to see when is will chemo be completed  Plan for rtc after completion of chemo but before starting xrt for flex sig  Will see back then again after he has completed his xrt.     2.   Anemia. Iron deficiency from rectal bleeding/malignancy  --11/20/23- fe 46 (L), fe sat 13% (L), ferritin 24 (L)  --Venofer x 4-12/4-12/27/23  --Hgb 13.8; MCV 93, 2/28/24 (prior linn:  Hgb 11.4)  3.   Diabetes on insulin  4.   Arthritis  5.   BPH  6.   SARAHI  7.   Advanced age  8.   Borderline PS (ECOG 1-2)    Plan:  Apprised of  "labs, 2/28/24 with improved anemia otherwise normal CBC, glucose 104, sodium 148 otherwise essentially normal CMP,  stable anemia otherwise normal CBC  Note MMR could not be done due to lack of material.  While MSI by PCR: MSI-stable (LORENZO)  Chemo tolerance.  Grade 1 diarrhea/neuropathy- needs diarrhea teaching sheets  Review consult by Dr. Kelly of [facility], 2/7/24 (above). Plan: RTC after completion of chemo but before starting xrt for flex sig. Will see back then again after he has completed his xrt.   Review consult, 12/6/23- Dr. Su of rad onc-, I agree with total neoadjuvant therapy with chemo planned to be delivered under Dr. Craft's care, subsequent concurrent chemo-radiation to the pelvis, I anticipate a dose of 5040 cGy over 28 fractions with referral to surgeon post completion of therapy.   Previously reviewed consult, 12/5/23- Dr. Calderon Cano of GI. Flex sig (above).  No definite adenopathy  Review CT scans of the chest, abdomen/pelvis, 12/1/23 (above).  \"There may be some extension through the wall. In addition, the fat plane between the rectum and prostate is obscured suggesting possible extension of tumor into this area. There is a 5 mm lymph node in the left perirectal fat and small lymph node more superiorly in the pelvic mesenteric fat measuring 6-7 mm in short axis diameter...no other mets.\"  Review NCCN guidelines 2.2023 rectal cancer-for clinical stage T4, N any - pMMR/LORENZO- primary treatment - total neoadjuvant therapy.  FOLFOX or [place] ox- 12-16 weeks)-long course chemo/RT with capecitabine or infusional [id number] (best tumor response 8 weeks after completion of RT)- reassess for surgery (Transabdominal resection or of CR consider observation.  If resection contraindciated-systemic therapy; If dMMR/MSI-H- primary treatment- NEOADJUVANT/DEFINITIVE IMMUNOTHERAPY (PREFERRED)- IO therapy for up to 6 months (nivolumab or pembrolizumab); OR Total neoadjuvant therapy (as above)  Discuss with " patient: FOLFOX (5-FU and oxaliplatin) chemotherapy (to include but not limited to: Myelosuppression (leukopenia, anemia, thrombocytopenia), agranulocytosis, hemorrhage, GI bleed, stomatitis, coronary vasospasm, myocardial ischemia, anaphylaxis, nausea, vomiting, diarrhea, anorexia, hand-foot syndrome, alopecia, dermatitis, photosensitivity, skin ulcers, acute cerebellar syndrome, headache, confusion, ocular irritation, hyperlacrimation, nail discoloration; oxaliplatin: Hypersensitivity reactions, anaphylaxis, laryngospasm, thromboembolism, myelosuppression (pancytopenia), hemolytic uremic syndrome, pulmonary fibrosis, interstitial lung disease, hepatotoxicity, pancreatitis, ileus, GI bleed, nausea/vomiting, severe diarrhea, metabolic acidosis, acute renal failure, reversible posterior leukoencephalopathy syndrome, peripheral neuropathy, optic neuritis, deafness, seizures, stomatitis, liver enzyme abnormalities, anorexia, cough, constipation, belly pain, fever, infection, dyspnea, epistaxis, edema, taste changes, myalgias, hyperglycemia, headache, insomnia, dyspepsia, back pain, injection site reaction, weight loss, peripheral edema, pharyngolaryngeal dysesthesia).  Questions answered.  He wishes to press on     10.   Schedule treatment - C5, 3/13/24; C6, 3/27/24 at [facility]:  To be administered every 2 weeks x 6 cycles- due to age, comorbidities and PS will dose reduce 20% up front from C1     BSA =    D1:   Oxaliplatin 85 mg/m2 IVPB over 2 hours (TD =  mg).   Precede and follow Oxaliplatin with: Ca Gluconate 2 gm and MgSO4 2 gm in 250 mL D5W IVPB over 30 min.   Leucovorin 400 mg/m2 IVPB over 2 hours (TD= mg).   5- mg/ m2 IVP (TD =  mg).   Begin 5-FU 2400? mg/m2 IVPB over 46 hours. (TD=  mg).                --Premedicate with:   Aloxi 0.25 mg IV   Emend 130 mg IV   Decadron 10 mg IV      11.  Upon initiation of chemo:  CMP and CBC weekly, with Procrit 40,000 if hemoglobin less than 10 and hematocrit less than 30  and Zarxio/Neupogen 480 mcg subcutaneously x5 days if ANC less than 1.0 at [facility]     12. Rx:   Zofran 8 mg p.o. 3 times daily #30   Imodium 2 mg po as directed       13.  Reappoint to Dr. Kelly after C6 chemo on 3/27/24  14.  Reappoint to Dr. Barber Re:  Assess for neoadjuvant RT +5-FU  15.  Return to office in 4 weeks with preoffice CBC, diff and CMP    I spent ~44 minutes caring for Juliocesar on this date of service. This time includes time spent by me in the following activities: preparing for the visit, reviewing tests, performing a medically appropriate examination and/or evaluation, counseling and educating the patient/family/caregiver, ordering medications, tests, or procedures and documenting information in the medical record.

## 2024-03-07 DIAGNOSIS — C20 RECTAL ADENOCARCINOMA: Primary | ICD-10-CM

## 2024-03-07 RX ORDER — FLUOROURACIL 50 MG/ML
320 INJECTION, SOLUTION INTRAVENOUS ONCE
OUTPATIENT
Start: 2024-03-13

## 2024-03-07 RX ORDER — FAMOTIDINE 10 MG/ML
20 INJECTION, SOLUTION INTRAVENOUS AS NEEDED
OUTPATIENT
Start: 2024-03-13

## 2024-03-07 RX ORDER — DEXTROSE MONOHYDRATE 50 MG/ML
20 INJECTION, SOLUTION INTRAVENOUS ONCE
OUTPATIENT
Start: 2024-03-13

## 2024-03-07 RX ORDER — PALONOSETRON 0.05 MG/ML
0.25 INJECTION, SOLUTION INTRAVENOUS ONCE
OUTPATIENT
Start: 2024-03-13

## 2024-03-07 RX ORDER — DIPHENHYDRAMINE HYDROCHLORIDE 50 MG/ML
50 INJECTION INTRAMUSCULAR; INTRAVENOUS AS NEEDED
OUTPATIENT
Start: 2024-03-13

## 2024-03-11 ENCOUNTER — OFFICE VISIT (OUTPATIENT)
Dept: ONCOLOGY | Facility: CLINIC | Age: 89
End: 2024-03-11
Payer: MEDICARE

## 2024-03-11 VITALS
BODY MASS INDEX: 26.43 KG/M2 | HEIGHT: 70 IN | WEIGHT: 184.6 LBS | TEMPERATURE: 98.4 F | DIASTOLIC BLOOD PRESSURE: 68 MMHG | SYSTOLIC BLOOD PRESSURE: 126 MMHG | OXYGEN SATURATION: 93 % | RESPIRATION RATE: 18 BRPM | HEART RATE: 89 BPM

## 2024-03-11 DIAGNOSIS — C20 RECTAL ADENOCARCINOMA: Primary | ICD-10-CM

## 2024-03-11 PROCEDURE — 1126F AMNT PAIN NOTED NONE PRSNT: CPT | Performed by: INTERNAL MEDICINE

## 2024-03-11 PROCEDURE — 99215 OFFICE O/P EST HI 40 MIN: CPT | Performed by: INTERNAL MEDICINE

## 2024-03-13 ENCOUNTER — INFUSION (OUTPATIENT)
Dept: ONCOLOGY | Facility: HOSPITAL | Age: 89
End: 2024-03-13
Payer: MEDICARE

## 2024-03-13 ENCOUNTER — LAB (OUTPATIENT)
Dept: LAB | Facility: HOSPITAL | Age: 89
End: 2024-03-13
Payer: MEDICARE

## 2024-03-13 VITALS
HEART RATE: 79 BPM | DIASTOLIC BLOOD PRESSURE: 47 MMHG | HEIGHT: 70 IN | TEMPERATURE: 96.9 F | SYSTOLIC BLOOD PRESSURE: 107 MMHG | OXYGEN SATURATION: 96 % | WEIGHT: 182 LBS | BODY MASS INDEX: 26.05 KG/M2 | RESPIRATION RATE: 18 BRPM

## 2024-03-13 DIAGNOSIS — C20 RECTAL ADENOCARCINOMA: Primary | ICD-10-CM

## 2024-03-13 DIAGNOSIS — C20 RECTAL ADENOCARCINOMA: ICD-10-CM

## 2024-03-13 LAB
ALBUMIN SERPL-MCNC: 3.4 G/DL (ref 3.5–5.2)
ALBUMIN/GLOB SERPL: 1.1 G/DL
ALP SERPL-CCNC: 109 U/L (ref 39–117)
ALT SERPL W P-5'-P-CCNC: 21 U/L (ref 1–41)
ANION GAP SERPL CALCULATED.3IONS-SCNC: 8 MMOL/L (ref 5–15)
AST SERPL-CCNC: 24 U/L (ref 1–40)
BASOPHILS # BLD AUTO: 0.02 10*3/MM3 (ref 0–0.2)
BASOPHILS NFR BLD AUTO: 0.4 % (ref 0–1.5)
BILIRUB SERPL-MCNC: 0.6 MG/DL (ref 0–1.2)
BUN SERPL-MCNC: 14 MG/DL (ref 8–23)
BUN/CREAT SERPL: 18.2 (ref 7–25)
CALCIUM SPEC-SCNC: 8.6 MG/DL (ref 8.2–9.6)
CHLORIDE SERPL-SCNC: 105 MMOL/L (ref 98–107)
CO2 SERPL-SCNC: 32 MMOL/L (ref 22–29)
CREAT SERPL-MCNC: 0.77 MG/DL (ref 0.76–1.27)
DEPRECATED RDW RBC AUTO: 53.2 FL (ref 37–54)
EGFRCR SERPLBLD CKD-EPI 2021: 84.5 ML/MIN/1.73
EOSINOPHIL # BLD AUTO: 0.31 10*3/MM3 (ref 0–0.4)
EOSINOPHIL NFR BLD AUTO: 6.1 % (ref 0.3–6.2)
ERYTHROCYTE [DISTWIDTH] IN BLOOD BY AUTOMATED COUNT: 15.7 % (ref 12.3–15.4)
GLOBULIN UR ELPH-MCNC: 3.1 GM/DL
GLUCOSE SERPL-MCNC: 147 MG/DL (ref 65–99)
HCT VFR BLD AUTO: 40.1 % (ref 37.5–51)
HGB BLD-MCNC: 12.9 G/DL (ref 13–17.7)
LYMPHOCYTES # BLD AUTO: 1.91 10*3/MM3 (ref 0.7–3.1)
LYMPHOCYTES NFR BLD AUTO: 37.8 % (ref 19.6–45.3)
MCH RBC QN AUTO: 30.4 PG (ref 26.6–33)
MCHC RBC AUTO-ENTMCNC: 32.2 G/DL (ref 31.5–35.7)
MCV RBC AUTO: 94.4 FL (ref 79–97)
MONOCYTES # BLD AUTO: 0.55 10*3/MM3 (ref 0.1–0.9)
MONOCYTES NFR BLD AUTO: 10.9 % (ref 5–12)
NEUTROPHILS NFR BLD AUTO: 2.25 10*3/MM3 (ref 1.7–7)
NEUTROPHILS NFR BLD AUTO: 44.6 % (ref 42.7–76)
PLATELET # BLD AUTO: 98 10*3/MM3 (ref 140–450)
PMV BLD AUTO: 9.8 FL (ref 6–12)
POTASSIUM SERPL-SCNC: 4.1 MMOL/L (ref 3.5–5.2)
PROT SERPL-MCNC: 6.5 G/DL (ref 6–8.5)
RBC # BLD AUTO: 4.25 10*6/MM3 (ref 4.14–5.8)
SODIUM SERPL-SCNC: 145 MMOL/L (ref 136–145)
WBC NRBC COR # BLD AUTO: 5.05 10*3/MM3 (ref 3.4–10.8)

## 2024-03-13 PROCEDURE — 80053 COMPREHEN METABOLIC PANEL: CPT

## 2024-03-13 PROCEDURE — 85025 COMPLETE CBC W/AUTO DIFF WBC: CPT

## 2024-03-13 PROCEDURE — 36415 COLL VENOUS BLD VENIPUNCTURE: CPT

## 2024-03-13 PROCEDURE — G0463 HOSPITAL OUTPT CLINIC VISIT: HCPCS

## 2024-03-13 RX ORDER — DIPHENHYDRAMINE HYDROCHLORIDE 50 MG/ML
50 INJECTION INTRAMUSCULAR; INTRAVENOUS AS NEEDED
OUTPATIENT
Start: 2024-04-08

## 2024-03-13 RX ORDER — PALONOSETRON 0.05 MG/ML
0.25 INJECTION, SOLUTION INTRAVENOUS ONCE
OUTPATIENT
Start: 2024-04-08

## 2024-03-13 RX ORDER — FAMOTIDINE 10 MG/ML
20 INJECTION, SOLUTION INTRAVENOUS AS NEEDED
OUTPATIENT
Start: 2024-04-08

## 2024-03-13 RX ORDER — FLUOROURACIL 50 MG/ML
320 INJECTION, SOLUTION INTRAVENOUS ONCE
OUTPATIENT
Start: 2024-04-08

## 2024-03-13 RX ORDER — DEXTROSE MONOHYDRATE 50 MG/ML
20 INJECTION, SOLUTION INTRAVENOUS ONCE
OUTPATIENT
Start: 2024-04-08

## 2024-03-13 NOTE — PROGRESS NOTES
[id number] Contacted Sabra with MD office r/t labs, platelets 98. Per Dr Abraham hold treatment today and move to Monday. GENIE Willis RN

## 2024-03-18 ENCOUNTER — TELEPHONE (OUTPATIENT)
Dept: ONCOLOGY | Facility: CLINIC | Age: 89
End: 2024-03-18
Payer: MEDICARE

## 2024-03-18 ENCOUNTER — INFUSION (OUTPATIENT)
Dept: ONCOLOGY | Facility: HOSPITAL | Age: 89
End: 2024-03-18
Payer: MEDICARE

## 2024-03-18 ENCOUNTER — LAB (OUTPATIENT)
Dept: LAB | Facility: HOSPITAL | Age: 89
End: 2024-03-18
Payer: MEDICARE

## 2024-03-18 VITALS
TEMPERATURE: 96.5 F | SYSTOLIC BLOOD PRESSURE: 142 MMHG | OXYGEN SATURATION: 94 % | BODY MASS INDEX: 26.17 KG/M2 | RESPIRATION RATE: 18 BRPM | WEIGHT: 182.8 LBS | DIASTOLIC BLOOD PRESSURE: 61 MMHG | HEIGHT: 70 IN | HEART RATE: 85 BPM

## 2024-03-18 DIAGNOSIS — C20 RECTAL ADENOCARCINOMA: ICD-10-CM

## 2024-03-18 DIAGNOSIS — C20 RECTAL ADENOCARCINOMA: Primary | ICD-10-CM

## 2024-03-18 LAB
ALBUMIN SERPL-MCNC: 3.5 G/DL (ref 3.5–5.2)
ALBUMIN/GLOB SERPL: 1.1 G/DL
ALP SERPL-CCNC: 116 U/L (ref 39–117)
ALT SERPL W P-5'-P-CCNC: 20 U/L (ref 1–41)
ANION GAP SERPL CALCULATED.3IONS-SCNC: 9 MMOL/L (ref 5–15)
AST SERPL-CCNC: 25 U/L (ref 1–40)
BILIRUB SERPL-MCNC: 0.5 MG/DL (ref 0–1.2)
BUN SERPL-MCNC: 12 MG/DL (ref 8–23)
BUN/CREAT SERPL: 15.6 (ref 7–25)
CALCIUM SPEC-SCNC: 9.1 MG/DL (ref 8.2–9.6)
CHLORIDE SERPL-SCNC: 104 MMOL/L (ref 98–107)
CO2 SERPL-SCNC: 33 MMOL/L (ref 22–29)
CREAT SERPL-MCNC: 0.77 MG/DL (ref 0.76–1.27)
DEPRECATED RDW RBC AUTO: 54.7 FL (ref 37–54)
EGFRCR SERPLBLD CKD-EPI 2021: 84.5 ML/MIN/1.73
EOSINOPHIL # BLD MANUAL: 0.33 10*3/MM3 (ref 0–0.4)
EOSINOPHIL NFR BLD MANUAL: 6.3 % (ref 0.3–6.2)
ERYTHROCYTE [DISTWIDTH] IN BLOOD BY AUTOMATED COUNT: 15.7 % (ref 12.3–15.4)
GLOBULIN UR ELPH-MCNC: 3.3 GM/DL
GLUCOSE SERPL-MCNC: 114 MG/DL (ref 65–99)
HCT VFR BLD AUTO: 42.5 % (ref 37.5–51)
HGB BLD-MCNC: 13.8 G/DL (ref 13–17.7)
LYMPHOCYTES # BLD MANUAL: 3.13 10*3/MM3 (ref 0.7–3.1)
LYMPHOCYTES NFR BLD MANUAL: 13.5 % (ref 5–12)
MCH RBC QN AUTO: 30.7 PG (ref 26.6–33)
MCHC RBC AUTO-ENTMCNC: 32.5 G/DL (ref 31.5–35.7)
MCV RBC AUTO: 94.4 FL (ref 79–97)
MONOCYTES # BLD: 0.7 10*3/MM3 (ref 0.1–0.9)
NEUTROPHILS # BLD AUTO: 1.03 10*3/MM3 (ref 1.7–7)
NEUTROPHILS NFR BLD MANUAL: 13.5 % (ref 42.7–76)
NEUTS BAND NFR BLD MANUAL: 6.3 % (ref 0–5)
NEUTS VAC BLD QL SMEAR: ABNORMAL
PLAT MORPH BLD: NORMAL
PLATELET # BLD AUTO: 151 10*3/MM3 (ref 140–450)
PMV BLD AUTO: 9.4 FL (ref 6–12)
POTASSIUM SERPL-SCNC: 4.4 MMOL/L (ref 3.5–5.2)
PROT SERPL-MCNC: 6.8 G/DL (ref 6–8.5)
RBC # BLD AUTO: 4.5 10*6/MM3 (ref 4.14–5.8)
RBC MORPH BLD: NORMAL
SODIUM SERPL-SCNC: 146 MMOL/L (ref 136–145)
VARIANT LYMPHS NFR BLD MANUAL: 20.8 % (ref 0–5)
VARIANT LYMPHS NFR BLD MANUAL: 39.6 % (ref 19.6–45.3)
WBC NRBC COR # BLD AUTO: 5.18 10*3/MM3 (ref 3.4–10.8)

## 2024-03-18 PROCEDURE — 85007 BL SMEAR W/DIFF WBC COUNT: CPT

## 2024-03-18 PROCEDURE — 36415 COLL VENOUS BLD VENIPUNCTURE: CPT

## 2024-03-18 PROCEDURE — 80053 COMPREHEN METABOLIC PANEL: CPT

## 2024-03-18 PROCEDURE — G0463 HOSPITAL OUTPT CLINIC VISIT: HCPCS

## 2024-03-18 PROCEDURE — 85025 COMPLETE CBC W/AUTO DIFF WBC: CPT

## 2024-03-18 NOTE — PROGRESS NOTES
S/w Dr. Abraham/Sabra regarding ANC.  To hold tx today and R/S for next week.  Pt aware. ANAYELI Mcleod

## 2024-03-18 NOTE — TELEPHONE ENCOUNTER
Juliocesar is here for tx. Today his ANC is low at 1.03. Per Dr. Chopra we will hold tx this week. Recheck in 1 week to see if okay for tx.

## 2024-03-25 ENCOUNTER — INFUSION (OUTPATIENT)
Dept: ONCOLOGY | Facility: HOSPITAL | Age: 89
End: 2024-03-25
Payer: MEDICARE

## 2024-03-25 ENCOUNTER — LAB (OUTPATIENT)
Dept: LAB | Facility: HOSPITAL | Age: 89
End: 2024-03-25
Payer: MEDICARE

## 2024-03-25 ENCOUNTER — TELEPHONE (OUTPATIENT)
Dept: ONCOLOGY | Facility: CLINIC | Age: 89
End: 2024-03-25
Payer: MEDICARE

## 2024-03-25 VITALS
HEART RATE: 80 BPM | WEIGHT: 183 LBS | RESPIRATION RATE: 16 BRPM | HEIGHT: 70 IN | DIASTOLIC BLOOD PRESSURE: 52 MMHG | BODY MASS INDEX: 26.2 KG/M2 | OXYGEN SATURATION: 95 % | SYSTOLIC BLOOD PRESSURE: 149 MMHG | TEMPERATURE: 97.8 F

## 2024-03-25 DIAGNOSIS — C20 RECTAL ADENOCARCINOMA: Primary | ICD-10-CM

## 2024-03-25 DIAGNOSIS — C20 RECTAL ADENOCARCINOMA: ICD-10-CM

## 2024-03-25 DIAGNOSIS — Z45.2 ENCOUNTER FOR CARE RELATED TO VASCULAR ACCESS PORT: ICD-10-CM

## 2024-03-25 LAB
ALBUMIN SERPL-MCNC: 3.4 G/DL (ref 3.5–5.2)
ALBUMIN/GLOB SERPL: 0.9 G/DL
ALP SERPL-CCNC: 116 U/L (ref 39–117)
ALT SERPL W P-5'-P-CCNC: 28 U/L (ref 1–41)
ANION GAP SERPL CALCULATED.3IONS-SCNC: 7 MMOL/L (ref 5–15)
AST SERPL-CCNC: 35 U/L (ref 1–40)
BASOPHILS # BLD AUTO: 0.08 10*3/MM3 (ref 0–0.2)
BASOPHILS NFR BLD AUTO: 0.7 % (ref 0–1.5)
BILIRUB SERPL-MCNC: 0.5 MG/DL (ref 0–1.2)
BUN SERPL-MCNC: 11 MG/DL (ref 8–23)
BUN/CREAT SERPL: 15.3 (ref 7–25)
CALCIUM SPEC-SCNC: 9.4 MG/DL (ref 8.2–9.6)
CHLORIDE SERPL-SCNC: 104 MMOL/L (ref 98–107)
CO2 SERPL-SCNC: 35 MMOL/L (ref 22–29)
CREAT SERPL-MCNC: 0.72 MG/DL (ref 0.76–1.27)
DEPRECATED RDW RBC AUTO: 51.3 FL (ref 37–54)
EGFRCR SERPLBLD CKD-EPI 2021: 86.3 ML/MIN/1.73
EOSINOPHIL # BLD AUTO: 0.11 10*3/MM3 (ref 0–0.4)
EOSINOPHIL NFR BLD AUTO: 0.9 % (ref 0.3–6.2)
ERYTHROCYTE [DISTWIDTH] IN BLOOD BY AUTOMATED COUNT: 14.8 % (ref 12.3–15.4)
GLOBULIN UR ELPH-MCNC: 3.8 GM/DL
GLUCOSE SERPL-MCNC: 73 MG/DL (ref 65–99)
HCT VFR BLD AUTO: 43.1 % (ref 37.5–51)
HGB BLD-MCNC: 14.1 G/DL (ref 13–17.7)
IMM GRANULOCYTES # BLD AUTO: 0.06 10*3/MM3 (ref 0–0.05)
IMM GRANULOCYTES NFR BLD AUTO: 0.5 % (ref 0–0.5)
LYMPHOCYTES # BLD AUTO: 3.56 10*3/MM3 (ref 0.7–3.1)
LYMPHOCYTES NFR BLD AUTO: 30.2 % (ref 19.6–45.3)
MCH RBC QN AUTO: 30.5 PG (ref 26.6–33)
MCHC RBC AUTO-ENTMCNC: 32.7 G/DL (ref 31.5–35.7)
MCV RBC AUTO: 93.1 FL (ref 79–97)
MONOCYTES # BLD AUTO: 0.95 10*3/MM3 (ref 0.1–0.9)
MONOCYTES NFR BLD AUTO: 8.1 % (ref 5–12)
NEUTROPHILS NFR BLD AUTO: 59.6 % (ref 42.7–76)
NEUTROPHILS NFR BLD AUTO: 7.02 10*3/MM3 (ref 1.7–7)
NRBC BLD AUTO-RTO: 0 /100 WBC (ref 0–0.2)
PLATELET # BLD AUTO: 165 10*3/MM3 (ref 140–450)
PMV BLD AUTO: 8.9 FL (ref 6–12)
POTASSIUM SERPL-SCNC: 4.9 MMOL/L (ref 3.5–5.2)
PROT SERPL-MCNC: 7.2 G/DL (ref 6–8.5)
RBC # BLD AUTO: 4.63 10*6/MM3 (ref 4.14–5.8)
SODIUM SERPL-SCNC: 146 MMOL/L (ref 136–145)
WBC NRBC COR # BLD AUTO: 11.78 10*3/MM3 (ref 3.4–10.8)

## 2024-03-25 PROCEDURE — 96375 TX/PRO/DX INJ NEW DRUG ADDON: CPT

## 2024-03-25 PROCEDURE — 96411 CHEMO IV PUSH ADDL DRUG: CPT

## 2024-03-25 PROCEDURE — 25010000002 LEUCOVORIN CALCIUM PER 50 MG: Performed by: INTERNAL MEDICINE

## 2024-03-25 PROCEDURE — 80053 COMPREHEN METABOLIC PANEL: CPT

## 2024-03-25 PROCEDURE — 0 DEXTROSE 5 % SOLUTION: Performed by: INTERNAL MEDICINE

## 2024-03-25 PROCEDURE — 36415 COLL VENOUS BLD VENIPUNCTURE: CPT

## 2024-03-25 PROCEDURE — 0 DEXTROSE 5 % SOLUTION 250 ML FLEX CONT: Performed by: INTERNAL MEDICINE

## 2024-03-25 PROCEDURE — 96415 CHEMO IV INFUSION ADDL HR: CPT

## 2024-03-25 PROCEDURE — 85025 COMPLETE CBC W/AUTO DIFF WBC: CPT

## 2024-03-25 PROCEDURE — G0498 CHEMO EXTEND IV INFUS W/PUMP: HCPCS

## 2024-03-25 PROCEDURE — 25010000002 PALONOSETRON PER 25 MCG: Performed by: INTERNAL MEDICINE

## 2024-03-25 PROCEDURE — 96367 TX/PROPH/DG ADDL SEQ IV INF: CPT

## 2024-03-25 PROCEDURE — 25010000002 FOSAPREPITANT PER 1 MG: Performed by: INTERNAL MEDICINE

## 2024-03-25 PROCEDURE — 25010000002 OXALIPLATIN PER 0.5 MG: Performed by: INTERNAL MEDICINE

## 2024-03-25 PROCEDURE — 25010000002 FLUOROURACIL PER 500 MG: Performed by: INTERNAL MEDICINE

## 2024-03-25 PROCEDURE — 25010000002 DEXAMETHASONE SODIUM PHOSPHATE 100 MG/10ML SOLUTION: Performed by: INTERNAL MEDICINE

## 2024-03-25 PROCEDURE — 96413 CHEMO IV INFUSION 1 HR: CPT

## 2024-03-25 RX ORDER — HEPARIN SODIUM (PORCINE) LOCK FLUSH IV SOLN 100 UNIT/ML 100 UNIT/ML
500 SOLUTION INTRAVENOUS AS NEEDED
Status: CANCELLED | OUTPATIENT
Start: 2024-03-25

## 2024-03-25 RX ORDER — DIPHENHYDRAMINE HYDROCHLORIDE 50 MG/ML
50 INJECTION INTRAMUSCULAR; INTRAVENOUS AS NEEDED
Status: DISCONTINUED | OUTPATIENT
Start: 2024-03-25 | End: 2024-03-25 | Stop reason: HOSPADM

## 2024-03-25 RX ORDER — SODIUM CHLORIDE 0.9 % (FLUSH) 0.9 %
10 SYRINGE (ML) INJECTION AS NEEDED
Status: DISCONTINUED | OUTPATIENT
Start: 2024-03-25 | End: 2024-03-25 | Stop reason: HOSPADM

## 2024-03-25 RX ORDER — FAMOTIDINE 10 MG/ML
20 INJECTION, SOLUTION INTRAVENOUS AS NEEDED
Status: DISCONTINUED | OUTPATIENT
Start: 2024-03-25 | End: 2024-03-25 | Stop reason: HOSPADM

## 2024-03-25 RX ORDER — HEPARIN SODIUM (PORCINE) LOCK FLUSH IV SOLN 100 UNIT/ML 100 UNIT/ML
500 SOLUTION INTRAVENOUS AS NEEDED
Status: DISCONTINUED | OUTPATIENT
Start: 2024-03-25 | End: 2024-03-25 | Stop reason: HOSPADM

## 2024-03-25 RX ORDER — SODIUM CHLORIDE 0.9 % (FLUSH) 0.9 %
10 SYRINGE (ML) INJECTION AS NEEDED
Status: CANCELLED | OUTPATIENT
Start: 2024-03-25

## 2024-03-25 RX ORDER — PALONOSETRON 0.05 MG/ML
0.25 INJECTION, SOLUTION INTRAVENOUS ONCE
Status: COMPLETED | OUTPATIENT
Start: 2024-03-25 | End: 2024-03-25

## 2024-03-25 RX ORDER — FLUOROURACIL 50 MG/ML
320 INJECTION, SOLUTION INTRAVENOUS ONCE
Status: COMPLETED | OUTPATIENT
Start: 2024-03-25 | End: 2024-03-25

## 2024-03-25 RX ORDER — DEXTROSE MONOHYDRATE 50 MG/ML
20 INJECTION, SOLUTION INTRAVENOUS ONCE
Status: COMPLETED | OUTPATIENT
Start: 2024-03-25 | End: 2024-03-25

## 2024-03-25 RX ADMIN — FLUOROURACIL 640 MG: 50 INJECTION, SOLUTION INTRAVENOUS at 13:44

## 2024-03-25 RX ADMIN — PALONOSETRON HYDROCHLORIDE 0.25 MG: 0.25 INJECTION, SOLUTION INTRAVENOUS at 10:40

## 2024-03-25 RX ADMIN — FLUOROURACIL 3840 MG: 50 INJECTION, SOLUTION INTRAVENOUS at 13:48

## 2024-03-25 RX ADMIN — FOSAPREPITANT 150 MG: 150 INJECTION, POWDER, LYOPHILIZED, FOR SOLUTION INTRAVENOUS at 11:00

## 2024-03-25 RX ADMIN — LEUCOVORIN CALCIUM 640 MG: 350 INJECTION, POWDER, LYOPHILIZED, FOR SUSPENSION INTRAMUSCULAR; INTRAVENOUS at 11:33

## 2024-03-25 RX ADMIN — DEXTROSE MONOHYDRATE 20 ML/HR: 50 INJECTION, SOLUTION INTRAVENOUS at 10:39

## 2024-03-25 RX ADMIN — OXALIPLATIN 135 MG: 5 INJECTION, SOLUTION INTRAVENOUS at 11:32

## 2024-03-25 RX ADMIN — DEXAMETHASONE SODIUM PHOSPHATE 12 MG: 10 INJECTION, SOLUTION INTRAMUSCULAR; INTRAVENOUS at 10:41

## 2024-03-25 NOTE — TELEPHONE ENCOUNTER
Message from Juan A GUADALPUE with the infusion Center: Juliocesar Tellez 12/19/1932 here for ox/leuc/5fu can you have Dr. Chopra review his labs and advise for treatment please? Says he doesn't feel sick or anything different from his normal and hasn't been on a steroid. WBC 11.78 ANC 7.02       Per Dr. Keysha Gandara is okay for tx.

## 2024-03-26 ENCOUNTER — TELEPHONE (OUTPATIENT)
Dept: ONCOLOGY | Facility: CLINIC | Age: 89
End: 2024-03-26
Payer: MEDICARE

## 2024-03-26 NOTE — TELEPHONE ENCOUNTER
Call from Dr. Adame from [facility] to Dr. Chopra. Patient is seen there today with elevated glucose of 360, elevated WBC, cough, and dysuria. They will keep him for observation.

## 2024-03-27 ENCOUNTER — INFUSION (OUTPATIENT)
Dept: ONCOLOGY | Facility: HOSPITAL | Age: 89
End: 2024-03-27
Payer: MEDICARE

## 2024-03-27 VITALS
SYSTOLIC BLOOD PRESSURE: 129 MMHG | HEIGHT: 70 IN | RESPIRATION RATE: 16 BRPM | DIASTOLIC BLOOD PRESSURE: 58 MMHG | BODY MASS INDEX: 26.2 KG/M2 | TEMPERATURE: 97.7 F | WEIGHT: 183 LBS | HEART RATE: 65 BPM | OXYGEN SATURATION: 95 %

## 2024-03-27 DIAGNOSIS — Z45.2 ENCOUNTER FOR CARE RELATED TO VASCULAR ACCESS PORT: Primary | ICD-10-CM

## 2024-03-27 PROCEDURE — 25010000002 HEPARIN LOCK FLUSH PER 10 UNITS: Performed by: INTERNAL MEDICINE

## 2024-03-27 RX ORDER — HEPARIN SODIUM (PORCINE) LOCK FLUSH IV SOLN 100 UNIT/ML 100 UNIT/ML
500 SOLUTION INTRAVENOUS AS NEEDED
Status: DISCONTINUED | OUTPATIENT
Start: 2024-03-27 | End: 2024-03-27 | Stop reason: HOSPADM

## 2024-03-27 RX ORDER — SODIUM CHLORIDE 0.9 % (FLUSH) 0.9 %
10 SYRINGE (ML) INJECTION AS NEEDED
OUTPATIENT
Start: 2024-03-27

## 2024-03-27 RX ORDER — SODIUM CHLORIDE 0.9 % (FLUSH) 0.9 %
10 SYRINGE (ML) INJECTION AS NEEDED
Status: DISCONTINUED | OUTPATIENT
Start: 2024-03-27 | End: 2024-03-27 | Stop reason: HOSPADM

## 2024-03-27 RX ORDER — HEPARIN SODIUM (PORCINE) LOCK FLUSH IV SOLN 100 UNIT/ML 100 UNIT/ML
500 SOLUTION INTRAVENOUS AS NEEDED
OUTPATIENT
Start: 2024-03-27

## 2024-03-27 RX ADMIN — Medication 10 ML: at 12:04

## 2024-03-27 RX ADMIN — Medication 500 UNITS: at 12:04

## 2024-04-04 NOTE — PROGRESS NOTES
"[facility] ON[facility] HEMATOLOGY & ONCOLOGY  [address]  [place] [address] [phone]    Patient Name: Juliocesar Tellez  Encounter Date: 04/10/2024  YOB: 1932  Patient Number: [id number]    REASON FOR VISIT: Juliocesar Tellez is a 89 yoM who returns in follow-up of stage III rectal adenocarcinoma.  He is currently receiving neoadjuvant FOLFOX- C1, 12/12/23; C2, 1/3/24; C3, 1/24/24; C4, 2/28/24; C5, 3/25/24-C6, 4/8/24 withheld due to thrombocytopenia. He is here with his spouse, Cristina.    I have reviewed the HPI and verified with the patient the accuracy of it. No changes to interval history since the information was documented. Damian Abraham MD 04/10/24      Diagnostic abnormalities:  --medical history includes diabetes, SARAHI, arthritis, BPH, hyperlipidemia, rectal bleeding and recently diagnosed rectal adenocarcinoma.  --9/18/23- Hgb 12.9 (pcp office)  --10/10/23-referred by Dr. Gerardo Prescott to GI for bright red rectal bleeding x 1 month.  No associated rectal pain, no weight loss no abdominal pain, no lightheadedness, dizziness nor black stool.  --11/9/23- Colonoscopy:  Rectal mass.  Malignant partially obstructing tumor from 4 to 11 cm proximal to the anus.  Biopsied.  Exam otherwise normal on direct and retroflexion views.  Final diagnosis:Large intestine, designated \"rectal mass\".  Invasive adenocarcinoma, well to moderately differentiated.  MSI by PCR: MSI-stable (LORENZO)  --11/20/23- Today the patient is seen for management considerations.  Sodium is 146 otherwise normal CMP, ferritin 24 (L), iron 46 (L), iron saturation 13% (L), B12 380, folate 19.7, CEA 15 (H), Hgb 12.1, MCV 93.3, platelets 196,000, WBC 8.67.  --12/1/23-CT chest-No definite evidence of metastatic disease in the chest. There is a 7 mm nodular density along the right minor fissure which is likely an intrafissural lymph node or small area of atelectasis/scarring. " However, recommend special attention on follow-up imaging to exclude a pulmonary nodule. Advanced pulmonary fibrosis, UIP pattern.  Mild bilateral hilar lymphadenopathy, likely reactive.   --12/1/23- CT abdomen/pelvis-  Thickening of the wall of the rectum consistent with the history of  rectal neoplasm. The outer wall of the rectum is somewhat ill-defined. Therefore, there may be some extension through the wall. In addition, the fat plane between the rectum and prostate is obscured suggesting possible extension of tumor into this area. There is a 5 mm lymph node in the left perirectal fat image 71 series 3. There is an additional small lymph node more superiorly in the pelvic mesenteric fat measuring 6-7 mm in short axis diameter. No other evidence of metastatic disease in the abdomen or pelvis. Bilateral inguinal hernias. The hernia on the right contains a portion of the bladder wall and peritoneal fat. The hernia on the left contains peritoneal fat. There is thickening of the bladder wall. The bladder is under distended. Wall thickening may be due to muscular hypertrophy as there is enlargement of the prostate. Artifact of under distention, infection  and inflammation are considered. Gallstones in the gallbladder.  --12/5/23- flexible sigmoidoscopy with rectal EUS, Dr. Calderon Cano  Known rectal mass was 80% circumferential and invasive appearing with ulceration extending from 4 cm to 14 cm  Endosonography revealed an irregular appearing heterogeneous mass measuring over 4.4 cm in greatest dimension with evidence of invasion through and obliteration of the muscularis propria.  Irregular borders were noted with questionable spread through the rectal wall.  The lesion did not appear to invade the prostate.  No definite lymphadenopathy was appreciated.    Previous interventions:  -- Venofer  mg- 12/4/23; 12/11/23; 12/18/23; 12/27/23  -- Neoadjuvant FOLFOX- C1, 12/12/23; C2, 1/3/24; C3, 1/24/24; C4, 2/28/24;C5,  3/25/24; C6, 4/8/24.      PAST MEDICAL HISTORY:  ALLERGIES:  Allergies   Allergen Reactions    Sulfa Antibiotics Other (See Comments)     Flu like symptoms     CURRENT MEDICATIONS:  Outpatient Encounter Medications as of 4/10/2024   Medication Sig Dispense Refill    acetaminophen (Tylenol) 325 MG tablet Take 3 tablets by mouth Every 8 (Eight) Hours. Take every 8 hours for 3 days then take prn as needed.      aspirin 81 MG EC tablet Take 1 tablet by mouth Daily.      atorvastatin (LIPITOR) 40 MG tablet Take 1 tablet by mouth Daily.      azelastine (ASTELIN) 0.1 % nasal spray 2 sprays into the nostril(s) as directed by provider 2 (Two) Times a Day. Use in each nostril as directed      cetirizine (zyrTEC) 10 MG tablet Take 1 tablet by mouth Daily.      dicyclomine (BENTYL) 10 MG capsule Take 1 capsule by mouth 4 (Four) Times a Day Before Meals & at Bedtime.      HumaLOG Mix 75/25 KwikPen (75-25) 100 UNIT/ML suspension pen-injector pen       insulin lispro protamine-insulin lispro (humaLOG 75-25) (75-25) 100 UNIT/ML suspension injection Inject  under the skin into the appropriate area as directed Take As Directed. 60 MORNING 35 EVENING      ketoconazole (NIZORAL) 2 % cream Apply 1 Application topically to the appropriate area as directed Daily.      ketoconazole (NIZORAL) 2 % cream Apply 1 Application topically to the appropriate area as directed Daily.      lidocaine-prilocaine (EMLA) 2.5-2.5 % cream 30 minutes prior to access apply generous amount of Emla Cream to port site and cover with clear plastic wrap until ready for use 1 each 2    lisinopril (PRINIVIL,ZESTRIL) 2.5 MG tablet Take 1 tablet by mouth Daily.      magnesium oxide (MAGOX) 400 (241.3 Mg) MG tablet tablet Take 1 tablet by mouth Daily.      Menthol-Zinc Oxide (Calmoseptine) 0.44-20.6 % ointment Apply 1 Application topically to the appropriate area as directed 2 (Two) Times a Day.      Olopatadine HCl (PATADAY OP) Apply  to eye(s) as directed by  "provider.      ondansetron (Zofran) 8 MG tablet Take 1 tablet by mouth Every 8 (Eight) Hours As Needed for Nausea or Vomiting. 30 tablet 2    tamsulosin (FLOMAX) 0.4 MG capsule 24 hr capsule TAKE ONE CAPSULE DAILY      timolol (BLOCADREN) 5 MG tablet Take 1 tablet by mouth 2 (Two) Times a Day.      TRAVOPROST OP Apply  to eye(s) as directed by provider.      prochlorperazine (COMPAZINE) 10 MG tablet Take 1 tablet by mouth Every 6 (Six) Hours As Needed for Nausea or Vomiting. (Patient not taking: Reported on 4/10/2024) 60 tablet 1    [DISCONTINUED] NovoLOG Mix 70/30 (70-30) 100 UNIT/ML injection Inject 25 Units under the skin into the appropriate area as directed. (Patient not taking: Reported on 4/10/2024)       No facility-administered encounter medications on file as of 4/10/2024.     Adult illnesses:  Arthritis  BPH  Diabetes  Hemorrhoids  Hyperlipidemia  SARAHI  Primary open-angle glaucoma  Pseudophakia  Rectal bleeding  Rectal carcinoma    Past surgeries:  Tonsillectomy  Colonoscopy with 8 polyps, tubular adenoma, few diverticula, 12/31/2013  Colonoscopy, 11/9/23-rectal mass.  Malignant partially obstructing tumor from 4 to 11 cm proximal to the anus.  Biopsied.  Exam otherwise normal on direct and retroflexion views.  Final diagnosis:Large intestine, designated \"rectal mass\": -Invasive adenocarcinoma, well to moderately differentiated  Port-A-Cath placement in the right cephalic vein, 11/29/23-Dr. Ortiz    ADULT ILLNESSES:  Patient Active Problem List   Diagnosis Code    Chest pain, atypical R07.89    Diabetes mellitus E11.9    Elevated blood pressure reading without diagnosis of hypertension R03.0    BRBPR (bright red blood per rectum) K62.5    Rectal adenocarcinoma C20    Iron deficiency anemia D50.9    Encounter for care related to vascular access port Z45.2    Non-smoker Z78.9     SURGERIES:  Past Surgical History:   Procedure Laterality Date    COLONOSCOPY  12/31/2013    8 polyps, tubular adenoma, a few " "divertucula    COLONOSCOPY N/A 11/09/2023    Procedure: SIGMOIDOSCOPY FLEXIBLE;  Surgeon: Terry Malik MD;  Location: [facility] ENDOSCOPY;  Service: Gastroenterology;  Laterality: N/A;  preop; BRBPR  postop rectal mass  PCP Gerardo Prescott    TONSILLECTOMY      VENOUS ACCESS DEVICE (PORT) INSERTION N/A 11/29/2023    Procedure: Single Lumen Port-a-cath insertion with flouroscopy;  Surgeon: Marquita Ortiz MD;  Location: [facility] OR;  Service: General;  Laterality: N/A;     HEALTH MAINTENANCE ITEMS:  Health Maintenance Due   Topic Date Due    URINE MICROALBUMIN  Never done    Pneumococcal Vaccine 65+ (1 of 2 - PCV) Never done    DIABETIC EYE EXAM  Never done    RSV Vaccine - Adults (1 - 1-dose 60+ series) Never done    ZOSTER VACCINE (2 of 3) 03/31/2014    LIPID PANEL  01/22/2019    ANNUAL WELLNESS VISIT  Never done    HEMOGLOBIN A1C  07/15/2020    TDAP/TD VACCINES (2 - Tdap) 02/21/2022    COVID-19 Vaccine (1 - 2023-24 season) Never done       <no information>  Last Completed Colonoscopy       This patient has no relevant Health Maintenance data.            There is no immunization history on file for this patient.  Last Completed Mammogram       This patient has no relevant Health Maintenance data.              FAMILY HISTORY:  Family History   Problem Relation Age of Onset    No Known Problems Mother     No Known Problems Father     Liver cancer Sister     Colon cancer Neg Hx      SOCIAL HISTORY:  Social History     Socioeconomic History    Marital status:    Tobacco Use    Smoking status: Never    Smokeless tobacco: Never   Vaping Use    Vaping status: Never Used   Substance and Sexual Activity    Alcohol use: Never    Drug use: Never    Sexual activity: Defer       REVIEW OF SYSTEMS:  Review of Systems   Constitutional:  Positive for appetite change (\"less on chemo\". Declines appetite stimulant). Negative for activity change (\"about the same'), fatigue and unexpected weight loss.        Manages his " "ADLs, to include chores, \"some\" but not running errands and no longer drives. Is up and about, \"more than 50%.\" Ambulates with a cane at home    Chemo tolerance:  Fatigue.  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  \"Not really.\" Soft stools without overt diarrhea.  Uses Imodium sparingly, \"once a day.\"  No mouth sores, no nausea, no vomiting, no skin changes.   HENT: Negative.     Eyes: Negative.    Respiratory:  Negative for shortness of breath (Baseline exertional dyspnea but no SOB with routine activities).         SARAHI NOT on CPAP   Cardiovascular: Negative.    Gastrointestinal:  Positive for diarrhea (Loose stools particularly at night ~ 2-3x/day.  Uses Imodium sparingly.  Uses liners). Negative for abdominal distention, abdominal pain, anal bleeding, blood in stool (with BMs. \"not like it was before\"), constipation, nausea, rectal pain and vomiting.   Endocrine: Negative.    Genitourinary: Negative.    Musculoskeletal:  Positive for arthralgias (Chronic pains in hands, right ankle (prior injury)neck and lower back), back pain (Chronic), myalgias (with ambulation, \"50 feet\") and neck stiffness (Chronic \"stiffness\"). Negative for neck pain.   Skin: Negative.    Neurological: Negative.    Hematological: Negative.    Psychiatric/Behavioral: Negative.         /64   Pulse 87   Temp 98.4 °F (36.9 °C) (Temporal)   Resp 18   Ht 177.8 cm (70\")   Wt 83.6 kg (184 lb 3.2 oz)   SpO2 93%   BMI 26.43 kg/m²  Body surface area is 2.02 meters squared.  Pain Score    04/10/24 0903   PainSc: 0-No pain       I have reexamined the patient and the results are consistent with the previously documented exam. Damian Abraham MD      Physical Exam  Vitals and nursing note reviewed.   Constitutional:       Comments: Pleasant, cooperative, heavyset middle-aged elderly male.  Brought in by wheelchair but ambulatory in the room.  ECOG 1-2 (same).      No " "weight changes (had regained 1 lb at his prior visit) since the last visit   HENT:      Head: Normocephalic and atraumatic.   Eyes:      General: No scleral icterus.     Extraocular Movements: Extraocular movements intact.      Pupils: Pupils are equal, round, and reactive to light.   Cardiovascular:      Rate and Rhythm: Normal rate.   Pulmonary:      Effort: Pulmonary effort is normal.      Comments: Port in the right upper chest is well seated  Abdominal:      Palpations: Abdomen is soft. There is no mass (RUQ fullness/hepatomegaly?).      Tenderness: There is no abdominal tenderness.   Musculoskeletal:         General: Swelling present. Normal range of motion.      Cervical back: Normal range of motion.      Right lower leg: Edema (1+ (chronic)- wearing an ankle brace for prior ankle injury) present.      Left lower leg: Edema (1+ (chronic)) present.   Lymphadenopathy:      Cervical: No cervical adenopathy.   Skin:     General: Skin is warm.   Neurological:      General: No focal deficit present.      Mental Status: He is alert and oriented to person, place, and time.   Psychiatric:         Mood and Affect: Mood normal.         Behavior: Behavior normal.         Thought Content: Thought content normal.           Assessment:   Rectal adenocarcinoma  --Original tumor stage:  TNM III (cT3, cN1, M0). MSI by PCR: MSI-stable (LORENZO)  --Original tumor burden:  --11/9/23- Colonoscopy:  Rectal mass.  Malignant partially obstructing tumor from 4 to 11 cm proximal to the anus.  Biopsied.  Exam otherwise normal on direct and retroflexion views.  Final diagnosis:Large intestine, designated \"rectal mass\".  Invasive adenocarcinoma, well to moderately differentiated.  --12/1/23- CT CAP- No mets in chest.  Thickening of the wall of the rectum consistent with the history of rectal neoplasm. The outer wall of the rectum is somewhat ill-defined. Therefore, there may be some extension through the wall. In addition, the fat plane " between the rectum and prostate is obscured suggesting possible extension of tumor into this area. There is a 5 mm lymph node in the left perirectal fat image 71 series 3. There is an additional small lymph node more superiorly in the pelvic mesenteric fat measuring 6-7 mm in short axis diameter. No other evidence of mets.  --12/5/23- flexible sigmoidoscopy with rectal EUS, Dr. Calderon Cano  Known rectal mass was 80% circumferential and invasive appearing with ulceration extending from 4 cm to 14 cm  Endosonography revealed an irregular appearing heterogeneous mass measuring over 4.4 cm in greatest dimension with evidence of invasion through and obliteration of the muscularis propria.  Irregular borders were noted with questionable spread through the rectal wall.  The lesion did not appear to invade the prostate.  No definite lymphadenopathy was appreciated.    --Complications of tumor:  --Rectal bleeding, anemia  --Tumor status:  -- Neoadjuvant FOLFOX in progress  -  2/7/24- Consult [facility], Dr. Kelly-A/P: Mr Tellez is a 89 yo male being treated with chemotherapy for a T3N0M0 rectal cancer. I am not able to scope him today since he is in the middle of his chemotherapy. He was scheduled to come see me however cancelled the appt thinking it was not necessary at that time. I have explained to him the importance of me examining him prior to completion of his treatment so as to better plan if surgery will be necessary and if it is what surgery would be needed. I will have him come back after he has completed his chemotherapy but before he starts his radiation treatment. He has voiced understanding of the importance of this and will be sure to come to his next appt.   PLAN  Contact Dr Abraham to see when is will chemo be completed  Plan for rtc after completion of chemo but before starting xrt for flex sig  Will see back then again after he has completed his xrt.     2.   Anemia. Iron deficiency from rectal  bleeding/malignancy  --11/20/23- fe 46 (L), fe sat 13% (L), ferritin 24 (L)  --Venofer x 4-12/4-12/27/23  --Hgb 13; MCV 95.5, 4/8/24 (prior linn:  Hgb 11.4)  3.   Thrombocytopenia. Chemo  4.   Diabetes on insulin  5.   Arthritis  6.   BPH  7.   SARAHI  8.   Advanced age  9.   Borderline PS (ECOG 1-2)    Plan:  Apprised of labs, 4/8/24 with stable anemia and platelets 91,000 otherwise normal CBC, glucose 86, sodium 144 otherwise essentially normal CMP  Note MMR could not be done due to lack of material.  While MSI by PCR: MSI-stable (LORENZO)  Chemo tolerance.  Interval delays due to cytopenias.  Grade 1 diarrhea/neuropathy- needs diarrhea teaching sheets  Review consult by Dr. Kelly of [facility], 2/7/24 (above). Plan: RTC after completion of chemo but before starting xrt for flex sig. Will see back then again after he has completed his xrt.   Review consult, 12/6/23- Dr. Su of rad onc-, I agree with total neoadjuvant therapy with chemo planned to be delivered under Dr. Craft's care, subsequent concurrent chemo-radiation to the pelvis, I anticipate a dose of 5040 cGy over 28 fractions with referral to surgeon post completion of therapy.   Previously reviewed consult, 12/5/23- Dr. Calderon Cano of GI. Flex sig (above).  No definite adenopathy  Review NCCN guidelines 2.2023 rectal cancer-for clinical stage T4, N any - pMMR/LORENZO- primary treatment - total neoadjuvant therapy.  FOLFOX or [place] ox- 12-16 weeks)-long course chemo/RT with capecitabine or infusional 8-[id number] (best tumor response 8 weeks after completion of RT)- reassess for surgery (Transabdominal resection or of CR consider observation.  If resection contraindciated-systemic therapy; If dMMR/MSI-H- primary treatment- NEOADJUVANT/DEFINITIVE IMMUNOTHERAPY (PREFERRED)- IO therapy for up to 6 months (nivolumab or pembrolizumab); OR Total neoadjuvant therapy (as above)  Discuss with patient: FOLFOX (5-FU and oxaliplatin) chemotherapy (to include but not  limited to: Myelosuppression (leukopenia, anemia, thrombocytopenia), agranulocytosis, hemorrhage, GI bleed, stomatitis, coronary vasospasm, myocardial ischemia, anaphylaxis, nausea, vomiting, diarrhea, anorexia, hand-foot syndrome, alopecia, dermatitis, photosensitivity, skin ulcers, acute cerebellar syndrome, headache, confusion, ocular irritation, hyperlacrimation, nail discoloration; oxaliplatin: Hypersensitivity reactions, anaphylaxis, laryngospasm, thromboembolism, myelosuppression (pancytopenia), hemolytic uremic syndrome, pulmonary fibrosis, interstitial lung disease, hepatotoxicity, pancreatitis, ileus, GI bleed, nausea/vomiting, severe diarrhea, metabolic acidosis, acute renal failure, reversible posterior leukoencephalopathy syndrome, peripheral neuropathy, optic neuritis, deafness, seizures, stomatitis, liver enzyme abnormalities, anorexia, cough, constipation, belly pain, fever, infection, dyspnea, epistaxis, edema, taste changes, myalgias, hyperglycemia, headache, insomnia, dyspepsia, back pain, injection site reaction, weight loss, peripheral edema, pharyngolaryngeal dysesthesia).  Questions answered.  He wishes to press on     9.   Schedule treatment - C6, 4/15/24 at [facility]:  To be administered every 2 weeks x 6 cycles- due to age, comorbidities and PS will dose reduce 20% up front from C1     BSA =    D1:   Oxaliplatin 85 mg/m2 IVPB over 2 hours (TD =  mg).   Precede and follow Oxaliplatin with: Ca Gluconate 2 gm and MgSO4 2 gm in 250 mL D5W IVPB over 30 min.   Leucovorin 400 mg/m2 IVPB over 2 hours (TD= mg).   5- mg/ m2 IVP (TD =  mg).   Begin 5-FU 2400? mg/m2 IVPB over 46 hours. (TD=  mg).                --Premedicate with:   Aloxi 0.25 mg IV   Emend 130 mg IV   Decadron 10 mg IV      10.  Upon initiation of chemo:  CMP and CBC weekly, with Procrit 40,000 if hemoglobin less than 10 and hematocrit less than 30 and Zarxio/Neupogen 480 mcg subcutaneously x5 days if ANC less than 1.0 at [facility]     11.  Rx:   Zofran 8 mg p.o. 3 times daily #30   Imodium 2 mg po as directed       12.  Reappoint to Dr. Kelly after C6 chemo on 4/15/24  13.  Reappoint to Dr. Barber Re:  Assess for neoadjuvant RT +5-FU-2nd request  14.  Return to office in 5 weeks with preoffice CBC, diff and CMP- Will schedule CIV 5-FU to be coordinated with radiation and after he is seen by Dr. Kelly    I spent ~40 minutes caring for Juliocesar on this date of service. This time includes time spent by me in the following activities: preparing for the visit, reviewing tests, performing a medically appropriate examination and/or evaluation, counseling and educating the patient/family/caregiver, ordering medications, tests, or procedures and documenting information in the medical record.

## 2024-04-08 ENCOUNTER — INFUSION (OUTPATIENT)
Dept: ONCOLOGY | Facility: HOSPITAL | Age: 89
End: 2024-04-08
Payer: MEDICARE

## 2024-04-08 ENCOUNTER — LAB (OUTPATIENT)
Dept: LAB | Facility: HOSPITAL | Age: 89
End: 2024-04-08
Payer: MEDICARE

## 2024-04-08 ENCOUNTER — TELEPHONE (OUTPATIENT)
Dept: ONCOLOGY | Facility: CLINIC | Age: 89
End: 2024-04-08
Payer: MEDICARE

## 2024-04-08 VITALS
WEIGHT: 183 LBS | DIASTOLIC BLOOD PRESSURE: 58 MMHG | TEMPERATURE: 97.7 F | HEIGHT: 70 IN | BODY MASS INDEX: 26.2 KG/M2 | OXYGEN SATURATION: 92 % | HEART RATE: 81 BPM | RESPIRATION RATE: 20 BRPM | SYSTOLIC BLOOD PRESSURE: 136 MMHG

## 2024-04-08 DIAGNOSIS — C20 RECTAL ADENOCARCINOMA: ICD-10-CM

## 2024-04-08 DIAGNOSIS — C20 RECTAL ADENOCARCINOMA: Primary | ICD-10-CM

## 2024-04-08 LAB
ALBUMIN SERPL-MCNC: 3.5 G/DL (ref 3.5–5.2)
ALBUMIN/GLOB SERPL: 1.1 G/DL
ALP SERPL-CCNC: 108 U/L (ref 39–117)
ALT SERPL W P-5'-P-CCNC: 32 U/L (ref 1–41)
ANION GAP SERPL CALCULATED.3IONS-SCNC: 6 MMOL/L (ref 5–15)
AST SERPL-CCNC: 35 U/L (ref 1–40)
BASOPHILS # BLD AUTO: 0.02 10*3/MM3 (ref 0–0.2)
BASOPHILS NFR BLD AUTO: 0.3 % (ref 0–1.5)
BILIRUB SERPL-MCNC: 0.6 MG/DL (ref 0–1.2)
BUN SERPL-MCNC: 13 MG/DL (ref 8–23)
BUN/CREAT SERPL: 15.5 (ref 7–25)
CALCIUM SPEC-SCNC: 9 MG/DL (ref 8.2–9.6)
CHLORIDE SERPL-SCNC: 106 MMOL/L (ref 98–107)
CO2 SERPL-SCNC: 32 MMOL/L (ref 22–29)
CREAT SERPL-MCNC: 0.84 MG/DL (ref 0.76–1.27)
DEPRECATED RDW RBC AUTO: 49.5 FL (ref 37–54)
EGFRCR SERPLBLD CKD-EPI 2021: 82.3 ML/MIN/1.73
EOSINOPHIL # BLD AUTO: 0.19 10*3/MM3 (ref 0–0.4)
EOSINOPHIL NFR BLD AUTO: 2.6 % (ref 0.3–6.2)
ERYTHROCYTE [DISTWIDTH] IN BLOOD BY AUTOMATED COUNT: 15.1 % (ref 12.3–15.4)
GLOBULIN UR ELPH-MCNC: 3.2 GM/DL
GLUCOSE SERPL-MCNC: 86 MG/DL (ref 65–99)
HCT VFR BLD AUTO: 40.3 % (ref 37.5–51)
HGB BLD-MCNC: 13 G/DL (ref 13–17.7)
LYMPHOCYTES # BLD AUTO: 2.12 10*3/MM3 (ref 0.7–3.1)
LYMPHOCYTES NFR BLD AUTO: 29.2 % (ref 19.6–45.3)
MCH RBC QN AUTO: 30.8 PG (ref 26.6–33)
MCHC RBC AUTO-ENTMCNC: 32.3 G/DL (ref 31.5–35.7)
MCV RBC AUTO: 95.5 FL (ref 79–97)
MONOCYTES # BLD AUTO: 0.57 10*3/MM3 (ref 0.1–0.9)
MONOCYTES NFR BLD AUTO: 7.9 % (ref 5–12)
NEUTROPHILS NFR BLD AUTO: 4.33 10*3/MM3 (ref 1.7–7)
NEUTROPHILS NFR BLD AUTO: 59.7 % (ref 42.7–76)
PLATELET # BLD AUTO: 91 10*3/MM3 (ref 140–450)
PMV BLD AUTO: 9.9 FL (ref 6–12)
POTASSIUM SERPL-SCNC: 4.5 MMOL/L (ref 3.5–5.2)
PROT SERPL-MCNC: 6.7 G/DL (ref 6–8.5)
RBC # BLD AUTO: 4.22 10*6/MM3 (ref 4.14–5.8)
SODIUM SERPL-SCNC: 144 MMOL/L (ref 136–145)
WBC NRBC COR # BLD AUTO: 7.25 10*3/MM3 (ref 3.4–10.8)

## 2024-04-08 PROCEDURE — G0463 HOSPITAL OUTPT CLINIC VISIT: HCPCS

## 2024-04-08 PROCEDURE — 80053 COMPREHEN METABOLIC PANEL: CPT

## 2024-04-08 PROCEDURE — 36415 COLL VENOUS BLD VENIPUNCTURE: CPT

## 2024-04-08 PROCEDURE — 85025 COMPLETE CBC W/AUTO DIFF WBC: CPT

## 2024-04-08 NOTE — TELEPHONE ENCOUNTER
Caller: debbie capone    Relationship to patient: Emergency Contact    Best call back number: [phone]     Chief complaint: NEEDS CALL BACK FOR CLARIFICATION ASAP    Type of visit: FOLLOW UP    Requested date: PT APPT FOR 04/10/24 MIGHT POSSIBLE BE CANCELED AND THE PT WILL HAVE AND INFUSION ON 04/15/24  AND DISCONNECT ON THE 04/17/24, BUT THEY ARE SUPPOSED TO BE IN [place] TO SEE DR RICARDO ON THE 04/17/24 AND THEY ARE CONCERNED HOW THIS IS ALL GOING TO WORK. PLEASE CALL BACK TO DISCUSS AND ADVISE.      IS THE APPT ON 04/10/24 NEEDED? PLEASE ADVISE.

## 2024-04-10 ENCOUNTER — TELEPHONE (OUTPATIENT)
Dept: ONCOLOGY | Facility: CLINIC | Age: 89
End: 2024-04-10

## 2024-04-10 ENCOUNTER — OFFICE VISIT (OUTPATIENT)
Dept: ONCOLOGY | Facility: CLINIC | Age: 89
End: 2024-04-10
Payer: MEDICARE

## 2024-04-10 VITALS
WEIGHT: 184.2 LBS | HEART RATE: 87 BPM | RESPIRATION RATE: 18 BRPM | DIASTOLIC BLOOD PRESSURE: 64 MMHG | HEIGHT: 70 IN | BODY MASS INDEX: 26.37 KG/M2 | SYSTOLIC BLOOD PRESSURE: 134 MMHG | OXYGEN SATURATION: 93 % | TEMPERATURE: 98.4 F

## 2024-04-10 DIAGNOSIS — C20 RECTAL ADENOCARCINOMA: Primary | ICD-10-CM

## 2024-04-10 NOTE — TELEPHONE ENCOUNTER
Caller: debbie cpaone    Relationship to patient: Emergency Contact    Best call back number:     [phone]     Requested date: PT'S WIFE CALL DR RICARDO TO SEE ABOUT GETTING R/S, BUT THEY HAVE ADVISED HER THAT THE SOONEST APPT THEY HAVE IS 07/16/24 OR 07/17/24. PT WIFE IS CONCERNED ABOUT APPT BEING SO FAR AND AND WOULD LIKE TO LET DR DOUGHERTY KNOW AND FOR HIM TO CALL BACK TO ADVISE.     THEY ARE NOT SURE WHAT TO DO.     IS THERE ANOTHER SURGEON OR SOMEONE THAT COULD SEE THE PT SOONER?

## 2024-04-12 NOTE — TELEPHONE ENCOUNTER
I called and spoke with Ronel and let her know that we have left a message for Dr. Kelly's nurse to see about getting the appointment moved up sooner.

## 2024-04-15 ENCOUNTER — LAB (OUTPATIENT)
Dept: LAB | Facility: HOSPITAL | Age: 89
End: 2024-04-15
Payer: MEDICARE

## 2024-04-15 ENCOUNTER — TELEPHONE (OUTPATIENT)
Dept: ONCOLOGY | Facility: CLINIC | Age: 89
End: 2024-04-15
Payer: MEDICARE

## 2024-04-15 ENCOUNTER — INFUSION (OUTPATIENT)
Dept: ONCOLOGY | Facility: HOSPITAL | Age: 89
End: 2024-04-15
Payer: MEDICARE

## 2024-04-15 VITALS
TEMPERATURE: 98.1 F | SYSTOLIC BLOOD PRESSURE: 120 MMHG | DIASTOLIC BLOOD PRESSURE: 55 MMHG | RESPIRATION RATE: 18 BRPM | HEIGHT: 70 IN | WEIGHT: 184.5 LBS | OXYGEN SATURATION: 95 % | BODY MASS INDEX: 26.41 KG/M2 | HEART RATE: 84 BPM

## 2024-04-15 DIAGNOSIS — C20 RECTAL ADENOCARCINOMA: Primary | ICD-10-CM

## 2024-04-15 DIAGNOSIS — C20 RECTAL ADENOCARCINOMA: ICD-10-CM

## 2024-04-15 LAB
ALBUMIN SERPL-MCNC: 3.5 G/DL (ref 3.5–5.2)
ALBUMIN/GLOB SERPL: 1.1 G/DL
ALP SERPL-CCNC: 128 U/L (ref 39–117)
ALT SERPL W P-5'-P-CCNC: 41 U/L (ref 1–41)
ANION GAP SERPL CALCULATED.3IONS-SCNC: 6 MMOL/L (ref 5–15)
AST SERPL-CCNC: 42 U/L (ref 1–40)
BASOPHILS # BLD AUTO: 0.03 10*3/MM3 (ref 0–0.2)
BASOPHILS NFR BLD AUTO: 0.6 % (ref 0–1.5)
BILIRUB SERPL-MCNC: 0.5 MG/DL (ref 0–1.2)
BUN SERPL-MCNC: 12 MG/DL (ref 8–23)
BUN/CREAT SERPL: 15.4 (ref 7–25)
CALCIUM SPEC-SCNC: 8.8 MG/DL (ref 8.2–9.6)
CHLORIDE SERPL-SCNC: 105 MMOL/L (ref 98–107)
CO2 SERPL-SCNC: 33 MMOL/L (ref 22–29)
CREAT SERPL-MCNC: 0.78 MG/DL (ref 0.76–1.27)
DEPRECATED RDW RBC AUTO: 53.8 FL (ref 37–54)
EGFRCR SERPLBLD CKD-EPI 2021: 84.2 ML/MIN/1.73
EOSINOPHIL # BLD AUTO: 0.33 10*3/MM3 (ref 0–0.4)
EOSINOPHIL NFR BLD AUTO: 6.2 % (ref 0.3–6.2)
ERYTHROCYTE [DISTWIDTH] IN BLOOD BY AUTOMATED COUNT: 15.5 % (ref 12.3–15.4)
GLOBULIN UR ELPH-MCNC: 3.3 GM/DL
GLUCOSE SERPL-MCNC: 139 MG/DL (ref 65–99)
HCT VFR BLD AUTO: 40.7 % (ref 37.5–51)
HGB BLD-MCNC: 13.5 G/DL (ref 13–17.7)
IMM GRANULOCYTES # BLD AUTO: 0.04 10*3/MM3 (ref 0–0.05)
IMM GRANULOCYTES NFR BLD AUTO: 0.7 % (ref 0–0.5)
LYMPHOCYTES # BLD AUTO: 2.67 10*3/MM3 (ref 0.7–3.1)
LYMPHOCYTES NFR BLD AUTO: 49.9 % (ref 19.6–45.3)
MCH RBC QN AUTO: 31.5 PG (ref 26.6–33)
MCHC RBC AUTO-ENTMCNC: 33.2 G/DL (ref 31.5–35.7)
MCV RBC AUTO: 95.1 FL (ref 79–97)
MONOCYTES # BLD AUTO: 1.05 10*3/MM3 (ref 0.1–0.9)
MONOCYTES NFR BLD AUTO: 19.6 % (ref 5–12)
NEUTROPHILS NFR BLD AUTO: 1.23 10*3/MM3 (ref 1.7–7)
NEUTROPHILS NFR BLD AUTO: 23 % (ref 42.7–76)
NRBC BLD AUTO-RTO: 0 /100 WBC (ref 0–0.2)
PLATELET # BLD AUTO: 174 10*3/MM3 (ref 140–450)
PMV BLD AUTO: 9.3 FL (ref 6–12)
POTASSIUM SERPL-SCNC: 4.5 MMOL/L (ref 3.5–5.2)
PROT SERPL-MCNC: 6.8 G/DL (ref 6–8.5)
RBC # BLD AUTO: 4.28 10*6/MM3 (ref 4.14–5.8)
SODIUM SERPL-SCNC: 144 MMOL/L (ref 136–145)
WBC NRBC COR # BLD AUTO: 5.35 10*3/MM3 (ref 3.4–10.8)

## 2024-04-15 PROCEDURE — 85025 COMPLETE CBC W/AUTO DIFF WBC: CPT

## 2024-04-15 PROCEDURE — 36415 COLL VENOUS BLD VENIPUNCTURE: CPT

## 2024-04-15 PROCEDURE — 80053 COMPREHEN METABOLIC PANEL: CPT

## 2024-04-15 PROCEDURE — G0463 HOSPITAL OUTPT CLINIC VISIT: HCPCS

## 2024-04-15 NOTE — PROGRESS NOTES
Msg to hem/onc: Juliocesar Tellez 12/19/1932 here for Folfox, ANC 1.23 Alk Phos 128 AST 42. ok for treatment?     Per Dr. WOOD: hold treatment for 1 week. Scheduled new appointment for patient, agreeable to plan.

## 2024-04-15 NOTE — TELEPHONE ENCOUNTER
Caller: Jocelyne Brandt    Relationship: Emergency Contact - [facility]    Best call back number: [phone]    What is the best time to reach you: ASAP    Who are you requesting to speak with (clinical staff, provider,  specific staff member): DR. DOUGHERTY'S NURSE    What was the call regarding: JOCELYNE IS CALLING REGARDING PT'S CARE, HE WOULD LIKE TO KNOW IF THERE IS ANYTHING THAT CAN BE DONE TO GET PT'S PLATELET COUNT UP SO HE CAN GET INFUSION, PLEASE ADVISE.    Is it okay if the provider responds through MyChart: NO

## 2024-04-15 NOTE — TELEPHONE ENCOUNTER
Left Guero a detailed message that there is nothing to give for low plt all that we can do is wait for them to rise.

## 2024-04-16 NOTE — TELEPHONE ENCOUNTER
Discussed with Guero and let him know that Aida PLT count yesterday were normal. Tx was held due to liver function .

## 2024-04-18 RX ORDER — DIPHENHYDRAMINE HYDROCHLORIDE 50 MG/ML
50 INJECTION INTRAMUSCULAR; INTRAVENOUS AS NEEDED
Status: CANCELLED | OUTPATIENT
Start: 2024-04-22

## 2024-04-18 RX ORDER — FLUOROURACIL 50 MG/ML
320 INJECTION, SOLUTION INTRAVENOUS ONCE
Status: CANCELLED | OUTPATIENT
Start: 2024-04-22

## 2024-04-18 RX ORDER — PALONOSETRON 0.05 MG/ML
0.25 INJECTION, SOLUTION INTRAVENOUS ONCE
Status: CANCELLED | OUTPATIENT
Start: 2024-04-22

## 2024-04-18 RX ORDER — DEXTROSE MONOHYDRATE 50 MG/ML
20 INJECTION, SOLUTION INTRAVENOUS ONCE
Status: CANCELLED | OUTPATIENT
Start: 2024-04-22

## 2024-04-18 RX ORDER — FAMOTIDINE 10 MG/ML
20 INJECTION, SOLUTION INTRAVENOUS AS NEEDED
Status: CANCELLED | OUTPATIENT
Start: 2024-04-22

## 2024-04-22 ENCOUNTER — INFUSION (OUTPATIENT)
Dept: ONCOLOGY | Facility: HOSPITAL | Age: 89
End: 2024-04-22
Payer: MEDICARE

## 2024-04-22 ENCOUNTER — TELEPHONE (OUTPATIENT)
Dept: ONCOLOGY | Facility: CLINIC | Age: 89
End: 2024-04-22
Payer: MEDICARE

## 2024-04-22 ENCOUNTER — LAB (OUTPATIENT)
Dept: LAB | Facility: HOSPITAL | Age: 89
End: 2024-04-22
Payer: MEDICARE

## 2024-04-22 VITALS
SYSTOLIC BLOOD PRESSURE: 142 MMHG | BODY MASS INDEX: 26.69 KG/M2 | DIASTOLIC BLOOD PRESSURE: 76 MMHG | HEART RATE: 85 BPM | HEIGHT: 70 IN | WEIGHT: 186.4 LBS | RESPIRATION RATE: 18 BRPM | OXYGEN SATURATION: 93 % | TEMPERATURE: 98.3 F

## 2024-04-22 DIAGNOSIS — C20 RECTAL ADENOCARCINOMA: ICD-10-CM

## 2024-04-22 DIAGNOSIS — C20 RECTAL ADENOCARCINOMA: Primary | ICD-10-CM

## 2024-04-22 LAB
ALBUMIN SERPL-MCNC: 3.5 G/DL (ref 3.5–5.2)
ALBUMIN/GLOB SERPL: 1.1 G/DL
ALP SERPL-CCNC: 123 U/L (ref 39–117)
ALT SERPL W P-5'-P-CCNC: 47 U/L (ref 1–41)
ANION GAP SERPL CALCULATED.3IONS-SCNC: 6 MMOL/L (ref 5–15)
AST SERPL-CCNC: 47 U/L (ref 1–40)
BASOPHILS # BLD AUTO: 0.06 10*3/MM3 (ref 0–0.2)
BASOPHILS NFR BLD AUTO: 0.6 % (ref 0–1.5)
BILIRUB SERPL-MCNC: 0.4 MG/DL (ref 0–1.2)
BUN SERPL-MCNC: 14 MG/DL (ref 8–23)
BUN/CREAT SERPL: 15.2 (ref 7–25)
CALCIUM SPEC-SCNC: 8.6 MG/DL (ref 8.2–9.6)
CHLORIDE SERPL-SCNC: 105 MMOL/L (ref 98–107)
CO2 SERPL-SCNC: 33 MMOL/L (ref 22–29)
CREAT SERPL-MCNC: 0.92 MG/DL (ref 0.76–1.27)
DEPRECATED RDW RBC AUTO: 53.8 FL (ref 37–54)
EGFRCR SERPLBLD CKD-EPI 2021: 78.5 ML/MIN/1.73
EOSINOPHIL # BLD AUTO: 0.1 10*3/MM3 (ref 0–0.4)
EOSINOPHIL NFR BLD AUTO: 1 % (ref 0.3–6.2)
ERYTHROCYTE [DISTWIDTH] IN BLOOD BY AUTOMATED COUNT: 15.1 % (ref 12.3–15.4)
GLOBULIN UR ELPH-MCNC: 3.1 GM/DL
GLUCOSE SERPL-MCNC: 222 MG/DL (ref 65–99)
HCT VFR BLD AUTO: 41.3 % (ref 37.5–51)
HGB BLD-MCNC: 13.4 G/DL (ref 13–17.7)
LYMPHOCYTES # BLD AUTO: 2.27 10*3/MM3 (ref 0.7–3.1)
LYMPHOCYTES NFR BLD AUTO: 23 % (ref 19.6–45.3)
MCH RBC QN AUTO: 31.4 PG (ref 26.6–33)
MCHC RBC AUTO-ENTMCNC: 32.4 G/DL (ref 31.5–35.7)
MCV RBC AUTO: 96.7 FL (ref 79–97)
MONOCYTES # BLD AUTO: 0.75 10*3/MM3 (ref 0.1–0.9)
MONOCYTES NFR BLD AUTO: 7.6 % (ref 5–12)
NEUTROPHILS NFR BLD AUTO: 6.61 10*3/MM3 (ref 1.7–7)
NEUTROPHILS NFR BLD AUTO: 67.1 % (ref 42.7–76)
PLATELET # BLD AUTO: 163 10*3/MM3 (ref 140–450)
PMV BLD AUTO: 9.1 FL (ref 6–12)
POTASSIUM SERPL-SCNC: 5.2 MMOL/L (ref 3.5–5.2)
PROT SERPL-MCNC: 6.6 G/DL (ref 6–8.5)
RBC # BLD AUTO: 4.27 10*6/MM3 (ref 4.14–5.8)
SODIUM SERPL-SCNC: 144 MMOL/L (ref 136–145)
WBC NRBC COR # BLD AUTO: 9.86 10*3/MM3 (ref 3.4–10.8)

## 2024-04-22 PROCEDURE — 96368 THER/DIAG CONCURRENT INF: CPT

## 2024-04-22 PROCEDURE — 25010000002 DEXAMETHASONE SODIUM PHOSPHATE 100 MG/10ML SOLUTION: Performed by: INTERNAL MEDICINE

## 2024-04-22 PROCEDURE — 96375 TX/PRO/DX INJ NEW DRUG ADDON: CPT

## 2024-04-22 PROCEDURE — 25010000002 LEUCOVORIN CALCIUM PER 50 MG: Performed by: INTERNAL MEDICINE

## 2024-04-22 PROCEDURE — 96366 THER/PROPH/DIAG IV INF ADDON: CPT

## 2024-04-22 PROCEDURE — 25010000002 FLUOROURACIL PER 500 MG: Performed by: INTERNAL MEDICINE

## 2024-04-22 PROCEDURE — 85025 COMPLETE CBC W/AUTO DIFF WBC: CPT

## 2024-04-22 PROCEDURE — 96416 CHEMO PROLONG INFUSE W/PUMP: CPT

## 2024-04-22 PROCEDURE — 96367 TX/PROPH/DG ADDL SEQ IV INF: CPT

## 2024-04-22 PROCEDURE — 25010000002 OXALIPLATIN PER 0.5 MG: Performed by: INTERNAL MEDICINE

## 2024-04-22 PROCEDURE — 80053 COMPREHEN METABOLIC PANEL: CPT

## 2024-04-22 PROCEDURE — 0 DEXTROSE 5 % SOLUTION 250 ML FLEX CONT: Performed by: INTERNAL MEDICINE

## 2024-04-22 PROCEDURE — 96415 CHEMO IV INFUSION ADDL HR: CPT

## 2024-04-22 PROCEDURE — G0498 CHEMO EXTEND IV INFUS W/PUMP: HCPCS

## 2024-04-22 PROCEDURE — 0 DEXTROSE 5 % SOLUTION: Performed by: INTERNAL MEDICINE

## 2024-04-22 PROCEDURE — 25010000002 FOSAPREPITANT PER 1 MG: Performed by: INTERNAL MEDICINE

## 2024-04-22 PROCEDURE — 96413 CHEMO IV INFUSION 1 HR: CPT

## 2024-04-22 PROCEDURE — 25010000002 PALONOSETRON PER 25 MCG: Performed by: INTERNAL MEDICINE

## 2024-04-22 PROCEDURE — 36415 COLL VENOUS BLD VENIPUNCTURE: CPT

## 2024-04-22 RX ORDER — FAMOTIDINE 10 MG/ML
20 INJECTION, SOLUTION INTRAVENOUS AS NEEDED
Status: DISCONTINUED | OUTPATIENT
Start: 2024-04-22 | End: 2024-04-22 | Stop reason: HOSPADM

## 2024-04-22 RX ORDER — FLUOROURACIL 50 MG/ML
320 INJECTION, SOLUTION INTRAVENOUS ONCE
Status: COMPLETED | OUTPATIENT
Start: 2024-04-22 | End: 2024-04-22

## 2024-04-22 RX ORDER — PALONOSETRON 0.05 MG/ML
0.25 INJECTION, SOLUTION INTRAVENOUS ONCE
Status: COMPLETED | OUTPATIENT
Start: 2024-04-22 | End: 2024-04-22

## 2024-04-22 RX ORDER — DIPHENHYDRAMINE HYDROCHLORIDE 50 MG/ML
50 INJECTION INTRAMUSCULAR; INTRAVENOUS AS NEEDED
Status: DISCONTINUED | OUTPATIENT
Start: 2024-04-22 | End: 2024-04-22 | Stop reason: HOSPADM

## 2024-04-22 RX ORDER — DEXTROSE MONOHYDRATE 50 MG/ML
20 INJECTION, SOLUTION INTRAVENOUS ONCE
Status: COMPLETED | OUTPATIENT
Start: 2024-04-22 | End: 2024-04-22

## 2024-04-22 RX ADMIN — FLUOROURACIL 640 MG: 50 INJECTION, SOLUTION INTRAVENOUS at 11:44

## 2024-04-22 RX ADMIN — FOSAPREPITANT 150 MG: 150 INJECTION, POWDER, LYOPHILIZED, FOR SOLUTION INTRAVENOUS at 08:58

## 2024-04-22 RX ADMIN — DEXTROSE MONOHYDRATE 20 ML/HR: 50 INJECTION, SOLUTION INTRAVENOUS at 08:40

## 2024-04-22 RX ADMIN — DEXAMETHASONE SODIUM PHOSPHATE 12 MG: 10 INJECTION, SOLUTION INTRAMUSCULAR; INTRAVENOUS at 08:42

## 2024-04-22 RX ADMIN — FLUOROURACIL 3840 MG: 50 INJECTION, SOLUTION INTRAVENOUS at 11:52

## 2024-04-22 RX ADMIN — LEUCOVORIN CALCIUM 640 MG: 350 INJECTION, POWDER, LYOPHILIZED, FOR SUSPENSION INTRAMUSCULAR; INTRAVENOUS at 09:34

## 2024-04-22 RX ADMIN — OXALIPLATIN 135 MG: 5 INJECTION, SOLUTION, CONCENTRATE INTRAVENOUS at 09:31

## 2024-04-22 RX ADMIN — PALONOSETRON HYDROCHLORIDE 0.25 MG: 0.25 INJECTION, SOLUTION INTRAVENOUS at 08:40

## 2024-04-22 NOTE — TELEPHONE ENCOUNTER
"Message from RN with the [facility]\" Juliocesar Tellez [birth year] her for last FOLFOX could Dr. WOOD review labs to see if OK for tx today. Thanks    Message back from Dr. Abraham: estelita-Glucose 222-please confirm that he is receiving insulin prior to his chemo.  Otherwise okay for chemo Patient confirmed that he had taken his insulin this morning.         "

## 2024-04-22 NOTE — PROGRESS NOTES
Message sent to office for Dr. WOOD to review labs prior to treatment.    Labs noted per Dr. Abraham OK for treatment today. Patient did take his insulin as scheduled/. He rechecked his glucose with his Decom device and it is going down at present reading 192. Dr. Abraham aware.

## 2024-04-24 ENCOUNTER — INFUSION (OUTPATIENT)
Dept: ONCOLOGY | Facility: HOSPITAL | Age: 89
End: 2024-04-24
Payer: MEDICARE

## 2024-04-24 VITALS
BODY MASS INDEX: 26.75 KG/M2 | HEART RATE: 63 BPM | TEMPERATURE: 98.1 F | HEIGHT: 70 IN | RESPIRATION RATE: 20 BRPM | DIASTOLIC BLOOD PRESSURE: 57 MMHG | SYSTOLIC BLOOD PRESSURE: 127 MMHG | OXYGEN SATURATION: 96 %

## 2024-04-24 DIAGNOSIS — C20 RECTAL ADENOCARCINOMA: Primary | ICD-10-CM

## 2024-04-24 DIAGNOSIS — Z45.2 ENCOUNTER FOR CARE RELATED TO VASCULAR ACCESS PORT: ICD-10-CM

## 2024-04-24 PROCEDURE — 25010000002 HEPARIN LOCK FLUSH PER 10 UNITS: Performed by: INTERNAL MEDICINE

## 2024-04-24 RX ORDER — HEPARIN SODIUM (PORCINE) LOCK FLUSH IV SOLN 100 UNIT/ML 100 UNIT/ML
500 SOLUTION INTRAVENOUS AS NEEDED
OUTPATIENT
Start: 2024-04-24

## 2024-04-24 RX ORDER — HEPARIN SODIUM (PORCINE) LOCK FLUSH IV SOLN 100 UNIT/ML 100 UNIT/ML
500 SOLUTION INTRAVENOUS AS NEEDED
Status: DISCONTINUED | OUTPATIENT
Start: 2024-04-24 | End: 2024-04-24 | Stop reason: HOSPADM

## 2024-04-24 RX ORDER — SODIUM CHLORIDE 0.9 % (FLUSH) 0.9 %
10 SYRINGE (ML) INJECTION AS NEEDED
Status: DISCONTINUED | OUTPATIENT
Start: 2024-04-24 | End: 2024-04-24 | Stop reason: HOSPADM

## 2024-04-24 RX ORDER — SODIUM CHLORIDE 0.9 % (FLUSH) 0.9 %
10 SYRINGE (ML) INJECTION AS NEEDED
OUTPATIENT
Start: 2024-04-24

## 2024-04-24 RX ADMIN — Medication 500 UNITS: at 11:13

## 2024-04-24 RX ADMIN — Medication 10 ML: at 11:14

## 2024-05-06 ENCOUNTER — OFFICE VISIT (OUTPATIENT)
Age: 89
End: 2024-05-06
Payer: MEDICARE

## 2024-05-06 VITALS
HEART RATE: 75 BPM | WEIGHT: 185 LBS | OXYGEN SATURATION: 96 % | SYSTOLIC BLOOD PRESSURE: 183 MMHG | BODY MASS INDEX: 26.48 KG/M2 | HEIGHT: 70 IN | DIASTOLIC BLOOD PRESSURE: 73 MMHG

## 2024-05-06 DIAGNOSIS — C20 RECTAL ADENOCARCINOMA: Primary | ICD-10-CM

## 2024-05-13 NOTE — PROGRESS NOTES
"[facility] ON[facility] GROUP HEMATOLOGY & ONCOLOGY  [address]  [facility] [address] [phone]    Patient Name: Juliocesar Tellez  Encounter Date: 05/20/2024  YOB: 1932  Patient Number: [id number]    REASON FOR VISIT: Juliocesar Tellez is a 89 yoM who returns in follow-up of stage III rectal adenocarcinoma.  He is currently receiving neoadjuvant FOLFOX- C1, 12/12/23; C2, 1/3/24; C3, 1/24/24; C4, 2/28/24; C5, 3/25/24-C6, 4/22/24. He has been seen in follow-up by Dr. Kelly of CR surgery, 5/1/24 (below) and Dr. Barber of RT, 5/6/24 to start RT with concurrent CIV 5-FU.  He is here with his son, Vivek (previously with his spouse, Cristina).    I have reviewed the HPI and verified with the patient the accuracy of it. No changes to interval history since the information was documented. Damian Abraham MD 05/15/24        Diagnostic abnormalities:  --medical history includes diabetes, SARAHI, arthritis, BPH, hyperlipidemia, rectal bleeding and recently diagnosed rectal adenocarcinoma.  --9/18/23- Hgb 12.9 (pcp office)  --10/10/23-referred by Dr. Gerardo Prescott to GI for bright red rectal bleeding x 1 month.  No associated rectal pain, no weight loss no abdominal pain, no lightheadedness, dizziness nor black stool.  --11/9/23- Colonoscopy:  Rectal mass.  Malignant partially obstructing tumor from 4 to 11 cm proximal to the anus.  Biopsied.  Exam otherwise normal on direct and retroflexion views.  Final diagnosis:Large intestine, designated \"rectal mass\".  Invasive adenocarcinoma, well to moderately differentiated.  MSI by PCR: MSI-stable (LORENZO)  --11/20/23- Today the patient is seen for management considerations.  Sodium is 146 otherwise normal CMP, ferritin 24 (L), iron 46 (L), iron saturation 13% (L), B12 380, folate 19.7, CEA 15 (H), Hgb 12.1, MCV 93.3, platelets 196,000, WBC 8.67.  --12/1/23-CT chest-No definite evidence of metastatic disease in the chest. " There is a 7 mm nodular density along the right minor fissure which is likely an intrafissural lymph node or small area of atelectasis/scarring. However, recommend special attention on follow-up imaging to exclude a pulmonary nodule. Advanced pulmonary fibrosis, UIP pattern.  Mild bilateral hilar lymphadenopathy, likely reactive.   --12/1/23- CT abdomen/pelvis-  Thickening of the wall of the rectum consistent with the history of  rectal neoplasm. The outer wall of the rectum is somewhat ill-defined. Therefore, there may be some extension through the wall. In addition, the fat plane between the rectum and prostate is obscured suggesting possible extension of tumor into this area. There is a 5 mm lymph node in the left perirectal fat image 71 series 3. There is an additional small lymph node more superiorly in the pelvic mesenteric fat measuring 6-7 mm in short axis diameter. No other evidence of metastatic disease in the abdomen or pelvis. Bilateral inguinal hernias. The hernia on the right contains a portion of the bladder wall and peritoneal fat. The hernia on the left contains peritoneal fat. There is thickening of the bladder wall. The bladder is under distended. Wall thickening may be due to muscular hypertrophy as there is enlargement of the prostate. Artifact of under distention, infection  and inflammation are considered. Gallstones in the gallbladder.  --12/5/23- flexible sigmoidoscopy with rectal EUS, Dr. Calderon Cano  Known rectal mass was 80% circumferential and invasive appearing with ulceration extending from 4 cm to 14 cm  Endosonography revealed an irregular appearing heterogeneous mass measuring over 4.4 cm in greatest dimension with evidence of invasion through and obliteration of the muscularis propria.  Irregular borders were noted with questionable spread through the rectal wall.  The lesion did not appear to invade the prostate.  No definite lymphadenopathy was appreciated.    Previous  interventions:  -- Venofer  mg- 12/4/23; 12/11/23; 12/18/23; 12/27/23  -- Neoadjuvant FOLFOX- C1, 12/12/23; C2, 1/3/24; C3, 1/24/24; C4, 2/28/24;C5, 3/25/24; C6, 4/22/24.      PAST MEDICAL HISTORY:  ALLERGIES:  Allergies   Allergen Reactions    Sulfa Antibiotics Other (See Comments)     Flu like symptoms     CURRENT MEDICATIONS:  Outpatient Encounter Medications as of 5/15/2024   Medication Sig Dispense Refill    acetaminophen (Tylenol) 325 MG tablet Take 3 tablets by mouth Every 8 (Eight) Hours. Take every 8 hours for 3 days then take prn as needed.      aspirin 81 MG EC tablet Take 1 tablet by mouth Daily.      atorvastatin (LIPITOR) 40 MG tablet Take 1 tablet by mouth Daily.      azelastine (ASTELIN) 0.1 % nasal spray 2 sprays into the nostril(s) as directed by provider 2 (Two) Times a Day. Use in each nostril as directed      cetirizine (zyrTEC) 10 MG tablet Take 1 tablet by mouth Daily.      dicyclomine (BENTYL) 10 MG capsule Take 1 capsule by mouth 4 (Four) Times a Day Before Meals & at Bedtime.      HumaLOG Mix 75/25 KwikPen (75-25) 100 UNIT/ML suspension pen-injector pen       insulin lispro protamine-insulin lispro (humaLOG 75-25) (75-25) 100 UNIT/ML suspension injection Inject  under the skin into the appropriate area as directed Take As Directed. 60 MORNING 35 EVENING      ketoconazole (NIZORAL) 2 % cream Apply 1 Application topically to the appropriate area as directed Daily.      ketoconazole (NIZORAL) 2 % cream Apply 1 Application topically to the appropriate area as directed Daily.      lidocaine-prilocaine (EMLA) 2.5-2.5 % cream 30 minutes prior to access apply generous amount of Emla Cream to port site and cover with clear plastic wrap until ready for use 1 each 2    lisinopril (PRINIVIL,ZESTRIL) 2.5 MG tablet Take 1 tablet by mouth Daily.      magnesium oxide (MAGOX) 400 (241.3 Mg) MG tablet tablet Take 1 tablet by mouth Daily.      Menthol-Zinc Oxide (Calmoseptine) 0.44-20.6 % ointment Apply  "1 Application topically to the appropriate area as directed 2 (Two) Times a Day.      Olopatadine HCl (PATADAY OP) Apply  to eye(s) as directed by provider.      ondansetron (Zofran) 8 MG tablet Take 1 tablet by mouth Every 8 (Eight) Hours As Needed for Nausea or Vomiting. 30 tablet 2    prochlorperazine (COMPAZINE) 10 MG tablet Take 1 tablet by mouth Every 6 (Six) Hours As Needed for Nausea or Vomiting. 60 tablet 1    tamsulosin (FLOMAX) 0.4 MG capsule 24 hr capsule TAKE ONE CAPSULE DAILY      timolol (BLOCADREN) 5 MG tablet Take 1 tablet by mouth 2 (Two) Times a Day.      TRAVOPROST OP Apply  to eye(s) as directed by provider.       No facility-administered encounter medications on file as of 5/15/2024.     Adult illnesses:  Arthritis  BPH  Diabetes  Hemorrhoids  Hyperlipidemia  SARAHI  Primary open-angle glaucoma  Pseudophakia  Rectal bleeding  Rectal carcinoma    Past surgeries:  Tonsillectomy  Colonoscopy with 8 polyps, tubular adenoma, few diverticula, 12/31/2013  Colonoscopy, 11/9/23-rectal mass.  Malignant partially obstructing tumor from 4 to 11 cm proximal to the anus.  Biopsied.  Exam otherwise normal on direct and retroflexion views.  Final diagnosis:Large intestine, designated \"rectal mass\": -Invasive adenocarcinoma, well to moderately differentiated  Port-A-Cath placement in the right cephalic vein, 11/29/23-Dr. Ortiz    ADULT ILLNESSES:  Patient Active Problem List   Diagnosis Code    Chest pain, atypical R07.89    Diabetes mellitus E11.9    Elevated blood pressure reading without diagnosis of hypertension R03.0    BRBPR (bright red blood per rectum) K62.5    Rectal adenocarcinoma C20    Iron deficiency anemia D50.9    Encounter for care related to vascular access port Z45.2    Non-smoker Z78.9    Rectal cancer C20     SURGERIES:  Past Surgical History:   Procedure Laterality Date    COLONOSCOPY  12/31/2013    8 polyps, tubular adenoma, a few divertucula    COLONOSCOPY N/A 11/09/2023    Procedure: " "SIGMOIDOSCOPY FLEXIBLE;  Surgeon: Terry Malik MD;  Location: [facility] ENDOSCOPY;  Service: Gastroenterology;  Laterality: N/A;  preop; BRBPR  postop rectal mass  PCP Gerardo Prescott    TONSILLECTOMY      VENOUS ACCESS DEVICE (PORT) INSERTION N/A 11/29/2023    Procedure: Single Lumen Port-a-cath insertion with flouroscopy;  Surgeon: Marquita Ortiz MD;  Location: [facility] OR;  Service: General;  Laterality: N/A;     HEALTH MAINTENANCE ITEMS:  Health Maintenance Due   Topic Date Due    URINE MICROALBUMIN  Never done    Pneumococcal Vaccine 65+ (1 of 2 - PCV) Never done    DIABETIC EYE EXAM  Never done    RSV Vaccine - Adults (1 - 1-dose 60+ series) Never done    ZOSTER VACCINE (2 of 3) 03/31/2014    LIPID PANEL  01/22/2019    ANNUAL WELLNESS VISIT  Never done    HEMOGLOBIN A1C  07/15/2020    TDAP/TD VACCINES (2 - Tdap) 02/21/2022    COVID-19 Vaccine (1 - 2023-24 season) Never done       <no information>  Last Completed Colonoscopy       This patient has no relevant Health Maintenance data.            There is no immunization history on file for this patient.  Last Completed Mammogram       This patient has no relevant Health Maintenance data.              FAMILY HISTORY:  Family History   Problem Relation Age of Onset    No Known Problems Mother     No Known Problems Father     Liver cancer Sister     Colon cancer Neg Hx      SOCIAL HISTORY:  Social History     Socioeconomic History    Marital status:    Tobacco Use    Smoking status: Never    Smokeless tobacco: Never   Vaping Use    Vaping status: Never Used   Substance and Sexual Activity    Alcohol use: Never    Drug use: Never    Sexual activity: Defer       REVIEW OF SYSTEMS:  Review of Systems   Constitutional:  Positive for appetite change (\"less on chemo\". Declines appetite stimulant). Negative for activity change (\"about the same'), fatigue and unexpected weight loss.        Manages his ADLs, to include chores, \"some\" but not running errands " "and no longer drives. Is up and about, \"~50%.\" Ambulates with a cane at home    Chemo tolerance:  Fatigue.  \"Not anymore than usual.\" No cold associated neuropathy of the hands, but denies loss of fine motor function.  \"Not more than usual.\" Soft stools without overt diarrhea.  Uses Imodium sparingly, \"maybe once a day.\"  No mouth sores, no nausea, no vomiting, no skin changes.   HENT: Negative.     Eyes: Negative.    Respiratory:  Negative for shortness of breath (Baseline exertional dyspnea but no SOB with routine activities).         SARAHI NOT on CPAP   Cardiovascular: Negative.    Gastrointestinal:  Positive for diarrhea (Loose stools particularly at night ~ 2-3x/day.  Uses Imodium sparingly.  Uses liners). Negative for abdominal distention, abdominal pain, anal bleeding, blood in stool (with BMs. \"not like it was before\"), constipation, nausea, rectal pain and vomiting.   Endocrine: Negative.    Genitourinary: Negative.    Musculoskeletal:  Positive for arthralgias (Chronic pains in hands, right ankle (prior injury)neck and lower back), back pain (Chronic), myalgias (with ambulation, \"50 feet\") and neck stiffness (Chronic \"stiffness\"). Negative for neck pain.   Skin: Negative.    Neurological: Negative.    Hematological: Negative.    Psychiatric/Behavioral: Negative.         /72   Pulse 82   Temp 98.1 °F (36.7 °C) (Temporal)   Resp 18   Ht 177.8 cm (70\")   Wt 83.8 kg (184 lb 11.2 oz)   SpO2 92%   BMI 26.50 kg/m²  Body surface area is 2.02 meters squared.  Pain Score    05/15/24 0917   PainSc: 0-No pain         Physical Exam  Vitals and nursing note reviewed.   Constitutional:       Comments: Pleasant, cooperative, heavyset middle-aged elderly male.  Brought in by wheelchair but ambulatory in the room with a cane.  ECOG 1-2 (same).      No weight changes (no weight changes at his prior visit) since the last visit   HENT:      Head: Normocephalic and atraumatic.   Eyes:      General: No scleral " "icterus.     Extraocular Movements: Extraocular movements intact.      Pupils: Pupils are equal, round, and reactive to light.   Cardiovascular:      Rate and Rhythm: Normal rate.   Pulmonary:      Effort: Pulmonary effort is normal.      Comments: Port in the right upper chest is well seated  Abdominal:      Palpations: Abdomen is soft. There is no mass (RUQ fullness/hepatomegaly?).      Tenderness: There is no abdominal tenderness.   Musculoskeletal:         General: Swelling present. Normal range of motion.      Cervical back: Normal range of motion.      Right lower leg: Edema (1+ (chronic)- wearing an ankle brace for prior ankle injury) present.      Left lower leg: Edema (1+ (chronic)) present.   Lymphadenopathy:      Cervical: No cervical adenopathy.   Skin:     General: Skin is warm.   Neurological:      General: No focal deficit present.      Mental Status: He is alert and oriented to person, place, and time.   Psychiatric:         Mood and Affect: Mood normal.         Behavior: Behavior normal.         Thought Content: Thought content normal.           Assessment:   Rectal adenocarcinoma  --Original tumor stage:  TNM III (cT3, cN1, M0). MSI by PCR: MSI-stable (LORENZO)  --Original tumor burden:  --11/9/23- Colonoscopy:  Rectal mass.  Malignant partially obstructing tumor from 4 to 11 cm proximal to the anus.  Biopsied.  Exam otherwise normal on direct and retroflexion views.  Final diagnosis:Large intestine, designated \"rectal mass\".  Invasive adenocarcinoma, well to moderately differentiated.  --12/1/23- CT CAP- No mets in chest.  Thickening of the wall of the rectum consistent with the history of rectal neoplasm. The outer wall of the rectum is somewhat ill-defined. Therefore, there may be some extension through the wall. In addition, the fat plane between the rectum and prostate is obscured suggesting possible extension of tumor into this area. There is a 5 mm lymph node in the left perirectal fat image 71 " series 3. There is an additional small lymph node more superiorly in the pelvic mesenteric fat measuring 6-7 mm in short axis diameter. No other evidence of mets.  --12/5/23- flexible sigmoidoscopy with rectal EUS, Dr. Calderon Cano  Known rectal mass was 80% circumferential and invasive appearing with ulceration extending from 4 cm to 14 cm  Endosonography revealed an irregular appearing heterogeneous mass measuring over 4.4 cm in greatest dimension with evidence of invasion through and obliteration of the muscularis propria.  Irregular borders were noted with questionable spread through the rectal wall.  The lesion did not appear to invade the prostate.  No definite lymphadenopathy was appreciated.    --Complications of tumor:  --Rectal bleeding, anemia  --Tumor status:  -- Neoadjuvant FOLFOX - completed C6, 4/22/24  -  2/7/24- Consult CRC, Dr. Way/P: Mr Tellez is a 89 yo male being treated with chemotherapy for a T3N0M0 rectal cancer. I am not able to scope him today since he is in the middle of his chemotherapy. He was scheduled to come see me however cancelled the appt thinking it was not necessary at that time. I have explained to him the importance of me examining him prior to completion of his treatment so as to better plan if surgery will be necessary and if it is what surgery would be needed. I will have him come back after he has completed his chemotherapy but before he starts his radiation treatment. He has voiced understanding of the importance of this and will be sure to come to his next appt.   PLAN  Contact Dr Abraham to see when is will chemo be completed  Plan for rtc after completion of chemo but before starting xrt for flex sig  Will see back then again after he has completed his xrt.   - 5/1/24- Follow-up Dr. Way/P: Mr Tellez is a 89 yo male who has completed his chemotherapy for treatment of his rectal cancer and is here today for evaluation. I have not seen the tumor as he missed  his appt that was made for him prior to starting treatment. When I saw him last he was in the middle of chemo so not advisable to do a scope at that time. He is now between his chemo and xrt treatments. Exam today showed a large mass, not obstructing, extending from the left posterior quadrant , around the anterior midline over to the right anterior quadrant. He will start his chemo /xrt soon. At this time, the lesion is not involving his sphincters however, it would probably be in his best interest to do an APR should a surgery be considered. If continuity was restored he would require an DLI and that would then require a second operation in a 89 yo patient which would not be advisable. If he has a very good response, possibly no surgery would be needed.   Plan  Patient to return one month after completion of his chemo/xrt.   - 5/6/24- Follow-up Dr. Tim Tellez returns to the clinic today to begin radiation therapy for Stage III (cT3 cN1 M0), well to moderately differentiated rectal adenocarcinoma. This time I recommend a course of pelvic radiation along with low-dose chemotherapy with continuous infusion 5-FU.  I anticipate a dose of 5040 cGy in 28 treatment fractions to the pelvis along with 5-FU chemotherapy    2.   Anemia. Iron deficiency from rectal bleeding/malignancy  --11/20/23- fe 46 (L), fe sat 13% (L), ferritin 24 (L)  --Venofer x 4-12/4-12/27/23  --Hgb 14.1; MCV 95.3, 5/15/24 (prior linn:  Hgb 11.4)  3.   Thrombocytopenia. Chemo. Normal on 5/15  4.   Diabetes on insulin  5.   Arthritis  6.   BPH  7.   SARAHI  8.   Advanced age  9.   Borderline PS (ECOG 1-2)    Plan:  Apprised of labs, 5/15/24 with stable anemia and platelets 153,000 otherwise normal CBC, glucose 270, alb 3.4 (L), aphos 124 (prior: 108-128) otherwise normal CMP  Note MMR could not be done due to lack of material.  While MSI by PCR: MSI-stable (LORENZO)  Chemo tolerance.  Interval delays due to cytopenias.  Grade 1 diarrhea/neuropathy-  "has received diarrhea teaching sheets. Subjectively says, \"No ill effects really.\"  Review follow-up Dr. Kelly of CRC, 5/1/24 (above).  Plan:  follow-up 1 month after chemo/RT  Follow-up Dr. Barber. 5/6/24--Juliocesar Tellez returns to the clinic today to begin radiation therapy for Stage III (cT3 cN1 M0), well to moderately differentiated rectal adenocarcinoma. This time I recommend a course of pelvic radiation along with low-dose chemotherapy with continuous infusion 5-FU.  I anticipate a dose of 5040 cGy in 28 treatment fractions to the pelvis along with 5-FU chemotherapy  Previously reviewed consult, 12/5/23- Dr. Calderon Cano of GI. Flex sig (above).  No definite adenopathy  Review NCCN guidelines 2.2023 rectal cancer-for clinical stage T4, N any - pMMR/LORENZO- primary treatment - total neoadjuvant therapy.  FOLFOX or [place] ox- 12-16 weeks)-long course chemo/RT with capecitabine or infusional 5-QG-ewipwzhbr (best tumor response 8 weeks after completion of RT)- reassess for surgery (Transabdominal resection or of CR consider observation.  If resection contraindciated-systemic therapy; If dMMR/MSI-H- primary treatment- NEOADJUVANT/DEFINITIVE IMMUNOTHERAPY (PREFERRED)- IO therapy for up to 6 months (nivolumab or pembrolizumab); OR Total neoadjuvant therapy (as above)    Potential toxicities of 5-FU previously discussed (to include but not limited to: Myelosuppression with pancytopenia, cardiotoxicity, angina, MI, arrhythmia, heart failure, QT prolongation, encephalopathy, neurotoxicity, acute cerebellar syndrome, severe diarrhea, severe mucositis, GI ulcer, hand-foot syndrome, anaphylaxis, diarrhea, anorexia, mucositis, stomatitis, Xofigo pharyngitis, confusion, disorientation, euphoria, ataxia, visual disturbance, nystagmus, headache, alopecia, xeroderma, skin fissures, photosensitivity, erythema, skin pigmentation, dermatitis, lacrimation, photophobia, thrombophlebitis, epistaxis, nail changes).  Questions answered.  He " agrees to press on with therapy.    Schedule treatment (coordinate start with RT) be administered Monday through Friday- C1, 5/20/24; C2, 5/27/24; C3, 6/3/24    5- mg/m2/24h (BSA = ; TD=  mg/24h) IVCI over 24 hours x5 days (Total dose = mg on days 1 through 5) x 6 weeks.     10.  Premedicate with:   Benadryl 25 mg IV on day 1 of each week   Decadron 10 mg IV on day 1 of each week         11.  Upon initiation of chemo:  CMP and CBC weekly, with Procrit 40,000 if hemoglobin less than 10 and hematocrit less than 30 and Zarxio/Neupogen 480 mcg subcutaneously x5 days if ANC less than 1.0 at [facility]     12.  Rx:   Zofran 8 mg p.o. 3 times daily #30   Imodium 2 mg po as directed       13.  Return to office in 5-6 weeks with preoffice CBC, diff and CMP-    I spent ~52 minutes caring for Juliocesar on this date of service. This time includes time spent by me in the following activities: preparing for the visit, reviewing tests, performing a medically appropriate examination and/or evaluation, counseling and educating the patient/family/caregiver, ordering medications, tests, or procedures and documenting information in the medical record.

## 2024-05-14 ENCOUNTER — HOSPITAL ENCOUNTER (OUTPATIENT)
Dept: RADIATION ONCOLOGY | Facility: HOSPITAL | Age: 89
Setting detail: RADIATION/ONCOLOGY SERIES
End: 2024-05-14
Payer: MEDICARE

## 2024-05-15 ENCOUNTER — HOSPITAL ENCOUNTER (OUTPATIENT)
Dept: RADIATION ONCOLOGY | Facility: HOSPITAL | Age: 89
Setting detail: RADIATION/ONCOLOGY SERIES
Discharge: HOME | End: 2024-05-15
Payer: MEDICARE

## 2024-05-15 ENCOUNTER — LAB (OUTPATIENT)
Dept: LAB | Facility: HOSPITAL | Age: 89
End: 2024-05-15
Payer: MEDICARE

## 2024-05-15 ENCOUNTER — OFFICE VISIT (OUTPATIENT)
Dept: ONCOLOGY | Facility: CLINIC | Age: 89
End: 2024-05-15
Payer: MEDICARE

## 2024-05-15 VITALS
RESPIRATION RATE: 18 BRPM | BODY MASS INDEX: 26.44 KG/M2 | SYSTOLIC BLOOD PRESSURE: 140 MMHG | HEIGHT: 70 IN | TEMPERATURE: 98.1 F | WEIGHT: 184.7 LBS | OXYGEN SATURATION: 92 % | HEART RATE: 82 BPM | DIASTOLIC BLOOD PRESSURE: 72 MMHG

## 2024-05-15 DIAGNOSIS — C20 RECTAL ADENOCARCINOMA: Primary | ICD-10-CM

## 2024-05-15 LAB
ALBUMIN SERPL-MCNC: 3.4 G/DL (ref 3.5–5.2)
ALBUMIN/GLOB SERPL: 1.1 G/DL
ALP SERPL-CCNC: 124 U/L (ref 39–117)
ALT SERPL W P-5'-P-CCNC: 18 U/L (ref 1–41)
ANION GAP SERPL CALCULATED.3IONS-SCNC: 8 MMOL/L (ref 5–15)
AST SERPL-CCNC: 25 U/L (ref 1–40)
BASOPHILS # BLD AUTO: 0.03 10*3/MM3 (ref 0–0.2)
BASOPHILS NFR BLD AUTO: 0.5 % (ref 0–1.5)
BILIRUB SERPL-MCNC: 0.4 MG/DL (ref 0–1.2)
BUN SERPL-MCNC: 14 MG/DL (ref 8–23)
BUN/CREAT SERPL: 17.7 (ref 7–25)
CALCIUM SPEC-SCNC: 8.6 MG/DL (ref 8.2–9.6)
CHLORIDE SERPL-SCNC: 100 MMOL/L (ref 98–107)
CO2 SERPL-SCNC: 32 MMOL/L (ref 22–29)
CREAT SERPL-MCNC: 0.79 MG/DL (ref 0.76–1.27)
DEPRECATED RDW RBC AUTO: 51.3 FL (ref 37–54)
EGFRCR SERPLBLD CKD-EPI 2021: 83.9 ML/MIN/1.73
EOSINOPHIL # BLD AUTO: 0.21 10*3/MM3 (ref 0–0.4)
EOSINOPHIL NFR BLD AUTO: 3.6 % (ref 0.3–6.2)
ERYTHROCYTE [DISTWIDTH] IN BLOOD BY AUTOMATED COUNT: 14.6 % (ref 12.3–15.4)
GLOBULIN UR ELPH-MCNC: 3.2 GM/DL
GLUCOSE SERPL-MCNC: 270 MG/DL (ref 65–99)
HCT VFR BLD AUTO: 42.8 % (ref 37.5–51)
HGB BLD-MCNC: 14.1 G/DL (ref 13–17.7)
HOLD SPECIMEN: NORMAL
IMM GRANULOCYTES # BLD AUTO: 0.03 10*3/MM3 (ref 0–0.05)
IMM GRANULOCYTES NFR BLD AUTO: 0.5 % (ref 0–0.5)
LYMPHOCYTES # BLD AUTO: 2.41 10*3/MM3 (ref 0.7–3.1)
LYMPHOCYTES NFR BLD AUTO: 40.9 % (ref 19.6–45.3)
MCH RBC QN AUTO: 31.4 PG (ref 26.6–33)
MCHC RBC AUTO-ENTMCNC: 32.9 G/DL (ref 31.5–35.7)
MCV RBC AUTO: 95.3 FL (ref 79–97)
MONOCYTES # BLD AUTO: 0.77 10*3/MM3 (ref 0.1–0.9)
MONOCYTES NFR BLD AUTO: 13.1 % (ref 5–12)
NEUTROPHILS NFR BLD AUTO: 2.44 10*3/MM3 (ref 1.7–7)
NEUTROPHILS NFR BLD AUTO: 41.4 % (ref 42.7–76)
NRBC BLD AUTO-RTO: 0 /100 WBC (ref 0–0.2)
PLATELET # BLD AUTO: 153 10*3/MM3 (ref 140–450)
PMV BLD AUTO: 9.6 FL (ref 6–12)
POTASSIUM SERPL-SCNC: 3.8 MMOL/L (ref 3.5–5.2)
PROT SERPL-MCNC: 6.6 G/DL (ref 6–8.5)
RBC # BLD AUTO: 4.49 10*6/MM3 (ref 4.14–5.8)
SODIUM SERPL-SCNC: 140 MMOL/L (ref 136–145)
WBC NRBC COR # BLD AUTO: 5.89 10*3/MM3 (ref 3.4–10.8)

## 2024-05-15 PROCEDURE — 77290 THER RAD SIMULAJ FIELD CPLX: CPT | Performed by: RADIOLOGY

## 2024-05-15 PROCEDURE — 77334 RADIATION TREATMENT AID(S): CPT | Performed by: RADIOLOGY

## 2024-05-15 PROCEDURE — 36415 COLL VENOUS BLD VENIPUNCTURE: CPT

## 2024-05-15 PROCEDURE — 85025 COMPLETE CBC W/AUTO DIFF WBC: CPT

## 2024-05-15 PROCEDURE — 80053 COMPREHEN METABOLIC PANEL: CPT

## 2024-05-16 DIAGNOSIS — C20 RECTAL ADENOCARCINOMA: Primary | ICD-10-CM

## 2024-05-16 RX ORDER — DIPHENHYDRAMINE HYDROCHLORIDE 50 MG/ML
50 INJECTION INTRAMUSCULAR; INTRAVENOUS AS NEEDED
OUTPATIENT
Start: 2024-05-20

## 2024-05-16 RX ORDER — FAMOTIDINE 10 MG/ML
20 INJECTION, SOLUTION INTRAVENOUS AS NEEDED
OUTPATIENT
Start: 2024-05-20

## 2024-05-22 PROCEDURE — 77470 SPECIAL RADIATION TREATMENT: CPT | Performed by: RADIOLOGY

## 2024-05-22 PROCEDURE — 77295 3-D RADIOTHERAPY PLAN: CPT | Performed by: RADIOLOGY

## 2024-05-22 PROCEDURE — 77300 RADIATION THERAPY DOSE PLAN: CPT | Performed by: RADIOLOGY

## 2024-05-22 PROCEDURE — 77334 RADIATION TREATMENT AID(S): CPT | Performed by: RADIOLOGY

## 2024-05-29 ENCOUNTER — TELEPHONE (OUTPATIENT)
Dept: ONCOLOGY | Facility: CLINIC | Age: 89
End: 2024-05-29
Payer: MEDICARE

## 2024-05-29 NOTE — TELEPHONE ENCOUNTER
Spoke with Juliocesar and let him know that he is scheduled to start chemo on 06/03/24 at 9:30 am.  Verbalized understanding.

## 2024-06-03 ENCOUNTER — HOSPITAL ENCOUNTER (OUTPATIENT)
Dept: RADIATION ONCOLOGY | Facility: HOSPITAL | Age: 89
Setting detail: RADIATION/ONCOLOGY SERIES
End: 2024-06-03
Payer: MEDICARE

## 2024-06-03 ENCOUNTER — DOCUMENTATION (OUTPATIENT)
Dept: RADIATION ONCOLOGY | Facility: HOSPITAL | Age: 89
End: 2024-06-03
Payer: MEDICARE

## 2024-06-03 ENCOUNTER — LAB (OUTPATIENT)
Dept: LAB | Facility: HOSPITAL | Age: 89
End: 2024-06-03
Payer: MEDICARE

## 2024-06-03 ENCOUNTER — INFUSION (OUTPATIENT)
Dept: ONCOLOGY | Facility: HOSPITAL | Age: 89
End: 2024-06-03
Payer: MEDICARE

## 2024-06-03 ENCOUNTER — HOSPITAL ENCOUNTER (OUTPATIENT)
Dept: RADIATION ONCOLOGY | Facility: HOSPITAL | Age: 89
Setting detail: RADIATION/ONCOLOGY SERIES
Discharge: HOME | End: 2024-06-03
Payer: MEDICARE

## 2024-06-03 ENCOUNTER — APPOINTMENT (OUTPATIENT)
Dept: RADIATION ONCOLOGY | Facility: HOSPITAL | Age: 89
End: 2024-06-03
Payer: MEDICARE

## 2024-06-03 VITALS
WEIGHT: 181 LBS | BODY MASS INDEX: 25.91 KG/M2 | OXYGEN SATURATION: 93 % | RESPIRATION RATE: 8 BRPM | HEIGHT: 70 IN | HEART RATE: 80 BPM | SYSTOLIC BLOOD PRESSURE: 156 MMHG | DIASTOLIC BLOOD PRESSURE: 60 MMHG | TEMPERATURE: 97.3 F

## 2024-06-03 DIAGNOSIS — C20 RECTAL ADENOCARCINOMA: Primary | ICD-10-CM

## 2024-06-03 DIAGNOSIS — C20 RECTAL ADENOCARCINOMA: ICD-10-CM

## 2024-06-03 DIAGNOSIS — Z45.2 ENCOUNTER FOR CARE RELATED TO VASCULAR ACCESS PORT: ICD-10-CM

## 2024-06-03 LAB
ALBUMIN SERPL-MCNC: 3.5 G/DL (ref 3.5–5.2)
ALBUMIN/GLOB SERPL: 1.2 G/DL
ALP SERPL-CCNC: 101 U/L (ref 39–117)
ALT SERPL W P-5'-P-CCNC: 18 U/L (ref 1–41)
ANION GAP SERPL CALCULATED.3IONS-SCNC: 7 MMOL/L (ref 5–15)
AST SERPL-CCNC: 23 U/L (ref 1–40)
BASOPHILS # BLD AUTO: 0.04 10*3/MM3 (ref 0–0.2)
BASOPHILS NFR BLD AUTO: 0.5 % (ref 0–1.5)
BILIRUB SERPL-MCNC: 0.4 MG/DL (ref 0–1.2)
BUN SERPL-MCNC: 13 MG/DL (ref 8–23)
BUN/CREAT SERPL: 16.7 (ref 7–25)
CALCIUM SPEC-SCNC: 8.9 MG/DL (ref 8.2–9.6)
CHLORIDE SERPL-SCNC: 105 MMOL/L (ref 98–107)
CO2 SERPL-SCNC: 33 MMOL/L (ref 22–29)
CREAT SERPL-MCNC: 0.78 MG/DL (ref 0.76–1.27)
DEPRECATED RDW RBC AUTO: 49.5 FL (ref 37–54)
EGFRCR SERPLBLD CKD-EPI 2021: 84.2 ML/MIN/1.73
EOSINOPHIL # BLD AUTO: 0.38 10*3/MM3 (ref 0–0.4)
EOSINOPHIL NFR BLD AUTO: 4.7 % (ref 0.3–6.2)
ERYTHROCYTE [DISTWIDTH] IN BLOOD BY AUTOMATED COUNT: 14 % (ref 12.3–15.4)
GLOBULIN UR ELPH-MCNC: 3 GM/DL
GLUCOSE SERPL-MCNC: 229 MG/DL (ref 65–99)
HCT VFR BLD AUTO: 41.7 % (ref 37.5–51)
HGB BLD-MCNC: 14 G/DL (ref 13–17.7)
IMM GRANULOCYTES # BLD AUTO: 0.01 10*3/MM3 (ref 0–0.05)
IMM GRANULOCYTES NFR BLD AUTO: 0.1 % (ref 0–0.5)
LYMPHOCYTES # BLD AUTO: 2.92 10*3/MM3 (ref 0.7–3.1)
LYMPHOCYTES NFR BLD AUTO: 36.3 % (ref 19.6–45.3)
MCH RBC QN AUTO: 32.2 PG (ref 26.6–33)
MCHC RBC AUTO-ENTMCNC: 33.6 G/DL (ref 31.5–35.7)
MCV RBC AUTO: 95.9 FL (ref 79–97)
MONOCYTES # BLD AUTO: 0.78 10*3/MM3 (ref 0.1–0.9)
MONOCYTES NFR BLD AUTO: 9.7 % (ref 5–12)
NEUTROPHILS NFR BLD AUTO: 3.91 10*3/MM3 (ref 1.7–7)
NEUTROPHILS NFR BLD AUTO: 48.7 % (ref 42.7–76)
PLATELET # BLD AUTO: 150 10*3/MM3 (ref 140–450)
PMV BLD AUTO: 9.7 FL (ref 6–12)
POTASSIUM SERPL-SCNC: 4.5 MMOL/L (ref 3.5–5.2)
PROT SERPL-MCNC: 6.5 G/DL (ref 6–8.5)
RAD ONC ARIA COURSE ID: NORMAL
RAD ONC ARIA COURSE LAST TREATMENT DATE: NORMAL
RAD ONC ARIA COURSE START DATE: NORMAL
RAD ONC ARIA COURSE TREATMENT ELAPSED DAYS: 0
RAD ONC ARIA FIRST TREATMENT DATE: NORMAL
RAD ONC ARIA PLAN FRACTIONS TREATED TO DATE: 1
RAD ONC ARIA PLAN ID: NORMAL
RAD ONC ARIA PLAN PRESCRIBED DOSE PER FRACTION: 1.8 GY
RAD ONC ARIA PLAN PRIMARY REFERENCE POINT: NORMAL
RAD ONC ARIA PLAN TOTAL FRACTIONS PRESCRIBED: 25
RAD ONC ARIA PLAN TOTAL PRESCRIBED DOSE: 4500 CGY
RAD ONC ARIA REFERENCE POINT DOSAGE GIVEN TO DATE: 1.8 GY
RAD ONC ARIA REFERENCE POINT ID: NORMAL
RAD ONC ARIA REFERENCE POINT SESSION DOSAGE GIVEN: 1.8 GY
RBC # BLD AUTO: 4.35 10*6/MM3 (ref 4.14–5.8)
SODIUM SERPL-SCNC: 145 MMOL/L (ref 136–145)
WBC NRBC COR # BLD AUTO: 8.04 10*3/MM3 (ref 3.4–10.8)

## 2024-06-03 PROCEDURE — 25010000002 DEXAMETHASONE SODIUM PHOSPHATE 100 MG/10ML SOLUTION: Performed by: INTERNAL MEDICINE

## 2024-06-03 PROCEDURE — 96367 TX/PROPH/DG ADDL SEQ IV INF: CPT

## 2024-06-03 PROCEDURE — 25810000003 SODIUM CHLORIDE 0.9 % SOLUTION 250 ML FLEX CONT: Performed by: INTERNAL MEDICINE

## 2024-06-03 PROCEDURE — 96365 THER/PROPH/DIAG IV INF INIT: CPT

## 2024-06-03 PROCEDURE — 25010000002 DIPHENHYDRAMINE PER 50 MG: Performed by: INTERNAL MEDICINE

## 2024-06-03 PROCEDURE — G0498 CHEMO EXTEND IV INFUS W/PUMP: HCPCS

## 2024-06-03 PROCEDURE — 25010000002 FLUOROURACIL PER 500 MG: Performed by: INTERNAL MEDICINE

## 2024-06-03 PROCEDURE — 36415 COLL VENOUS BLD VENIPUNCTURE: CPT

## 2024-06-03 PROCEDURE — 96375 TX/PRO/DX INJ NEW DRUG ADDON: CPT

## 2024-06-03 PROCEDURE — 77412 RADIATION TX DELIVERY LVL 3: CPT | Performed by: RADIOLOGY

## 2024-06-03 PROCEDURE — 80053 COMPREHEN METABOLIC PANEL: CPT

## 2024-06-03 PROCEDURE — 85025 COMPLETE CBC W/AUTO DIFF WBC: CPT

## 2024-06-03 PROCEDURE — 77417 THER RADIOLOGY PORT IMAGE(S): CPT | Performed by: RADIOLOGY

## 2024-06-03 RX ORDER — HEPARIN SODIUM (PORCINE) LOCK FLUSH IV SOLN 100 UNIT/ML 100 UNIT/ML
500 SOLUTION INTRAVENOUS AS NEEDED
Status: CANCELLED | OUTPATIENT
Start: 2024-06-03

## 2024-06-03 RX ORDER — DIPHENHYDRAMINE HYDROCHLORIDE 50 MG/ML
50 INJECTION INTRAMUSCULAR; INTRAVENOUS AS NEEDED
Status: DISCONTINUED | OUTPATIENT
Start: 2024-06-03 | End: 2024-06-03 | Stop reason: HOSPADM

## 2024-06-03 RX ORDER — FAMOTIDINE 10 MG/ML
20 INJECTION, SOLUTION INTRAVENOUS AS NEEDED
Status: DISCONTINUED | OUTPATIENT
Start: 2024-06-03 | End: 2024-06-03 | Stop reason: HOSPADM

## 2024-06-03 RX ORDER — HEPARIN SODIUM (PORCINE) LOCK FLUSH IV SOLN 100 UNIT/ML 100 UNIT/ML
500 SOLUTION INTRAVENOUS AS NEEDED
Status: DISCONTINUED | OUTPATIENT
Start: 2024-06-03 | End: 2024-06-03 | Stop reason: HOSPADM

## 2024-06-03 RX ORDER — SODIUM CHLORIDE 0.9 % (FLUSH) 0.9 %
10 SYRINGE (ML) INJECTION AS NEEDED
Status: DISCONTINUED | OUTPATIENT
Start: 2024-06-03 | End: 2024-06-03 | Stop reason: HOSPADM

## 2024-06-03 RX ORDER — SODIUM CHLORIDE 0.9 % (FLUSH) 0.9 %
10 SYRINGE (ML) INJECTION AS NEEDED
Status: CANCELLED | OUTPATIENT
Start: 2024-06-03

## 2024-06-03 RX ADMIN — DEXAMETHASONE SODIUM PHOSPHATE 12 MG: 10 INJECTION, SOLUTION INTRAMUSCULAR; INTRAVENOUS at 10:43

## 2024-06-03 RX ADMIN — DIPHENHYDRAMINE HYDROCHLORIDE 25 MG: 50 INJECTION, SOLUTION INTRAMUSCULAR; INTRAVENOUS at 11:01

## 2024-06-03 RX ADMIN — FLUOROURACIL 1820 MG: 50 INJECTION, SOLUTION INTRAVENOUS at 11:45

## 2024-06-03 NOTE — PROGRESS NOTES
SANTHOSH met with Mr. Tellez who is here to start radiation and a new treatment regime for rectal adenocarcinoma. He is 89 years old and continues to live with his wife. He has a strong support system. Mr. Tellez states he is ready to start “this chapter” in his treatment. He states his body handled his previous chemo treatments well and hopes this will continue. Currently, he does not have transportation or financial concerns. He does not take any medication for anxiety/depression, and he maintains positive coping strategies. Mr. Tellez denies needing any assistance. SANTHOSH encouraged him to call if assistance is needed in the future.

## 2024-06-04 ENCOUNTER — HOSPITAL ENCOUNTER (OUTPATIENT)
Dept: RADIATION ONCOLOGY | Facility: HOSPITAL | Age: 89
Discharge: HOME | End: 2024-06-04
Payer: MEDICARE

## 2024-06-04 LAB
RAD ONC ARIA COURSE ID: NORMAL
RAD ONC ARIA COURSE LAST TREATMENT DATE: NORMAL
RAD ONC ARIA COURSE START DATE: NORMAL
RAD ONC ARIA COURSE TREATMENT ELAPSED DAYS: 1
RAD ONC ARIA FIRST TREATMENT DATE: NORMAL
RAD ONC ARIA PLAN FRACTIONS TREATED TO DATE: 2
RAD ONC ARIA PLAN ID: NORMAL
RAD ONC ARIA PLAN PRESCRIBED DOSE PER FRACTION: 1.8 GY
RAD ONC ARIA PLAN PRIMARY REFERENCE POINT: NORMAL
RAD ONC ARIA PLAN TOTAL FRACTIONS PRESCRIBED: 25
RAD ONC ARIA PLAN TOTAL PRESCRIBED DOSE: 4500 CGY
RAD ONC ARIA REFERENCE POINT DOSAGE GIVEN TO DATE: 3.6 GY
RAD ONC ARIA REFERENCE POINT ID: NORMAL
RAD ONC ARIA REFERENCE POINT SESSION DOSAGE GIVEN: 1.8 GY

## 2024-06-04 PROCEDURE — 77412 RADIATION TX DELIVERY LVL 3: CPT | Performed by: RADIOLOGY

## 2024-06-05 ENCOUNTER — HOSPITAL ENCOUNTER (OUTPATIENT)
Dept: RADIATION ONCOLOGY | Facility: HOSPITAL | Age: 89
Setting detail: RADIATION/ONCOLOGY SERIES
Discharge: HOME | End: 2024-06-05
Payer: MEDICARE

## 2024-06-05 DIAGNOSIS — C20 RECTAL ADENOCARCINOMA: Primary | ICD-10-CM

## 2024-06-05 LAB
RAD ONC ARIA COURSE ID: NORMAL
RAD ONC ARIA COURSE LAST TREATMENT DATE: NORMAL
RAD ONC ARIA COURSE START DATE: NORMAL
RAD ONC ARIA COURSE TREATMENT ELAPSED DAYS: 2
RAD ONC ARIA FIRST TREATMENT DATE: NORMAL
RAD ONC ARIA PLAN FRACTIONS TREATED TO DATE: 3
RAD ONC ARIA PLAN ID: NORMAL
RAD ONC ARIA PLAN PRESCRIBED DOSE PER FRACTION: 1.8 GY
RAD ONC ARIA PLAN PRIMARY REFERENCE POINT: NORMAL
RAD ONC ARIA PLAN TOTAL FRACTIONS PRESCRIBED: 25
RAD ONC ARIA PLAN TOTAL PRESCRIBED DOSE: 4500 CGY
RAD ONC ARIA REFERENCE POINT DOSAGE GIVEN TO DATE: 5.4 GY
RAD ONC ARIA REFERENCE POINT ID: NORMAL
RAD ONC ARIA REFERENCE POINT SESSION DOSAGE GIVEN: 1.8 GY

## 2024-06-05 PROCEDURE — 77300 RADIATION THERAPY DOSE PLAN: CPT | Performed by: RADIOLOGY

## 2024-06-05 PROCEDURE — 77334 RADIATION TREATMENT AID(S): CPT | Performed by: RADIOLOGY

## 2024-06-05 PROCEDURE — 77412 RADIATION TX DELIVERY LVL 3: CPT | Performed by: RADIOLOGY

## 2024-06-05 RX ORDER — FAMOTIDINE 10 MG/ML
20 INJECTION, SOLUTION INTRAVENOUS AS NEEDED
Status: CANCELLED | OUTPATIENT
Start: 2024-06-10

## 2024-06-05 RX ORDER — DIPHENHYDRAMINE HYDROCHLORIDE 50 MG/ML
50 INJECTION INTRAMUSCULAR; INTRAVENOUS AS NEEDED
Status: CANCELLED | OUTPATIENT
Start: 2024-06-10

## 2024-06-06 ENCOUNTER — HOSPITAL ENCOUNTER (OUTPATIENT)
Dept: RADIATION ONCOLOGY | Facility: HOSPITAL | Age: 89
Setting detail: RADIATION/ONCOLOGY SERIES
Discharge: HOME | End: 2024-06-06
Payer: MEDICARE

## 2024-06-06 LAB
RAD ONC ARIA COURSE ID: NORMAL
RAD ONC ARIA COURSE LAST TREATMENT DATE: NORMAL
RAD ONC ARIA COURSE START DATE: NORMAL
RAD ONC ARIA COURSE TREATMENT ELAPSED DAYS: 3
RAD ONC ARIA FIRST TREATMENT DATE: NORMAL
RAD ONC ARIA PLAN FRACTIONS TREATED TO DATE: 4
RAD ONC ARIA PLAN ID: NORMAL
RAD ONC ARIA PLAN PRESCRIBED DOSE PER FRACTION: 1.8 GY
RAD ONC ARIA PLAN PRIMARY REFERENCE POINT: NORMAL
RAD ONC ARIA PLAN TOTAL FRACTIONS PRESCRIBED: 25
RAD ONC ARIA PLAN TOTAL PRESCRIBED DOSE: 4500 CGY
RAD ONC ARIA REFERENCE POINT DOSAGE GIVEN TO DATE: 7.2 GY
RAD ONC ARIA REFERENCE POINT ID: NORMAL
RAD ONC ARIA REFERENCE POINT SESSION DOSAGE GIVEN: 1.8 GY

## 2024-06-06 PROCEDURE — 77336 RADIATION PHYSICS CONSULT: CPT | Performed by: RADIOLOGY

## 2024-06-06 PROCEDURE — 77412 RADIATION TX DELIVERY LVL 3: CPT | Performed by: RADIOLOGY

## 2024-06-07 ENCOUNTER — HOSPITAL ENCOUNTER (OUTPATIENT)
Dept: RADIATION ONCOLOGY | Facility: HOSPITAL | Age: 89
Setting detail: RADIATION/ONCOLOGY SERIES
Discharge: HOME | End: 2024-06-07
Payer: MEDICARE

## 2024-06-07 ENCOUNTER — INFUSION (OUTPATIENT)
Dept: ONCOLOGY | Facility: HOSPITAL | Age: 89
End: 2024-06-07
Payer: MEDICARE

## 2024-06-07 VITALS
OXYGEN SATURATION: 93 % | DIASTOLIC BLOOD PRESSURE: 60 MMHG | SYSTOLIC BLOOD PRESSURE: 154 MMHG | HEART RATE: 75 BPM | RESPIRATION RATE: 16 BRPM | TEMPERATURE: 97.4 F

## 2024-06-07 DIAGNOSIS — C20 RECTAL ADENOCARCINOMA: Primary | ICD-10-CM

## 2024-06-07 DIAGNOSIS — Z45.2 ENCOUNTER FOR CARE RELATED TO VASCULAR ACCESS PORT: ICD-10-CM

## 2024-06-07 LAB
RAD ONC ARIA COURSE ID: NORMAL
RAD ONC ARIA COURSE LAST TREATMENT DATE: NORMAL
RAD ONC ARIA COURSE START DATE: NORMAL
RAD ONC ARIA COURSE TREATMENT ELAPSED DAYS: 4
RAD ONC ARIA FIRST TREATMENT DATE: NORMAL
RAD ONC ARIA PLAN FRACTIONS TREATED TO DATE: 5
RAD ONC ARIA PLAN ID: NORMAL
RAD ONC ARIA PLAN PRESCRIBED DOSE PER FRACTION: 1.8 GY
RAD ONC ARIA PLAN PRIMARY REFERENCE POINT: NORMAL
RAD ONC ARIA PLAN TOTAL FRACTIONS PRESCRIBED: 25
RAD ONC ARIA PLAN TOTAL PRESCRIBED DOSE: 4500 CGY
RAD ONC ARIA REFERENCE POINT DOSAGE GIVEN TO DATE: 9 GY
RAD ONC ARIA REFERENCE POINT ID: NORMAL
RAD ONC ARIA REFERENCE POINT SESSION DOSAGE GIVEN: 1.8 GY

## 2024-06-07 PROCEDURE — 25010000002 HEPARIN LOCK FLUSH PER 10 UNITS: Performed by: INTERNAL MEDICINE

## 2024-06-07 PROCEDURE — 77412 RADIATION TX DELIVERY LVL 3: CPT | Performed by: RADIOLOGY

## 2024-06-07 RX ORDER — SODIUM CHLORIDE 0.9 % (FLUSH) 0.9 %
10 SYRINGE (ML) INJECTION AS NEEDED
Status: DISCONTINUED | OUTPATIENT
Start: 2024-06-07 | End: 2024-06-07 | Stop reason: HOSPADM

## 2024-06-07 RX ORDER — HEPARIN SODIUM (PORCINE) LOCK FLUSH IV SOLN 100 UNIT/ML 100 UNIT/ML
500 SOLUTION INTRAVENOUS AS NEEDED
OUTPATIENT
Start: 2024-06-07

## 2024-06-07 RX ORDER — SODIUM CHLORIDE 0.9 % (FLUSH) 0.9 %
10 SYRINGE (ML) INJECTION AS NEEDED
OUTPATIENT
Start: 2024-06-07

## 2024-06-07 RX ORDER — HEPARIN SODIUM (PORCINE) LOCK FLUSH IV SOLN 100 UNIT/ML 100 UNIT/ML
500 SOLUTION INTRAVENOUS AS NEEDED
Status: DISCONTINUED | OUTPATIENT
Start: 2024-06-07 | End: 2024-06-07 | Stop reason: HOSPADM

## 2024-06-07 RX ADMIN — HEPARIN 500 UNITS: 100 SYRINGE at 10:04

## 2024-06-07 RX ADMIN — Medication 10 ML: at 10:05

## 2024-06-10 ENCOUNTER — TELEPHONE (OUTPATIENT)
Dept: ONCOLOGY | Facility: CLINIC | Age: 89
End: 2024-06-10
Payer: MEDICARE

## 2024-06-10 ENCOUNTER — HOSPITAL ENCOUNTER (OUTPATIENT)
Dept: RADIATION ONCOLOGY | Facility: HOSPITAL | Age: 89
Setting detail: RADIATION/ONCOLOGY SERIES
Discharge: HOME | End: 2024-06-10
Payer: MEDICARE

## 2024-06-10 ENCOUNTER — INFUSION (OUTPATIENT)
Dept: ONCOLOGY | Facility: HOSPITAL | Age: 89
End: 2024-06-10
Payer: MEDICARE

## 2024-06-10 ENCOUNTER — LAB (OUTPATIENT)
Dept: LAB | Facility: HOSPITAL | Age: 89
End: 2024-06-10
Payer: MEDICARE

## 2024-06-10 VITALS
DIASTOLIC BLOOD PRESSURE: 58 MMHG | HEIGHT: 70 IN | SYSTOLIC BLOOD PRESSURE: 144 MMHG | WEIGHT: 180 LBS | RESPIRATION RATE: 16 BRPM | BODY MASS INDEX: 25.77 KG/M2 | OXYGEN SATURATION: 96 % | HEART RATE: 80 BPM | TEMPERATURE: 97.1 F

## 2024-06-10 DIAGNOSIS — C20 RECTAL ADENOCARCINOMA: Primary | ICD-10-CM

## 2024-06-10 DIAGNOSIS — C20 RECTAL ADENOCARCINOMA: ICD-10-CM

## 2024-06-10 LAB
ALBUMIN SERPL-MCNC: 3.6 G/DL (ref 3.5–5.2)
ALBUMIN/GLOB SERPL: 1.2 G/DL
ALP SERPL-CCNC: 101 U/L (ref 39–117)
ALT SERPL W P-5'-P-CCNC: 25 U/L (ref 1–41)
ANION GAP SERPL CALCULATED.3IONS-SCNC: 7 MMOL/L (ref 5–15)
AST SERPL-CCNC: 26 U/L (ref 1–40)
BASOPHILS # BLD AUTO: 0.03 10*3/MM3 (ref 0–0.2)
BASOPHILS NFR BLD AUTO: 0.4 % (ref 0–1.5)
BILIRUB SERPL-MCNC: 0.5 MG/DL (ref 0–1.2)
BUN SERPL-MCNC: 12 MG/DL (ref 8–23)
BUN/CREAT SERPL: 16.7 (ref 7–25)
CALCIUM SPEC-SCNC: 8.6 MG/DL (ref 8.2–9.6)
CHLORIDE SERPL-SCNC: 103 MMOL/L (ref 98–107)
CO2 SERPL-SCNC: 32 MMOL/L (ref 22–29)
CREAT SERPL-MCNC: 0.72 MG/DL (ref 0.76–1.27)
DEPRECATED RDW RBC AUTO: 47.8 FL (ref 37–54)
EGFRCR SERPLBLD CKD-EPI 2021: 86.3 ML/MIN/1.73
EOSINOPHIL # BLD AUTO: 0.36 10*3/MM3 (ref 0–0.4)
EOSINOPHIL NFR BLD AUTO: 5.1 % (ref 0.3–6.2)
ERYTHROCYTE [DISTWIDTH] IN BLOOD BY AUTOMATED COUNT: 13.6 % (ref 12.3–15.4)
GLOBULIN UR ELPH-MCNC: 2.9 GM/DL
GLUCOSE SERPL-MCNC: 291 MG/DL (ref 65–99)
HCT VFR BLD AUTO: 42 % (ref 37.5–51)
HGB BLD-MCNC: 14 G/DL (ref 13–17.7)
HOLD SPECIMEN: NORMAL
IMM GRANULOCYTES # BLD AUTO: 0.04 10*3/MM3 (ref 0–0.05)
IMM GRANULOCYTES NFR BLD AUTO: 0.6 % (ref 0–0.5)
LYMPHOCYTES # BLD AUTO: 1.96 10*3/MM3 (ref 0.7–3.1)
LYMPHOCYTES NFR BLD AUTO: 27.7 % (ref 19.6–45.3)
MCH RBC QN AUTO: 31.8 PG (ref 26.6–33)
MCHC RBC AUTO-ENTMCNC: 33.3 G/DL (ref 31.5–35.7)
MCV RBC AUTO: 95.5 FL (ref 79–97)
MONOCYTES # BLD AUTO: 0.51 10*3/MM3 (ref 0.1–0.9)
MONOCYTES NFR BLD AUTO: 7.2 % (ref 5–12)
NEUTROPHILS NFR BLD AUTO: 4.17 10*3/MM3 (ref 1.7–7)
NEUTROPHILS NFR BLD AUTO: 59 % (ref 42.7–76)
PLATELET # BLD AUTO: 134 10*3/MM3 (ref 140–450)
PMV BLD AUTO: 10.1 FL (ref 6–12)
POTASSIUM SERPL-SCNC: 4.2 MMOL/L (ref 3.5–5.2)
PROT SERPL-MCNC: 6.5 G/DL (ref 6–8.5)
RAD ONC ARIA COURSE ID: NORMAL
RAD ONC ARIA COURSE LAST TREATMENT DATE: NORMAL
RAD ONC ARIA COURSE START DATE: NORMAL
RAD ONC ARIA COURSE TREATMENT ELAPSED DAYS: 7
RAD ONC ARIA FIRST TREATMENT DATE: NORMAL
RAD ONC ARIA PLAN FRACTIONS TREATED TO DATE: 6
RAD ONC ARIA PLAN ID: NORMAL
RAD ONC ARIA PLAN PRESCRIBED DOSE PER FRACTION: 1.8 GY
RAD ONC ARIA PLAN PRIMARY REFERENCE POINT: NORMAL
RAD ONC ARIA PLAN TOTAL FRACTIONS PRESCRIBED: 25
RAD ONC ARIA PLAN TOTAL PRESCRIBED DOSE: 4500 CGY
RAD ONC ARIA REFERENCE POINT DOSAGE GIVEN TO DATE: 10.8 GY
RAD ONC ARIA REFERENCE POINT ID: NORMAL
RAD ONC ARIA REFERENCE POINT SESSION DOSAGE GIVEN: 1.8 GY
RBC # BLD AUTO: 4.4 10*6/MM3 (ref 4.14–5.8)
SODIUM SERPL-SCNC: 142 MMOL/L (ref 136–145)
WBC NRBC COR # BLD AUTO: 7.07 10*3/MM3 (ref 3.4–10.8)

## 2024-06-10 PROCEDURE — 80053 COMPREHEN METABOLIC PANEL: CPT

## 2024-06-10 PROCEDURE — G0498 CHEMO EXTEND IV INFUS W/PUMP: HCPCS

## 2024-06-10 PROCEDURE — 96365 THER/PROPH/DIAG IV INF INIT: CPT

## 2024-06-10 PROCEDURE — 77412 RADIATION TX DELIVERY LVL 3: CPT | Performed by: RADIOLOGY

## 2024-06-10 PROCEDURE — 25010000002 DIPHENHYDRAMINE PER 50 MG: Performed by: INTERNAL MEDICINE

## 2024-06-10 PROCEDURE — 25010000002 FLUOROURACIL PER 500 MG: Performed by: INTERNAL MEDICINE

## 2024-06-10 PROCEDURE — 85025 COMPLETE CBC W/AUTO DIFF WBC: CPT

## 2024-06-10 PROCEDURE — 36415 COLL VENOUS BLD VENIPUNCTURE: CPT

## 2024-06-10 PROCEDURE — 96367 TX/PROPH/DG ADDL SEQ IV INF: CPT

## 2024-06-10 PROCEDURE — 77417 THER RADIOLOGY PORT IMAGE(S): CPT | Performed by: RADIOLOGY

## 2024-06-10 RX ORDER — FAMOTIDINE 10 MG/ML
20 INJECTION, SOLUTION INTRAVENOUS AS NEEDED
Status: DISCONTINUED | OUTPATIENT
Start: 2024-06-10 | End: 2024-06-10 | Stop reason: HOSPADM

## 2024-06-10 RX ORDER — DIPHENHYDRAMINE HYDROCHLORIDE 50 MG/ML
50 INJECTION INTRAMUSCULAR; INTRAVENOUS AS NEEDED
Status: DISCONTINUED | OUTPATIENT
Start: 2024-06-10 | End: 2024-06-10 | Stop reason: HOSPADM

## 2024-06-10 RX ADMIN — FLUOROURACIL 1820 MG: 50 INJECTION, SOLUTION INTRAVENOUS at 11:44

## 2024-06-10 RX ADMIN — DIPHENHYDRAMINE HYDROCHLORIDE 25 MG: 50 INJECTION, SOLUTION INTRAMUSCULAR; INTRAVENOUS at 11:03

## 2024-06-10 NOTE — TELEPHONE ENCOUNTER
Received call from [facility]. She calls to report patient Juliocesar Tellez was twyla for chemo TXT 5-FU pump today 6/10/24  GLUCOSE: 291  Questions to proceed with today's planned TXT  Also would like premedications reviewed   Benadryl and Dexamethasone.   Nurse reports patient did take his Insulin prior to arrival to Center for his planned TXT>    Relayed all information to Dr Abraham, He recommends.   Recheck Glucose reading  If below 250 proceed with today's if Glucose remains elevated cancel today's TXT and have patient f/u with prescriber of his Insulin for dose adjustments and eval.   2. Remove premedications Benadryl and Dexamethasone from today's planned TXT of 5-FU Pump.    Recheck: GLUCOSE READING 244 per patient self check device  Dr Abraham informed, recommends proceed with today's TXT and take his diabetes medications as prescribed. Nurse informed

## 2024-06-11 ENCOUNTER — HOSPITAL ENCOUNTER (OUTPATIENT)
Dept: RADIATION ONCOLOGY | Facility: HOSPITAL | Age: 89
Setting detail: RADIATION/ONCOLOGY SERIES
Discharge: HOME | End: 2024-06-11
Payer: MEDICARE

## 2024-06-11 LAB
RAD ONC ARIA COURSE ID: NORMAL
RAD ONC ARIA COURSE LAST TREATMENT DATE: NORMAL
RAD ONC ARIA COURSE START DATE: NORMAL
RAD ONC ARIA COURSE TREATMENT ELAPSED DAYS: 8
RAD ONC ARIA FIRST TREATMENT DATE: NORMAL
RAD ONC ARIA PLAN FRACTIONS TREATED TO DATE: 7
RAD ONC ARIA PLAN ID: NORMAL
RAD ONC ARIA PLAN PRESCRIBED DOSE PER FRACTION: 1.8 GY
RAD ONC ARIA PLAN PRIMARY REFERENCE POINT: NORMAL
RAD ONC ARIA PLAN TOTAL FRACTIONS PRESCRIBED: 25
RAD ONC ARIA PLAN TOTAL PRESCRIBED DOSE: 4500 CGY
RAD ONC ARIA REFERENCE POINT DOSAGE GIVEN TO DATE: 12.6 GY
RAD ONC ARIA REFERENCE POINT ID: NORMAL
RAD ONC ARIA REFERENCE POINT SESSION DOSAGE GIVEN: 1.8 GY

## 2024-06-11 PROCEDURE — 77387 GUIDANCE FOR RADJ TX DLVR: CPT

## 2024-06-11 PROCEDURE — 77412 RADIATION TX DELIVERY LVL 3: CPT | Performed by: RADIOLOGY

## 2024-06-12 ENCOUNTER — HOSPITAL ENCOUNTER (OUTPATIENT)
Dept: RADIATION ONCOLOGY | Facility: HOSPITAL | Age: 89
Setting detail: RADIATION/ONCOLOGY SERIES
Discharge: HOME | End: 2024-06-12
Payer: MEDICARE

## 2024-06-12 DIAGNOSIS — C20 RECTAL ADENOCARCINOMA: Primary | ICD-10-CM

## 2024-06-12 LAB
RAD ONC ARIA COURSE ID: NORMAL
RAD ONC ARIA COURSE LAST TREATMENT DATE: NORMAL
RAD ONC ARIA COURSE START DATE: NORMAL
RAD ONC ARIA COURSE TREATMENT ELAPSED DAYS: 9
RAD ONC ARIA FIRST TREATMENT DATE: NORMAL
RAD ONC ARIA PLAN FRACTIONS TREATED TO DATE: 8
RAD ONC ARIA PLAN ID: NORMAL
RAD ONC ARIA PLAN PRESCRIBED DOSE PER FRACTION: 1.8 GY
RAD ONC ARIA PLAN PRIMARY REFERENCE POINT: NORMAL
RAD ONC ARIA PLAN TOTAL FRACTIONS PRESCRIBED: 25
RAD ONC ARIA PLAN TOTAL PRESCRIBED DOSE: 4500 CGY
RAD ONC ARIA REFERENCE POINT DOSAGE GIVEN TO DATE: 14.4 GY
RAD ONC ARIA REFERENCE POINT ID: NORMAL
RAD ONC ARIA REFERENCE POINT SESSION DOSAGE GIVEN: 1.8 GY

## 2024-06-12 PROCEDURE — 77412 RADIATION TX DELIVERY LVL 3: CPT | Performed by: RADIOLOGY

## 2024-06-12 RX ORDER — FAMOTIDINE 10 MG/ML
20 INJECTION, SOLUTION INTRAVENOUS AS NEEDED
Status: CANCELLED | OUTPATIENT
Start: 2024-06-17

## 2024-06-12 RX ORDER — DIPHENHYDRAMINE HYDROCHLORIDE 50 MG/ML
50 INJECTION INTRAMUSCULAR; INTRAVENOUS AS NEEDED
Status: CANCELLED | OUTPATIENT
Start: 2024-06-17

## 2024-06-13 ENCOUNTER — HOSPITAL ENCOUNTER (OUTPATIENT)
Dept: RADIATION ONCOLOGY | Facility: HOSPITAL | Age: 89
Setting detail: RADIATION/ONCOLOGY SERIES
Discharge: HOME | End: 2024-06-13
Payer: MEDICARE

## 2024-06-13 LAB
RAD ONC ARIA COURSE ID: NORMAL
RAD ONC ARIA COURSE LAST TREATMENT DATE: NORMAL
RAD ONC ARIA COURSE START DATE: NORMAL
RAD ONC ARIA COURSE TREATMENT ELAPSED DAYS: 10
RAD ONC ARIA FIRST TREATMENT DATE: NORMAL
RAD ONC ARIA PLAN FRACTIONS TREATED TO DATE: 9
RAD ONC ARIA PLAN ID: NORMAL
RAD ONC ARIA PLAN PRESCRIBED DOSE PER FRACTION: 1.8 GY
RAD ONC ARIA PLAN PRIMARY REFERENCE POINT: NORMAL
RAD ONC ARIA PLAN TOTAL FRACTIONS PRESCRIBED: 25
RAD ONC ARIA PLAN TOTAL PRESCRIBED DOSE: 4500 CGY
RAD ONC ARIA REFERENCE POINT DOSAGE GIVEN TO DATE: 16.2 GY
RAD ONC ARIA REFERENCE POINT ID: NORMAL
RAD ONC ARIA REFERENCE POINT SESSION DOSAGE GIVEN: 1.8 GY

## 2024-06-13 PROCEDURE — 77412 RADIATION TX DELIVERY LVL 3: CPT | Performed by: RADIOLOGY

## 2024-06-13 PROCEDURE — 77336 RADIATION PHYSICS CONSULT: CPT | Performed by: RADIOLOGY

## 2024-06-14 ENCOUNTER — HOSPITAL ENCOUNTER (OUTPATIENT)
Dept: RADIATION ONCOLOGY | Facility: HOSPITAL | Age: 89
Setting detail: RADIATION/ONCOLOGY SERIES
Discharge: HOME | End: 2024-06-14
Payer: MEDICARE

## 2024-06-14 ENCOUNTER — INFUSION (OUTPATIENT)
Dept: ONCOLOGY | Facility: HOSPITAL | Age: 89
End: 2024-06-14
Payer: MEDICARE

## 2024-06-14 VITALS
HEART RATE: 76 BPM | DIASTOLIC BLOOD PRESSURE: 78 MMHG | TEMPERATURE: 97.6 F | OXYGEN SATURATION: 97 % | SYSTOLIC BLOOD PRESSURE: 120 MMHG | RESPIRATION RATE: 18 BRPM

## 2024-06-14 DIAGNOSIS — Z45.2 ENCOUNTER FOR CARE RELATED TO VASCULAR ACCESS PORT: ICD-10-CM

## 2024-06-14 DIAGNOSIS — C20 RECTAL ADENOCARCINOMA: Primary | ICD-10-CM

## 2024-06-14 LAB
RAD ONC ARIA COURSE ID: NORMAL
RAD ONC ARIA COURSE LAST TREATMENT DATE: NORMAL
RAD ONC ARIA COURSE START DATE: NORMAL
RAD ONC ARIA COURSE TREATMENT ELAPSED DAYS: 11
RAD ONC ARIA FIRST TREATMENT DATE: NORMAL
RAD ONC ARIA PLAN FRACTIONS TREATED TO DATE: 10
RAD ONC ARIA PLAN ID: NORMAL
RAD ONC ARIA PLAN PRESCRIBED DOSE PER FRACTION: 1.8 GY
RAD ONC ARIA PLAN PRIMARY REFERENCE POINT: NORMAL
RAD ONC ARIA PLAN TOTAL FRACTIONS PRESCRIBED: 25
RAD ONC ARIA PLAN TOTAL PRESCRIBED DOSE: 4500 CGY
RAD ONC ARIA REFERENCE POINT DOSAGE GIVEN TO DATE: 18 GY
RAD ONC ARIA REFERENCE POINT ID: NORMAL
RAD ONC ARIA REFERENCE POINT SESSION DOSAGE GIVEN: 1.8 GY

## 2024-06-14 PROCEDURE — 25010000002 HEPARIN LOCK FLUSH PER 10 UNITS: Performed by: INTERNAL MEDICINE

## 2024-06-14 PROCEDURE — 77412 RADIATION TX DELIVERY LVL 3: CPT | Performed by: RADIOLOGY

## 2024-06-14 RX ORDER — HEPARIN SODIUM (PORCINE) LOCK FLUSH IV SOLN 100 UNIT/ML 100 UNIT/ML
500 SOLUTION INTRAVENOUS AS NEEDED
Status: DISCONTINUED | OUTPATIENT
Start: 2024-06-14 | End: 2024-06-14 | Stop reason: HOSPADM

## 2024-06-14 RX ORDER — SODIUM CHLORIDE 0.9 % (FLUSH) 0.9 %
10 SYRINGE (ML) INJECTION AS NEEDED
Status: DISCONTINUED | OUTPATIENT
Start: 2024-06-14 | End: 2024-06-14 | Stop reason: HOSPADM

## 2024-06-14 RX ORDER — HEPARIN SODIUM (PORCINE) LOCK FLUSH IV SOLN 100 UNIT/ML 100 UNIT/ML
500 SOLUTION INTRAVENOUS AS NEEDED
OUTPATIENT
Start: 2024-06-14

## 2024-06-14 RX ORDER — SODIUM CHLORIDE 0.9 % (FLUSH) 0.9 %
10 SYRINGE (ML) INJECTION AS NEEDED
OUTPATIENT
Start: 2024-06-14

## 2024-06-14 RX ADMIN — HEPARIN 500 UNITS: 100 SYRINGE at 10:03

## 2024-06-14 RX ADMIN — Medication 10 ML: at 10:03

## 2024-06-17 ENCOUNTER — LAB (OUTPATIENT)
Dept: LAB | Facility: HOSPITAL | Age: 89
End: 2024-06-17
Payer: MEDICARE

## 2024-06-17 ENCOUNTER — HOSPITAL ENCOUNTER (OUTPATIENT)
Dept: RADIATION ONCOLOGY | Facility: HOSPITAL | Age: 89
Setting detail: RADIATION/ONCOLOGY SERIES
Discharge: HOME | End: 2024-06-17
Payer: MEDICARE

## 2024-06-17 ENCOUNTER — INFUSION (OUTPATIENT)
Dept: ONCOLOGY | Facility: HOSPITAL | Age: 89
End: 2024-06-17
Payer: MEDICARE

## 2024-06-17 VITALS
SYSTOLIC BLOOD PRESSURE: 135 MMHG | TEMPERATURE: 97.3 F | HEART RATE: 77 BPM | RESPIRATION RATE: 18 BRPM | DIASTOLIC BLOOD PRESSURE: 48 MMHG | OXYGEN SATURATION: 92 %

## 2024-06-17 DIAGNOSIS — C20 RECTAL ADENOCARCINOMA: ICD-10-CM

## 2024-06-17 DIAGNOSIS — C20 RECTAL ADENOCARCINOMA: Primary | ICD-10-CM

## 2024-06-17 LAB
ALBUMIN SERPL-MCNC: 3.7 G/DL (ref 3.5–5.2)
ALBUMIN/GLOB SERPL: 1.3 G/DL
ALP SERPL-CCNC: 98 U/L (ref 39–117)
ALT SERPL W P-5'-P-CCNC: 21 U/L (ref 1–41)
ANION GAP SERPL CALCULATED.3IONS-SCNC: 7 MMOL/L (ref 5–15)
AST SERPL-CCNC: 28 U/L (ref 1–40)
BASOPHILS # BLD AUTO: 0.01 10*3/MM3 (ref 0–0.2)
BASOPHILS NFR BLD AUTO: 0.1 % (ref 0–1.5)
BILIRUB SERPL-MCNC: 0.5 MG/DL (ref 0–1.2)
BUN SERPL-MCNC: 16 MG/DL (ref 8–23)
BUN/CREAT SERPL: 20.5 (ref 7–25)
CALCIUM SPEC-SCNC: 9 MG/DL (ref 8.2–9.6)
CHLORIDE SERPL-SCNC: 101 MMOL/L (ref 98–107)
CO2 SERPL-SCNC: 35 MMOL/L (ref 22–29)
CREAT SERPL-MCNC: 0.78 MG/DL (ref 0.76–1.27)
DEPRECATED RDW RBC AUTO: 47.1 FL (ref 37–54)
EGFRCR SERPLBLD CKD-EPI 2021: 84.2 ML/MIN/1.73
EOSINOPHIL # BLD AUTO: 0.15 10*3/MM3 (ref 0–0.4)
EOSINOPHIL NFR BLD AUTO: 2.2 % (ref 0.3–6.2)
ERYTHROCYTE [DISTWIDTH] IN BLOOD BY AUTOMATED COUNT: 13.8 % (ref 12.3–15.4)
GLOBULIN UR ELPH-MCNC: 2.9 GM/DL
GLUCOSE SERPL-MCNC: 205 MG/DL (ref 65–99)
HCT VFR BLD AUTO: 38.9 % (ref 37.5–51)
HGB BLD-MCNC: 12.9 G/DL (ref 13–17.7)
IMM GRANULOCYTES # BLD AUTO: 0.02 10*3/MM3 (ref 0–0.05)
IMM GRANULOCYTES NFR BLD AUTO: 0.3 % (ref 0–0.5)
LYMPHOCYTES # BLD AUTO: 1.09 10*3/MM3 (ref 0.7–3.1)
LYMPHOCYTES NFR BLD AUTO: 15.7 % (ref 19.6–45.3)
MCH RBC QN AUTO: 31.8 PG (ref 26.6–33)
MCHC RBC AUTO-ENTMCNC: 33.2 G/DL (ref 31.5–35.7)
MCV RBC AUTO: 95.8 FL (ref 79–97)
MONOCYTES # BLD AUTO: 0.54 10*3/MM3 (ref 0.1–0.9)
MONOCYTES NFR BLD AUTO: 7.8 % (ref 5–12)
NEUTROPHILS NFR BLD AUTO: 5.14 10*3/MM3 (ref 1.7–7)
NEUTROPHILS NFR BLD AUTO: 73.9 % (ref 42.7–76)
NRBC BLD AUTO-RTO: 0 /100 WBC (ref 0–0.2)
PLATELET # BLD AUTO: 108 10*3/MM3 (ref 140–450)
PMV BLD AUTO: 9.6 FL (ref 6–12)
POTASSIUM SERPL-SCNC: 4.6 MMOL/L (ref 3.5–5.2)
PROT SERPL-MCNC: 6.6 G/DL (ref 6–8.5)
RAD ONC ARIA COURSE ID: NORMAL
RAD ONC ARIA COURSE LAST TREATMENT DATE: NORMAL
RAD ONC ARIA COURSE START DATE: NORMAL
RAD ONC ARIA COURSE TREATMENT ELAPSED DAYS: 14
RAD ONC ARIA FIRST TREATMENT DATE: NORMAL
RAD ONC ARIA PLAN FRACTIONS TREATED TO DATE: 11
RAD ONC ARIA PLAN ID: NORMAL
RAD ONC ARIA PLAN PRESCRIBED DOSE PER FRACTION: 1.8 GY
RAD ONC ARIA PLAN PRIMARY REFERENCE POINT: NORMAL
RAD ONC ARIA PLAN TOTAL FRACTIONS PRESCRIBED: 25
RAD ONC ARIA PLAN TOTAL PRESCRIBED DOSE: 4500 CGY
RAD ONC ARIA REFERENCE POINT DOSAGE GIVEN TO DATE: 19.8 GY
RAD ONC ARIA REFERENCE POINT ID: NORMAL
RAD ONC ARIA REFERENCE POINT SESSION DOSAGE GIVEN: 1.8 GY
RBC # BLD AUTO: 4.06 10*6/MM3 (ref 4.14–5.8)
SODIUM SERPL-SCNC: 143 MMOL/L (ref 136–145)
WBC NRBC COR # BLD AUTO: 6.95 10*3/MM3 (ref 3.4–10.8)

## 2024-06-17 PROCEDURE — 85025 COMPLETE CBC W/AUTO DIFF WBC: CPT

## 2024-06-17 PROCEDURE — 25010000002 DIPHENHYDRAMINE PER 50 MG: Performed by: INTERNAL MEDICINE

## 2024-06-17 PROCEDURE — 36415 COLL VENOUS BLD VENIPUNCTURE: CPT

## 2024-06-17 PROCEDURE — 96367 TX/PROPH/DG ADDL SEQ IV INF: CPT

## 2024-06-17 PROCEDURE — G0498 CHEMO EXTEND IV INFUS W/PUMP: HCPCS

## 2024-06-17 PROCEDURE — 80053 COMPREHEN METABOLIC PANEL: CPT

## 2024-06-17 PROCEDURE — 25010000002 FLUOROURACIL PER 500 MG: Performed by: INTERNAL MEDICINE

## 2024-06-17 PROCEDURE — 96365 THER/PROPH/DIAG IV INF INIT: CPT

## 2024-06-17 PROCEDURE — 77412 RADIATION TX DELIVERY LVL 3: CPT | Performed by: RADIOLOGY

## 2024-06-17 RX ORDER — FAMOTIDINE 10 MG/ML
20 INJECTION, SOLUTION INTRAVENOUS AS NEEDED
Status: DISCONTINUED | OUTPATIENT
Start: 2024-06-17 | End: 2024-06-17 | Stop reason: HOSPADM

## 2024-06-17 RX ORDER — DIPHENHYDRAMINE HYDROCHLORIDE 50 MG/ML
50 INJECTION INTRAMUSCULAR; INTRAVENOUS AS NEEDED
Status: DISCONTINUED | OUTPATIENT
Start: 2024-06-17 | End: 2024-06-17 | Stop reason: HOSPADM

## 2024-06-17 RX ADMIN — DIPHENHYDRAMINE HYDROCHLORIDE 25 MG: 50 INJECTION, SOLUTION INTRAMUSCULAR; INTRAVENOUS at 10:45

## 2024-06-17 RX ADMIN — FLUOROURACIL 1820 MG: 50 INJECTION, SOLUTION INTRAVENOUS at 11:04

## 2024-06-18 ENCOUNTER — HOSPITAL ENCOUNTER (OUTPATIENT)
Dept: RADIATION ONCOLOGY | Facility: HOSPITAL | Age: 89
Setting detail: RADIATION/ONCOLOGY SERIES
Discharge: HOME | End: 2024-06-18
Payer: MEDICARE

## 2024-06-18 LAB
RAD ONC ARIA COURSE ID: NORMAL
RAD ONC ARIA COURSE LAST TREATMENT DATE: NORMAL
RAD ONC ARIA COURSE START DATE: NORMAL
RAD ONC ARIA COURSE TREATMENT ELAPSED DAYS: 15
RAD ONC ARIA FIRST TREATMENT DATE: NORMAL
RAD ONC ARIA PLAN FRACTIONS TREATED TO DATE: 12
RAD ONC ARIA PLAN ID: NORMAL
RAD ONC ARIA PLAN PRESCRIBED DOSE PER FRACTION: 1.8 GY
RAD ONC ARIA PLAN PRIMARY REFERENCE POINT: NORMAL
RAD ONC ARIA PLAN TOTAL FRACTIONS PRESCRIBED: 25
RAD ONC ARIA PLAN TOTAL PRESCRIBED DOSE: 4500 CGY
RAD ONC ARIA REFERENCE POINT DOSAGE GIVEN TO DATE: 21.6 GY
RAD ONC ARIA REFERENCE POINT ID: NORMAL
RAD ONC ARIA REFERENCE POINT SESSION DOSAGE GIVEN: 1.8 GY

## 2024-06-18 PROCEDURE — 77412 RADIATION TX DELIVERY LVL 3: CPT | Performed by: RADIOLOGY

## 2024-06-18 PROCEDURE — 77417 THER RADIOLOGY PORT IMAGE(S): CPT | Performed by: RADIOLOGY

## 2024-06-18 NOTE — PROGRESS NOTES
"[facility] ONC [facility] HEMATOLOGY & ONCOLOGY  [address]  [place] [address] [phone]    Patient Name: Juliocesar Tellez  Encounter Date: 06/26/2024  YOB: 1932  Patient Number: [id number]      REASON FOR VISIT: Juliocesar Tellez is a 89 yoM who returns in follow-up of stage III rectal adenocarcinoma.  He has received neoadjuvant FOLFOX- C1, 12/12/23; C2, 1/3/24; C3, 1/24/24; C4, 2/28/24; C5, 3/25/24-C6, 4/22/24. He has been seen in follow-up by Dr. Kelly of [facility] surgery, 5/1/24 (below).  He is now receiving RT (beginning 6/3/24- 18/28 fx) with concurrent CIV 5-FU beginning 6/3/24- present.  He is here with his spouse, Cristina (previously with his son, Vivek).    I have reviewed the HPI and verified with the patient the accuracy of it. No changes to interval history since the information was documented. Damian Abraham MD 06/26/24      Diagnostic abnormalities:  --medical history includes diabetes, SARAHI, arthritis, BPH, hyperlipidemia, rectal bleeding and recently diagnosed rectal adenocarcinoma.  --9/18/23- Hgb 12.9 (pcp office)  --10/10/23-referred by Dr. Gerardo Prescott to GI for bright red rectal bleeding x 1 month.  No associated rectal pain, no weight loss no abdominal pain, no lightheadedness, dizziness nor black stool.  --11/9/23- Colonoscopy:  Rectal mass.  Malignant partially obstructing tumor from 4 to 11 cm proximal to the anus.  Biopsied.  Exam otherwise normal on direct and retroflexion views.  Final diagnosis:Large intestine, designated \"rectal mass\".  Invasive adenocarcinoma, well to moderately differentiated.  MSI by PCR: MSI-stable (LORENZO)  --11/20/23- Today the patient is seen for management considerations.  Sodium is 146 otherwise normal CMP, ferritin 24 (L), iron 46 (L), iron saturation 13% (L), B12 380, folate 19.7, CEA 15 (H), Hgb 12.1, MCV 93.3, platelets 196,000, WBC 8.67.  --12/1/23-CT chest-No definite evidence of " metastatic disease in the chest. There is a 7 mm nodular density along the right minor fissure which is likely an intrafissural lymph node or small area of atelectasis/scarring. However, recommend special attention on follow-up imaging to exclude a pulmonary nodule. Advanced pulmonary fibrosis, UIP pattern.  Mild bilateral hilar lymphadenopathy, likely reactive.   --12/1/23- CT abdomen/pelvis-  Thickening of the wall of the rectum consistent with the history of  rectal neoplasm. The outer wall of the rectum is somewhat ill-defined. Therefore, there may be some extension through the wall. In addition, the fat plane between the rectum and prostate is obscured suggesting possible extension of tumor into this area. There is a 5 mm lymph node in the left perirectal fat image 71 series 3. There is an additional small lymph node more superiorly in the pelvic mesenteric fat measuring 6-7 mm in short axis diameter. No other evidence of metastatic disease in the abdomen or pelvis. Bilateral inguinal hernias. The hernia on the right contains a portion of the bladder wall and peritoneal fat. The hernia on the left contains peritoneal fat. There is thickening of the bladder wall. The bladder is under distended. Wall thickening may be due to muscular hypertrophy as there is enlargement of the prostate. Artifact of under distention, infection  and inflammation are considered. Gallstones in the gallbladder.  --12/5/23- flexible sigmoidoscopy with rectal EUS, Dr. Calderon Cano  Known rectal mass was 80% circumferential and invasive appearing with ulceration extending from 4 cm to 14 cm  Endosonography revealed an irregular appearing heterogeneous mass measuring over 4.4 cm in greatest dimension with evidence of invasion through and obliteration of the muscularis propria.  Irregular borders were noted with questionable spread through the rectal wall.  The lesion did not appear to invade the prostate.  No definite lymphadenopathy was  appreciated.    Previous interventions:  -- Venofer  mg- 12/4/23; 12/11/23; 12/18/23; 12/27/23  -- Neoadjuvant FOLFOX- C1, 12/12/23; C2, 1/3/24; C3, 1/24/24; C4, 2/28/24;C5, 3/25/24; C6, 4/22/24.  -- CIV 5-FU beginning 6/3/24; 6/10/24; 6/17/24; 6/24/24  -- Concurrent RT beginning 6/3/24- present (18/28/fx)      PAST MEDICAL HISTORY:  ALLERGIES:  Allergies   Allergen Reactions    Sulfa Antibiotics Other (See Comments)     Flu like symptoms     CURRENT MEDICATIONS:  Outpatient Encounter Medications as of 6/26/2024   Medication Sig Dispense Refill    acetaminophen (Tylenol) 325 MG tablet Take 3 tablets by mouth Every 8 (Eight) Hours. Take every 8 hours for 3 days then take prn as needed.      aspirin 81 MG EC tablet Take 1 tablet by mouth Daily.      atorvastatin (LIPITOR) 40 MG tablet Take 1 tablet by mouth Daily.      azelastine (ASTELIN) 0.1 % nasal spray 2 sprays into the nostril(s) as directed by provider 2 (Two) Times a Day. Use in each nostril as directed      cetirizine (zyrTEC) 10 MG tablet Take 1 tablet by mouth Daily.      dicyclomine (BENTYL) 10 MG capsule Take 1 capsule by mouth 4 (Four) Times a Day Before Meals & at Bedtime.      diphenoxylate-atropine (LOMOTIL) 2.5-0.025 MG per tablet Take 1 tablet by mouth 4 (Four) Times a Day As Needed for Diarrhea. 90 tablet 0    HumaLOG Mix 75/25 KwikPen (75-25) 100 UNIT/ML suspension pen-injector pen       insulin lispro protamine-insulin lispro (humaLOG 75-25) (75-25) 100 UNIT/ML suspension injection Inject  under the skin into the appropriate area as directed Take As Directed. 60 MORNING 35 EVENING      ketoconazole (NIZORAL) 2 % cream Apply 1 Application topically to the appropriate area as directed Daily.      ketoconazole (NIZORAL) 2 % cream Apply 1 Application topically to the appropriate area as directed Daily.      lidocaine-prilocaine (EMLA) 2.5-2.5 % cream 30 minutes prior to access apply generous amount of Emla Cream to port site and cover with  clear plastic wrap until ready for use 1 each 2    lisinopril (PRINIVIL,ZESTRIL) 2.5 MG tablet Take 1 tablet by mouth Daily.      magnesium oxide (MAGOX) 400 (241.3 Mg) MG tablet tablet Take 1 tablet by mouth Daily.      Menthol-Zinc Oxide (Calmoseptine) 0.44-20.6 % ointment Apply 1 Application topically to the appropriate area as directed 2 (Two) Times a Day.      Olopatadine HCl (PATADAY OP) Apply  to eye(s) as directed by provider.      ondansetron (Zofran) 8 MG tablet Take 1 tablet by mouth Every 8 (Eight) Hours As Needed for Nausea or Vomiting. 30 tablet 2    prochlorperazine (COMPAZINE) 10 MG tablet Take 1 tablet by mouth Every 6 (Six) Hours As Needed for Nausea or Vomiting. 60 tablet 1    tamsulosin (FLOMAX) 0.4 MG capsule 24 hr capsule TAKE ONE CAPSULE DAILY      timolol (BLOCADREN) 5 MG tablet Take 1 tablet by mouth 2 (Two) Times a Day.      TRAVOPROST OP Apply  to eye(s) as directed by provider.       Facility-Administered Encounter Medications as of 6/26/2024   Medication Dose Route Frequency Provider Last Rate Last Admin    [COMPLETED] diphenhydrAMINE (BENADRYL) IVPB 25 mg  25 mg Intravenous Once Damian Abraham MD   Stopped at 06/17/24 1102    [COMPLETED] diphenhydrAMINE (BENADRYL) IVPB 25 mg  25 mg Intravenous Once Damian Abraham MD   Stopped at 06/25/24 1129    [DISCONTINUED] diphenhydrAMINE (BENADRYL) injection 50 mg  50 mg Intravenous PRN Damian Abraham MD        [DISCONTINUED] diphenhydrAMINE (BENADRYL) injection 50 mg  50 mg Intravenous PRN Damian Abraham MD        [DISCONTINUED] famotidine (PEPCID) injection 20 mg  20 mg Intravenous PRN Damian Abraham MD        [DISCONTINUED] famotidine (PEPCID) injection 20 mg  20 mg Intravenous PRN Damian Abraham MD        [DISCONTINUED] fluorouracil (ADRUCIL) 1,360 mg in sodium chloride 0.9 % 144 mL chemo infusion - FOR HOME USE  675 mg/m2 (Treatment Plan Recorded) Intravenous Once Damian Abraham MD   1,360 mg at 06/25/24 1151  "   [COMPLETED] fluorouracil (ADRUCIL) 1,820 mg in sodium chloride 0.9 % 192 mL chemo infusion - FOR HOME USE  900 mg/m2 (Treatment Plan Recorded) Intravenous Once Damian Abraham MD   1,820 mg at 06/17/24 1104    [DISCONTINUED] heparin injection 500 Units  500 Units Intravenous PRN Damian Abraham MD        [DISCONTINUED] Hydrocortisone Sod Suc (PF) (Solu-CORTEF) injection 100 mg  100 mg Intravenous PRN Damian Abraham MD        [DISCONTINUED] Hydrocortisone Sod Suc (PF) (Solu-CORTEF) injection 100 mg  100 mg Intravenous PRN Damian Abraham MD        [DISCONTINUED] sodium chloride 0.9 % flush 10 mL  10 mL Intravenous PRN Damian Abraham MD        [DISCONTINUED] sodium chloride 0.9 % infusion  500 mL/hr Intravenous Continuous Damian Abraham MD   Stopped at 06/25/24 1101     Adult illnesses:  Arthritis  BPH  Diabetes  Hemorrhoids  Hyperlipidemia  SARAHI  Primary open-angle glaucoma  Pseudophakia  Rectal bleeding  Rectal carcinoma    Past surgeries:  Tonsillectomy  Colonoscopy with 8 polyps, tubular adenoma, few diverticula, 12/31/2013  Colonoscopy, 11/9/23-rectal mass.  Malignant partially obstructing tumor from 4 to 11 cm proximal to the anus.  Biopsied.  Exam otherwise normal on direct and retroflexion views.  Final diagnosis:Large intestine, designated \"rectal mass\": -Invasive adenocarcinoma, well to moderately differentiated  Port-A-Cath placement in the right cephalic vein, 11/29/23-Dr. Ortiz    ADULT ILLNESSES:  Patient Active Problem List   Diagnosis Code    Chest pain, atypical R07.89    Diabetes mellitus E11.9    Elevated blood pressure reading without diagnosis of hypertension R03.0    BRBPR (bright red blood per rectum) K62.5    Rectal adenocarcinoma C20    Iron deficiency anemia D50.9    Encounter for care related to vascular access port Z45.2    Non-smoker Z78.9    Rectal cancer C20     SURGERIES:  Past Surgical History:   Procedure Laterality Date    COLONOSCOPY  12/31/2013    8 " "polyps, tubular adenoma, a few divertucula    COLONOSCOPY N/A 11/09/2023    Procedure: SIGMOIDOSCOPY FLEXIBLE;  Surgeon: Terry Malik MD;  Location: [facility] ENDOSCOPY;  Service: Gastroenterology;  Laterality: N/A;  preop; BRBPR  postop rectal mass  PCP Gerardo Prescott    TONSILLECTOMY      VENOUS ACCESS DEVICE (PORT) INSERTION N/A 11/29/2023    Procedure: Single Lumen Port-a-cath insertion with flouroscopy;  Surgeon: Marquita Ortiz MD;  Location: [facility] OR;  Service: General;  Laterality: N/A;     HEALTH MAINTENANCE ITEMS:  Health Maintenance Due   Topic Date Due    URINE MICROALBUMIN  Never done    COVID-19 Vaccine (1) Never done    Pneumococcal Vaccine 65+ (1 of 2 - PCV) Never done    DIABETIC EYE EXAM  Never done    RSV Vaccine - Adults (1 - 1-dose 60+ series) Never done    ZOSTER VACCINE (1 of 2) 03/31/2014    LIPID PANEL  01/22/2019    ANNUAL WELLNESS VISIT  Never done    HEMOGLOBIN A1C  07/15/2020    TDAP/TD VACCINES (2 - Tdap) 02/21/2022       <no information>  Last Completed Colonoscopy       This patient has no relevant Health Maintenance data.            There is no immunization history on file for this patient.  Last Completed Mammogram       This patient has no relevant Health Maintenance data.              FAMILY HISTORY:  Family History   Problem Relation Age of Onset    No Known Problems Mother     No Known Problems Father     Liver cancer Sister     Colon cancer Neg Hx      SOCIAL HISTORY:  Social History     Socioeconomic History    Marital status:    Tobacco Use    Smoking status: Never    Smokeless tobacco: Never   Vaping Use    Vaping status: Never Used   Substance and Sexual Activity    Alcohol use: Never    Drug use: Never    Sexual activity: Defer       REVIEW OF SYSTEMS:  Review of Systems   Constitutional:  Positive for appetite change (\"less on chemo\". Declines appetite stimulant). Negative for activity change (\"about the same'), fatigue and unexpected weight loss.        " "Manages his ADLs, to include chores, \"some\" but not running errands and no longer drives. Is up and about, \"<50%.\" Ambulates with a cane at home    Chemo tolerance:  Fatigue.  \"More than usual.\" No cold associated neuropathy of the hands, but denies loss of fine motor function.  \"Not more than usual.\" Soft stools and watery stools \"about 4x yesterday\".  Uses Imodium, \"maybe 4x/day.\"  No mouth sores, no nausea, no vomiting, no skin changes.   HENT: Negative.     Eyes: Negative.    Respiratory:  Negative for shortness of breath (Baseline exertional dyspnea but no SOB with routine activities).         SARAHI NOT on CPAP   Cardiovascular: Negative.    Gastrointestinal:  Positive for diarrhea (Loose stools particularly at night ~ 2-4x/day.  Uses Imodium ~ 4x/day.  Uses liners). Negative for abdominal distention, abdominal pain, anal bleeding, blood in stool (with BMs. \"not like it was before\"), constipation, nausea, rectal pain and vomiting.   Endocrine: Negative.    Genitourinary: Negative.    Musculoskeletal:  Positive for arthralgias (Chronic pains in hands, right ankle (prior injury)neck and lower back), back pain (Chronic), myalgias (with ambulation, \"50 feet\") and neck stiffness (Chronic \"stiffness\"). Negative for neck pain.   Skin: Negative.    Neurological: Negative.    Hematological: Negative.    Psychiatric/Behavioral: Negative.           /60   Pulse 83   Temp 97 °F (36.1 °C)   Resp 18   Ht 177.8 cm (70\")   Wt 80.3 kg (177 lb)   SpO2 95%   BMI 25.40 kg/m²  Body surface area is 1.98 meters squared.  Pain Score    06/26/24 1025   PainSc:   2           Physical Exam  Vitals and nursing note reviewed.   Constitutional:       Comments: Pleasant, cooperative, heavyset middle-aged elderly male.  Brought in by wheelchair but ambulatory in the room with a cane.  ECOG 1-2 (same).      Has lost 7 lb (no weight changes at his prior visit) since the last visit   HENT:      Head: Normocephalic and atraumatic. " "  Eyes:      General: No scleral icterus.     Extraocular Movements: Extraocular movements intact.      Pupils: Pupils are equal, round, and reactive to light.   Cardiovascular:      Rate and Rhythm: Normal rate.   Pulmonary:      Effort: Pulmonary effort is normal.      Comments: Port in the right upper chest is well seated  Abdominal:      Palpations: Abdomen is soft. There is no mass (RUQ fullness/hepatomegaly?).      Tenderness: There is no abdominal tenderness.   Musculoskeletal:         General: Swelling present. Normal range of motion.      Cervical back: Normal range of motion.      Right lower leg: Edema (1+ (chronic)- wearing an ankle brace for prior ankle injury) present.      Left lower leg: Edema (1+ (chronic)) present.   Lymphadenopathy:      Cervical: No cervical adenopathy.   Skin:     General: Skin is warm.   Neurological:      General: No focal deficit present.      Mental Status: He is alert and oriented to person, place, and time.   Psychiatric:         Mood and Affect: Mood normal.         Behavior: Behavior normal.         Thought Content: Thought content normal.         Assessment:   Rectal adenocarcinoma  --Original tumor stage:  TNM III (cT3, cN1, M0). MSI by PCR: MSI-stable (LORENZO)  --Original tumor burden:  --11/9/23- Colonoscopy:  Rectal mass.  Malignant partially obstructing tumor from 4 to 11 cm proximal to the anus.  Biopsied.  Exam otherwise normal on direct and retroflexion views.  Final diagnosis:Large intestine, designated \"rectal mass\".  Invasive adenocarcinoma, well to moderately differentiated.  --12/1/23- CT CAP- No mets in chest.  Thickening of the wall of the rectum consistent with the history of rectal neoplasm. The outer wall of the rectum is somewhat ill-defined. Therefore, there may be some extension through the wall. In addition, the fat plane between the rectum and prostate is obscured suggesting possible extension of tumor into this area. There is a 5 mm lymph node in " the left perirectal fat image 71 series 3. There is an additional small lymph node more superiorly in the pelvic mesenteric fat measuring 6-7 mm in short axis diameter. No other evidence of mets.  --12/5/23- flexible sigmoidoscopy with rectal EUS, Dr. Calderon Cano  Known rectal mass was 80% circumferential and invasive appearing with ulceration extending from 4 cm to 14 cm  Endosonography revealed an irregular appearing heterogeneous mass measuring over 4.4 cm in greatest dimension with evidence of invasion through and obliteration of the muscularis propria.  Irregular borders were noted with questionable spread through the rectal wall.  The lesion did not appear to invade the prostate.  No definite lymphadenopathy was appreciated.    --Complications of tumor:  --Rectal bleeding, anemia  --Tumor status:  -- Neoadjuvant FOLFOX - completed C6, 4/22/24  -  2/7/24- Consult [facility], Dr. Kelly-TOBI/P: Mr Tellez is a 89 yo male being treated with chemotherapy for a T3N0M0 rectal cancer. I am not able to scope him today since he is in the middle of his chemotherapy. He was scheduled to come see me however cancelled the appt thinking it was not necessary at that time. I have explained to him the importance of me examining him prior to completion of his treatment so as to better plan if surgery will be necessary and if it is what surgery would be needed. I will have him come back after he has completed his chemotherapy but before he starts his radiation treatment. He has voiced understanding of the importance of this and will be sure to come to his next appt.   PLAN  Contact Dr Abraham to see when is will chemo be completed  Plan for rtc after completion of chemo but before starting xrt for flex sig  Will see back then again after he has completed his xrt.   - 5/1/24- Follow-up Dr. Kelly-TOBI/P: Mr Tellez is a 89 yo male who has completed his chemotherapy for treatment of his rectal cancer and is here today for evaluation. I have  not seen the tumor as he missed his appt that was made for him prior to starting treatment. When I saw him last he was in the middle of chemo so not advisable to do a scope at that time. He is now between his chemo and xrt treatments. Exam today showed a large mass, not obstructing, extending from the left posterior quadrant , around the anterior midline over to the right anterior quadrant. He will start his chemo /xrt soon. At this time, the lesion is not involving his sphincters however, it would probably be in his best interest to do an APR should a surgery be considered. If continuity was restored he would require an DLI and that would then require a second operation in a 89 yo patient which would not be advisable. If he has a very good response, possibly no surgery would be needed.   Plan  Patient to return one month after completion of his chemo/xrt.   - 5/6/24- Follow-up Dr. Tim Tellez returns to the clinic today to begin radiation therapy for Stage III (cT3 cN1 M0), well to moderately differentiated rectal adenocarcinoma. This time I recommend a course of pelvic radiation along with low-dose chemotherapy with continuous infusion 5-FU.  I anticipate a dose of 5040 cGy in 28 treatment fractions to the pelvis along with 5-FU chemotherapy  -- CIV 5-FU beginning 6/3/24; 6/10/24; 6/17/24; 6/24/24  -- Concurrent RT beginning 6/3/24- present (18/28/fx)    2.   Anemia. Iron deficiency from rectal bleeding/malignancy/chemo/RT  --11/20/23- fe 46 (L), fe sat 13% (L), ferritin 24 (L)  --Venofer x 4-12/4-12/27/23  --Hgb 13.1; MCV 95.1, 6/24/24 (prior linn:  Hgb 11.4)  3.   Thrombocytopenia. Chemo+RT.   4.   Diabetes on insulin  5.   Arthritis  6.   BPH  7.   SARAHI  8.   Advanced age  9.   Borderline PS (ECOG 1-2)    Plan:  Apprised of labs, 6/17/24 -6/24/24 with stable anemia and platelets 126,000 otherwise normal CBC, glucose 128, aphos 102 (prior: ) otherwise normal CMP  Note MMR could not be done due to  lack of material.  While MSI by PCR: MSI-stable (LORENZO)  Chemo tolerance.  Grade 1 -2 diarrhea/fatigue- has received diarrhea teaching sheets.   Prior review follow-up Dr. Kelly of [facility], 5/1/24 (above).  Plan:  follow-up 1 month after chemo/RT  Follow-up Dr. Barber. 5/6/24--Juliocesar Tellez returns to the clinic today to begin radiation therapy for Stage III (cT3 cN1 M0), well to moderately differentiated rectal adenocarcinoma. This time I recommend a course of pelvic radiation along with low-dose chemotherapy with continuous infusion 5-FU.  I anticipate a dose of 5040 cGy in 28 treatment fractions to the pelvis along with 5-FU chemotherapy  Previously reviewed consult, 12/5/23- Dr. Calderon Cano of GI. Flex sig (above).  No definite adenopathy  Review NCCN guidelines 2.2023 rectal cancer-for clinical stage T4, N any - pMMR/LORENZO- primary treatment - total neoadjuvant therapy.  FOLFOX or [place] ox- 12-16 weeks)-long course chemo/RT with capecitabine or infusional [id number] (best tumor response 8 weeks after completion of RT)- reassess for surgery (Transabdominal resection or of CR consider observation.  If resection contraindciated-systemic therapy; If dMMR/MSI-H- primary treatment- NEOADJUVANT/DEFINITIVE IMMUNOTHERAPY (PREFERRED)- IO therapy for up to 6 months (nivolumab or pembrolizumab); OR Total neoadjuvant therapy (as above)    Potential toxicities of 5-FU previously discussed (to include but not limited to: Myelosuppression with pancytopenia, cardiotoxicity, angina, MI, arrhythmia, heart failure, QT prolongation, encephalopathy, neurotoxicity, acute cerebellar syndrome, severe diarrhea, severe mucositis, GI ulcer, hand-foot syndrome, anaphylaxis, diarrhea, anorexia, mucositis, stomatitis, Xofigo pharyngitis, confusion, disorientation, euphoria, ataxia, visual disturbance, nystagmus, headache, alopecia, xeroderma, skin fissures, photosensitivity, erythema, skin pigmentation, dermatitis, lacrimation, photophobia,  thrombophlebitis, epistaxis, nail changes).  Questions answered.  He agrees to press on with therapy.    Schedule treatment (coordinate start with RT) be administered Monday through Friday- C4, 6/24/24; C5, 7/1/24; C6, 7/8/24    5- mg/m2/24h (BSA = ; TD=  mg/24h) IVCI over 24 hours x5 days (Total dose = mg on days 1 through 5) x 6 weeks.     10.  Premedicate with:   Benadryl 25 mg IV on day 1 of each week   Decadron 10 mg IV on day 1 of each week         11.  Upon initiation of chemo:  CMP and CBC weekly, with Procrit 40,000 if hemoglobin less than 10 and hematocrit less than 30 and Zarxio/Neupogen 480 mcg subcutaneously x5 days if ANC less than 1.0 at [facility]     12.  Rx:   Zofran 8 mg p.o. 3 times daily #30   Imodium 2 mg po as directed       13.  Return to office in 5-6 weeks with preoffice CBC, diff and CMP-    I spent ~44 minutes caring for Juliocesar on this date of service. This time includes time spent by me in the following activities: preparing for the visit, reviewing tests, performing a medically appropriate examination and/or evaluation, counseling and educating the patient/family/caregiver, ordering medications, tests, or procedures and documenting information in the medical record.

## 2024-06-19 ENCOUNTER — HOSPITAL ENCOUNTER (OUTPATIENT)
Dept: RADIATION ONCOLOGY | Facility: HOSPITAL | Age: 89
Setting detail: RADIATION/ONCOLOGY SERIES
Discharge: HOME | End: 2024-06-19
Payer: MEDICARE

## 2024-06-19 DIAGNOSIS — C20 RECTAL ADENOCARCINOMA: Primary | ICD-10-CM

## 2024-06-19 LAB
RAD ONC ARIA COURSE ID: NORMAL
RAD ONC ARIA COURSE LAST TREATMENT DATE: NORMAL
RAD ONC ARIA COURSE START DATE: NORMAL
RAD ONC ARIA COURSE TREATMENT ELAPSED DAYS: 16
RAD ONC ARIA FIRST TREATMENT DATE: NORMAL
RAD ONC ARIA PLAN FRACTIONS TREATED TO DATE: 13
RAD ONC ARIA PLAN ID: NORMAL
RAD ONC ARIA PLAN PRESCRIBED DOSE PER FRACTION: 1.8 GY
RAD ONC ARIA PLAN PRIMARY REFERENCE POINT: NORMAL
RAD ONC ARIA PLAN TOTAL FRACTIONS PRESCRIBED: 25
RAD ONC ARIA PLAN TOTAL PRESCRIBED DOSE: 4500 CGY
RAD ONC ARIA REFERENCE POINT DOSAGE GIVEN TO DATE: 23.4 GY
RAD ONC ARIA REFERENCE POINT ID: NORMAL
RAD ONC ARIA REFERENCE POINT SESSION DOSAGE GIVEN: 1.8 GY

## 2024-06-19 PROCEDURE — 77412 RADIATION TX DELIVERY LVL 3: CPT | Performed by: RADIOLOGY

## 2024-06-19 RX ORDER — DIPHENOXYLATE HYDROCHLORIDE AND ATROPINE SULFATE 2.5; .025 MG/1; MG/1
1 TABLET ORAL 4 TIMES DAILY PRN
Qty: 90 TABLET | Refills: 0 | Status: SHIPPED | OUTPATIENT
Start: 2024-06-19

## 2024-06-19 RX ORDER — FAMOTIDINE 10 MG/ML
20 INJECTION, SOLUTION INTRAVENOUS AS NEEDED
OUTPATIENT
Start: 2024-06-25

## 2024-06-19 RX ORDER — DIPHENHYDRAMINE HYDROCHLORIDE 50 MG/ML
50 INJECTION INTRAMUSCULAR; INTRAVENOUS AS NEEDED
OUTPATIENT
Start: 2024-06-25

## 2024-06-20 ENCOUNTER — HOSPITAL ENCOUNTER (OUTPATIENT)
Dept: RADIATION ONCOLOGY | Facility: HOSPITAL | Age: 89
Setting detail: RADIATION/ONCOLOGY SERIES
Discharge: HOME | End: 2024-06-20
Payer: MEDICARE

## 2024-06-20 LAB
RAD ONC ARIA COURSE ID: NORMAL
RAD ONC ARIA COURSE LAST TREATMENT DATE: NORMAL
RAD ONC ARIA COURSE START DATE: NORMAL
RAD ONC ARIA COURSE TREATMENT ELAPSED DAYS: 17
RAD ONC ARIA FIRST TREATMENT DATE: NORMAL
RAD ONC ARIA PLAN FRACTIONS TREATED TO DATE: 14
RAD ONC ARIA PLAN ID: NORMAL
RAD ONC ARIA PLAN PRESCRIBED DOSE PER FRACTION: 1.8 GY
RAD ONC ARIA PLAN PRIMARY REFERENCE POINT: NORMAL
RAD ONC ARIA PLAN TOTAL FRACTIONS PRESCRIBED: 25
RAD ONC ARIA PLAN TOTAL PRESCRIBED DOSE: 4500 CGY
RAD ONC ARIA REFERENCE POINT DOSAGE GIVEN TO DATE: 25.2 GY
RAD ONC ARIA REFERENCE POINT ID: NORMAL
RAD ONC ARIA REFERENCE POINT SESSION DOSAGE GIVEN: 1.8 GY

## 2024-06-20 PROCEDURE — 77412 RADIATION TX DELIVERY LVL 3: CPT | Performed by: RADIOLOGY

## 2024-06-21 ENCOUNTER — INFUSION (OUTPATIENT)
Dept: ONCOLOGY | Facility: HOSPITAL | Age: 89
End: 2024-06-21
Payer: MEDICARE

## 2024-06-21 ENCOUNTER — HOSPITAL ENCOUNTER (OUTPATIENT)
Dept: RADIATION ONCOLOGY | Facility: HOSPITAL | Age: 89
Setting detail: RADIATION/ONCOLOGY SERIES
Discharge: HOME | End: 2024-06-21
Payer: MEDICARE

## 2024-06-21 VITALS
SYSTOLIC BLOOD PRESSURE: 130 MMHG | HEART RATE: 80 BPM | TEMPERATURE: 97.6 F | DIASTOLIC BLOOD PRESSURE: 45 MMHG | OXYGEN SATURATION: 96 % | RESPIRATION RATE: 18 BRPM

## 2024-06-21 DIAGNOSIS — Z45.2 ENCOUNTER FOR CARE RELATED TO VASCULAR ACCESS PORT: ICD-10-CM

## 2024-06-21 DIAGNOSIS — C20 RECTAL ADENOCARCINOMA: Primary | ICD-10-CM

## 2024-06-21 LAB
RAD ONC ARIA COURSE ID: NORMAL
RAD ONC ARIA COURSE LAST TREATMENT DATE: NORMAL
RAD ONC ARIA COURSE START DATE: NORMAL
RAD ONC ARIA COURSE TREATMENT ELAPSED DAYS: 18
RAD ONC ARIA FIRST TREATMENT DATE: NORMAL
RAD ONC ARIA PLAN FRACTIONS TREATED TO DATE: 15
RAD ONC ARIA PLAN ID: NORMAL
RAD ONC ARIA PLAN PRESCRIBED DOSE PER FRACTION: 1.8 GY
RAD ONC ARIA PLAN PRIMARY REFERENCE POINT: NORMAL
RAD ONC ARIA PLAN TOTAL FRACTIONS PRESCRIBED: 25
RAD ONC ARIA PLAN TOTAL PRESCRIBED DOSE: 4500 CGY
RAD ONC ARIA REFERENCE POINT DOSAGE GIVEN TO DATE: 27 GY
RAD ONC ARIA REFERENCE POINT ID: NORMAL
RAD ONC ARIA REFERENCE POINT SESSION DOSAGE GIVEN: 1.8 GY

## 2024-06-21 PROCEDURE — 25010000002 HEPARIN LOCK FLUSH PER 10 UNITS: Performed by: INTERNAL MEDICINE

## 2024-06-21 PROCEDURE — 77412 RADIATION TX DELIVERY LVL 3: CPT | Performed by: RADIOLOGY

## 2024-06-21 PROCEDURE — 77336 RADIATION PHYSICS CONSULT: CPT | Performed by: RADIOLOGY

## 2024-06-21 RX ORDER — HEPARIN SODIUM (PORCINE) LOCK FLUSH IV SOLN 100 UNIT/ML 100 UNIT/ML
500 SOLUTION INTRAVENOUS AS NEEDED
Status: DISCONTINUED | OUTPATIENT
Start: 2024-06-21 | End: 2024-06-21 | Stop reason: HOSPADM

## 2024-06-21 RX ORDER — HEPARIN SODIUM (PORCINE) LOCK FLUSH IV SOLN 100 UNIT/ML 100 UNIT/ML
500 SOLUTION INTRAVENOUS AS NEEDED
Status: CANCELLED | OUTPATIENT
Start: 2024-06-21

## 2024-06-21 RX ORDER — SODIUM CHLORIDE 0.9 % (FLUSH) 0.9 %
10 SYRINGE (ML) INJECTION AS NEEDED
Status: CANCELLED | OUTPATIENT
Start: 2024-06-21

## 2024-06-21 RX ORDER — SODIUM CHLORIDE 0.9 % (FLUSH) 0.9 %
10 SYRINGE (ML) INJECTION AS NEEDED
Status: DISCONTINUED | OUTPATIENT
Start: 2024-06-21 | End: 2024-06-21 | Stop reason: HOSPADM

## 2024-06-21 RX ADMIN — Medication 500 UNITS: at 10:14

## 2024-06-21 RX ADMIN — Medication 10 ML: at 10:14

## 2024-06-24 ENCOUNTER — LAB (OUTPATIENT)
Dept: LAB | Facility: HOSPITAL | Age: 89
End: 2024-06-24
Payer: MEDICARE

## 2024-06-24 ENCOUNTER — INFUSION (OUTPATIENT)
Dept: ONCOLOGY | Facility: HOSPITAL | Age: 89
End: 2024-06-24
Payer: MEDICARE

## 2024-06-24 ENCOUNTER — HOSPITAL ENCOUNTER (OUTPATIENT)
Dept: RADIATION ONCOLOGY | Facility: HOSPITAL | Age: 89
Setting detail: RADIATION/ONCOLOGY SERIES
Discharge: HOME | End: 2024-06-24
Payer: MEDICARE

## 2024-06-24 VITALS
RESPIRATION RATE: 18 BRPM | WEIGHT: 177.4 LBS | TEMPERATURE: 97.4 F | HEIGHT: 70 IN | SYSTOLIC BLOOD PRESSURE: 141 MMHG | OXYGEN SATURATION: 95 % | DIASTOLIC BLOOD PRESSURE: 41 MMHG | HEART RATE: 84 BPM | BODY MASS INDEX: 25.4 KG/M2

## 2024-06-24 DIAGNOSIS — Z45.2 ENCOUNTER FOR CARE RELATED TO VASCULAR ACCESS PORT: ICD-10-CM

## 2024-06-24 DIAGNOSIS — C20 RECTAL ADENOCARCINOMA: Primary | ICD-10-CM

## 2024-06-24 DIAGNOSIS — C20 RECTAL ADENOCARCINOMA: ICD-10-CM

## 2024-06-24 DIAGNOSIS — C20 RECTAL CANCER: ICD-10-CM

## 2024-06-24 LAB
ALBUMIN SERPL-MCNC: 3.6 G/DL (ref 3.5–5.2)
ALBUMIN/GLOB SERPL: 1.2 G/DL
ALP SERPL-CCNC: 102 U/L (ref 39–117)
ALT SERPL W P-5'-P-CCNC: 22 U/L (ref 1–41)
ANION GAP SERPL CALCULATED.3IONS-SCNC: 7 MMOL/L (ref 5–15)
AST SERPL-CCNC: 29 U/L (ref 1–40)
BASOPHILS # BLD AUTO: 0.02 10*3/MM3 (ref 0–0.2)
BASOPHILS NFR BLD AUTO: 0.4 % (ref 0–1.5)
BILIRUB SERPL-MCNC: 0.5 MG/DL (ref 0–1.2)
BUN SERPL-MCNC: 11 MG/DL (ref 8–23)
BUN/CREAT SERPL: 14.5 (ref 7–25)
CALCIUM SPEC-SCNC: 8.9 MG/DL (ref 8.2–9.6)
CHLORIDE SERPL-SCNC: 102 MMOL/L (ref 98–107)
CO2 SERPL-SCNC: 33 MMOL/L (ref 22–29)
CREAT SERPL-MCNC: 0.76 MG/DL (ref 0.76–1.27)
DEPRECATED RDW RBC AUTO: 46.4 FL (ref 37–54)
EGFRCR SERPLBLD CKD-EPI 2021: 84.9 ML/MIN/1.73
EOSINOPHIL # BLD AUTO: 0.29 10*3/MM3 (ref 0–0.4)
EOSINOPHIL NFR BLD AUTO: 5.9 % (ref 0.3–6.2)
ERYTHROCYTE [DISTWIDTH] IN BLOOD BY AUTOMATED COUNT: 14.2 % (ref 12.3–15.4)
GLOBULIN UR ELPH-MCNC: 3 GM/DL
GLUCOSE SERPL-MCNC: 128 MG/DL (ref 65–99)
HCT VFR BLD AUTO: 39 % (ref 37.5–51)
HGB BLD-MCNC: 13.1 G/DL (ref 13–17.7)
IMM GRANULOCYTES # BLD AUTO: 0.06 10*3/MM3 (ref 0–0.05)
IMM GRANULOCYTES NFR BLD AUTO: 1.2 % (ref 0–0.5)
LYMPHOCYTES # BLD AUTO: 1.3 10*3/MM3 (ref 0.7–3.1)
LYMPHOCYTES NFR BLD AUTO: 26.5 % (ref 19.6–45.3)
MCH RBC QN AUTO: 32 PG (ref 26.6–33)
MCHC RBC AUTO-ENTMCNC: 33.6 G/DL (ref 31.5–35.7)
MCV RBC AUTO: 95.1 FL (ref 79–97)
MONOCYTES # BLD AUTO: 0.51 10*3/MM3 (ref 0.1–0.9)
MONOCYTES NFR BLD AUTO: 10.4 % (ref 5–12)
NEUTROPHILS NFR BLD AUTO: 2.73 10*3/MM3 (ref 1.7–7)
NEUTROPHILS NFR BLD AUTO: 55.6 % (ref 42.7–76)
NRBC BLD AUTO-RTO: 0 /100 WBC (ref 0–0.2)
PLATELET # BLD AUTO: 126 10*3/MM3 (ref 140–450)
PMV BLD AUTO: 9 FL (ref 6–12)
POTASSIUM SERPL-SCNC: 4.1 MMOL/L (ref 3.5–5.2)
PROT SERPL-MCNC: 6.6 G/DL (ref 6–8.5)
RAD ONC ARIA COURSE ID: NORMAL
RAD ONC ARIA COURSE LAST TREATMENT DATE: NORMAL
RAD ONC ARIA COURSE START DATE: NORMAL
RAD ONC ARIA COURSE TREATMENT ELAPSED DAYS: 21
RAD ONC ARIA FIRST TREATMENT DATE: NORMAL
RAD ONC ARIA PLAN FRACTIONS TREATED TO DATE: 16
RAD ONC ARIA PLAN ID: NORMAL
RAD ONC ARIA PLAN PRESCRIBED DOSE PER FRACTION: 1.8 GY
RAD ONC ARIA PLAN PRIMARY REFERENCE POINT: NORMAL
RAD ONC ARIA PLAN TOTAL FRACTIONS PRESCRIBED: 25
RAD ONC ARIA PLAN TOTAL PRESCRIBED DOSE: 4500 CGY
RAD ONC ARIA REFERENCE POINT DOSAGE GIVEN TO DATE: 28.8 GY
RAD ONC ARIA REFERENCE POINT ID: NORMAL
RAD ONC ARIA REFERENCE POINT SESSION DOSAGE GIVEN: 1.8 GY
RBC # BLD AUTO: 4.1 10*6/MM3 (ref 4.14–5.8)
SODIUM SERPL-SCNC: 142 MMOL/L (ref 136–145)
WBC NRBC COR # BLD AUTO: 4.91 10*3/MM3 (ref 3.4–10.8)

## 2024-06-24 PROCEDURE — 36415 COLL VENOUS BLD VENIPUNCTURE: CPT

## 2024-06-24 PROCEDURE — 77412 RADIATION TX DELIVERY LVL 3: CPT | Performed by: RADIOLOGY

## 2024-06-24 PROCEDURE — 80053 COMPREHEN METABOLIC PANEL: CPT

## 2024-06-24 PROCEDURE — 25010000002 HEPARIN LOCK FLUSH PER 10 UNITS: Performed by: INTERNAL MEDICINE

## 2024-06-24 PROCEDURE — 25010000002 DEXAMETHASONE PER 1 MG: Performed by: INTERNAL MEDICINE

## 2024-06-24 PROCEDURE — 85025 COMPLETE CBC W/AUTO DIFF WBC: CPT

## 2024-06-24 PROCEDURE — 96374 THER/PROPH/DIAG INJ IV PUSH: CPT

## 2024-06-24 PROCEDURE — 25810000003 SODIUM CHLORIDE 0.9 % SOLUTION: Performed by: INTERNAL MEDICINE

## 2024-06-24 PROCEDURE — 96361 HYDRATE IV INFUSION ADD-ON: CPT

## 2024-06-24 RX ORDER — SODIUM CHLORIDE 0.9 % (FLUSH) 0.9 %
10 SYRINGE (ML) INJECTION AS NEEDED
Status: CANCELLED | OUTPATIENT
Start: 2024-06-24

## 2024-06-24 RX ORDER — DEXAMETHASONE SODIUM PHOSPHATE 4 MG/ML
4 INJECTION, SOLUTION INTRA-ARTICULAR; INTRALESIONAL; INTRAMUSCULAR; INTRAVENOUS; SOFT TISSUE ONCE
Status: CANCELLED
Start: 2024-06-25 | End: 2024-06-25

## 2024-06-24 RX ORDER — SODIUM CHLORIDE 9 MG/ML
500 INJECTION, SOLUTION INTRAVENOUS CONTINUOUS
Status: CANCELLED
Start: 2024-06-25

## 2024-06-24 RX ORDER — SODIUM CHLORIDE 9 MG/ML
500 INJECTION, SOLUTION INTRAVENOUS CONTINUOUS
Status: CANCELLED
Start: 2024-06-24

## 2024-06-24 RX ORDER — DEXAMETHASONE SODIUM PHOSPHATE 4 MG/ML
4 INJECTION, SOLUTION INTRA-ARTICULAR; INTRALESIONAL; INTRAMUSCULAR; INTRAVENOUS; SOFT TISSUE ONCE
Status: COMPLETED | OUTPATIENT
Start: 2024-06-24 | End: 2024-06-24

## 2024-06-24 RX ORDER — HEPARIN SODIUM (PORCINE) LOCK FLUSH IV SOLN 100 UNIT/ML 100 UNIT/ML
500 SOLUTION INTRAVENOUS AS NEEDED
Status: DISCONTINUED | OUTPATIENT
Start: 2024-06-24 | End: 2024-06-24 | Stop reason: HOSPADM

## 2024-06-24 RX ORDER — DEXAMETHASONE SODIUM PHOSPHATE 4 MG/ML
4 INJECTION, SOLUTION INTRA-ARTICULAR; INTRALESIONAL; INTRAMUSCULAR; INTRAVENOUS; SOFT TISSUE ONCE
Status: CANCELLED
Start: 2024-06-24 | End: 2024-06-24

## 2024-06-24 RX ORDER — HEPARIN SODIUM (PORCINE) LOCK FLUSH IV SOLN 100 UNIT/ML 100 UNIT/ML
500 SOLUTION INTRAVENOUS AS NEEDED
Status: CANCELLED | OUTPATIENT
Start: 2024-06-24

## 2024-06-24 RX ORDER — SODIUM CHLORIDE 0.9 % (FLUSH) 0.9 %
10 SYRINGE (ML) INJECTION AS NEEDED
Status: DISCONTINUED | OUTPATIENT
Start: 2024-06-24 | End: 2024-06-24 | Stop reason: HOSPADM

## 2024-06-24 RX ORDER — SODIUM CHLORIDE 9 MG/ML
500 INJECTION, SOLUTION INTRAVENOUS CONTINUOUS
Status: DISCONTINUED | OUTPATIENT
Start: 2024-06-24 | End: 2024-06-24 | Stop reason: HOSPADM

## 2024-06-24 RX ADMIN — DEXAMETHASONE SODIUM PHOSPHATE 4 MG: 4 INJECTION INTRA-ARTICULAR; INTRALESIONAL; INTRAMUSCULAR; INTRAVENOUS; SOFT TISSUE at 11:12

## 2024-06-24 RX ADMIN — Medication 500 UNITS: at 13:02

## 2024-06-24 RX ADMIN — Medication 10 ML: at 13:02

## 2024-06-24 RX ADMIN — SODIUM CHLORIDE 500 ML/HR: 900 INJECTION, SOLUTION INTRAVENOUS at 11:05

## 2024-06-24 NOTE — PROGRESS NOTES
"Message to office : \"Juliocesar Tellez 12/19/1932 c/o worsening abdominal pain and fatigue. states worsening diarrhea despite taking imodium, and no appetite. please advise. \"  "

## 2024-06-25 ENCOUNTER — INFUSION (OUTPATIENT)
Dept: ONCOLOGY | Facility: HOSPITAL | Age: 89
End: 2024-06-25
Payer: MEDICARE

## 2024-06-25 ENCOUNTER — HOSPITAL ENCOUNTER (OUTPATIENT)
Dept: RADIATION ONCOLOGY | Facility: HOSPITAL | Age: 89
Setting detail: RADIATION/ONCOLOGY SERIES
Discharge: HOME | End: 2024-06-25
Payer: MEDICARE

## 2024-06-25 ENCOUNTER — TELEPHONE (OUTPATIENT)
Dept: ONCOLOGY | Facility: CLINIC | Age: 89
End: 2024-06-25
Payer: MEDICARE

## 2024-06-25 VITALS
OXYGEN SATURATION: 92 % | HEART RATE: 84 BPM | RESPIRATION RATE: 18 BRPM | TEMPERATURE: 97.4 F | SYSTOLIC BLOOD PRESSURE: 154 MMHG | DIASTOLIC BLOOD PRESSURE: 58 MMHG

## 2024-06-25 DIAGNOSIS — Z45.2 ENCOUNTER FOR CARE RELATED TO VASCULAR ACCESS PORT: ICD-10-CM

## 2024-06-25 DIAGNOSIS — C20 RECTAL ADENOCARCINOMA: Primary | ICD-10-CM

## 2024-06-25 DIAGNOSIS — C20 RECTAL CANCER: ICD-10-CM

## 2024-06-25 LAB
RAD ONC ARIA COURSE ID: NORMAL
RAD ONC ARIA COURSE LAST TREATMENT DATE: NORMAL
RAD ONC ARIA COURSE START DATE: NORMAL
RAD ONC ARIA COURSE TREATMENT ELAPSED DAYS: 22
RAD ONC ARIA FIRST TREATMENT DATE: NORMAL
RAD ONC ARIA PLAN FRACTIONS TREATED TO DATE: 17
RAD ONC ARIA PLAN ID: NORMAL
RAD ONC ARIA PLAN PRESCRIBED DOSE PER FRACTION: 1.8 GY
RAD ONC ARIA PLAN PRIMARY REFERENCE POINT: NORMAL
RAD ONC ARIA PLAN TOTAL FRACTIONS PRESCRIBED: 25
RAD ONC ARIA PLAN TOTAL PRESCRIBED DOSE: 4500 CGY
RAD ONC ARIA REFERENCE POINT DOSAGE GIVEN TO DATE: 30.6 GY
RAD ONC ARIA REFERENCE POINT ID: NORMAL
RAD ONC ARIA REFERENCE POINT SESSION DOSAGE GIVEN: 1.8 GY

## 2024-06-25 PROCEDURE — 77417 THER RADIOLOGY PORT IMAGE(S): CPT | Performed by: RADIOLOGY

## 2024-06-25 PROCEDURE — 96375 TX/PRO/DX INJ NEW DRUG ADDON: CPT

## 2024-06-25 PROCEDURE — 25010000002 DIPHENHYDRAMINE PER 50 MG: Performed by: INTERNAL MEDICINE

## 2024-06-25 PROCEDURE — 77412 RADIATION TX DELIVERY LVL 3: CPT | Performed by: RADIOLOGY

## 2024-06-25 PROCEDURE — G0498 CHEMO EXTEND IV INFUS W/PUMP: HCPCS

## 2024-06-25 PROCEDURE — 96367 TX/PROPH/DG ADDL SEQ IV INF: CPT

## 2024-06-25 PROCEDURE — 96365 THER/PROPH/DIAG IV INF INIT: CPT

## 2024-06-25 PROCEDURE — 25810000003 SODIUM CHLORIDE 0.9 % SOLUTION: Performed by: INTERNAL MEDICINE

## 2024-06-25 PROCEDURE — 96361 HYDRATE IV INFUSION ADD-ON: CPT

## 2024-06-25 PROCEDURE — 25010000002 FLUOROURACIL PER 500 MG: Performed by: INTERNAL MEDICINE

## 2024-06-25 PROCEDURE — 25010000002 DEXAMETHASONE PER 1 MG: Performed by: INTERNAL MEDICINE

## 2024-06-25 RX ORDER — FAMOTIDINE 10 MG/ML
20 INJECTION, SOLUTION INTRAVENOUS AS NEEDED
Status: DISCONTINUED | OUTPATIENT
Start: 2024-06-25 | End: 2024-06-25 | Stop reason: HOSPADM

## 2024-06-25 RX ORDER — SODIUM CHLORIDE 0.9 % (FLUSH) 0.9 %
10 SYRINGE (ML) INJECTION AS NEEDED
Status: CANCELLED | OUTPATIENT
Start: 2024-06-25

## 2024-06-25 RX ORDER — SODIUM CHLORIDE 0.9 % (FLUSH) 0.9 %
10 SYRINGE (ML) INJECTION AS NEEDED
Status: DISCONTINUED | OUTPATIENT
Start: 2024-06-25 | End: 2024-06-25 | Stop reason: HOSPADM

## 2024-06-25 RX ORDER — SODIUM CHLORIDE 9 MG/ML
500 INJECTION, SOLUTION INTRAVENOUS CONTINUOUS
Status: DISCONTINUED | OUTPATIENT
Start: 2024-06-25 | End: 2024-06-25 | Stop reason: HOSPADM

## 2024-06-25 RX ORDER — HEPARIN SODIUM (PORCINE) LOCK FLUSH IV SOLN 100 UNIT/ML 100 UNIT/ML
500 SOLUTION INTRAVENOUS AS NEEDED
Status: DISCONTINUED | OUTPATIENT
Start: 2024-06-25 | End: 2024-06-25 | Stop reason: HOSPADM

## 2024-06-25 RX ORDER — HEPARIN SODIUM (PORCINE) LOCK FLUSH IV SOLN 100 UNIT/ML 100 UNIT/ML
500 SOLUTION INTRAVENOUS AS NEEDED
Status: CANCELLED | OUTPATIENT
Start: 2024-06-25

## 2024-06-25 RX ORDER — DEXAMETHASONE SODIUM PHOSPHATE 4 MG/ML
4 INJECTION, SOLUTION INTRA-ARTICULAR; INTRALESIONAL; INTRAMUSCULAR; INTRAVENOUS; SOFT TISSUE ONCE
Status: COMPLETED | OUTPATIENT
Start: 2024-06-25 | End: 2024-06-25

## 2024-06-25 RX ORDER — DIPHENHYDRAMINE HYDROCHLORIDE 50 MG/ML
50 INJECTION INTRAMUSCULAR; INTRAVENOUS AS NEEDED
Status: DISCONTINUED | OUTPATIENT
Start: 2024-06-25 | End: 2024-06-25 | Stop reason: HOSPADM

## 2024-06-25 RX ADMIN — DIPHENHYDRAMINE HYDROCHLORIDE 25 MG: 50 INJECTION, SOLUTION INTRAMUSCULAR; INTRAVENOUS at 11:12

## 2024-06-25 RX ADMIN — FLUOROURACIL 1360 MG: 50 INJECTION, SOLUTION INTRAVENOUS at 11:54

## 2024-06-25 RX ADMIN — SODIUM CHLORIDE 500 ML/HR: 900 INJECTION, SOLUTION INTRAVENOUS at 09:01

## 2024-06-25 RX ADMIN — DEXAMETHASONE SODIUM PHOSPHATE 4 MG: 4 INJECTION INTRA-ARTICULAR; INTRALESIONAL; INTRAMUSCULAR; INTRAVENOUS; SOFT TISSUE at 09:07

## 2024-06-25 NOTE — TELEPHONE ENCOUNTER
Received call from Nurse [facility]. She calls to report patient Juliocesar Tellez has completed Day 2 IV Fluids. Nurse reports patient seems to feel better today and rates his pain a 3 today vs yesterdays 8.   He continues to deal with persistent rectal pain, gas, and belching but feels this is part of his dx.  Nurse questions to proceed with today's planned Pump On-5-FU?    Relayed all information to Dr Abraham, he recommends to proceed with planned TXT by cutting chemo dosing down and pump off on Friday 6/28/24 if patient feels like TXT today..  Relayed all information to patient Nurse, patient informs Nurse he is ok for TXT today.   Notified [facility] regarding pump dosing and reduction starting today 6/25/24 TXT and pump coming off on Friday 6/28/24.

## 2024-06-26 ENCOUNTER — OFFICE VISIT (OUTPATIENT)
Dept: ONCOLOGY | Facility: CLINIC | Age: 89
End: 2024-06-26
Payer: MEDICARE

## 2024-06-26 ENCOUNTER — HOSPITAL ENCOUNTER (OUTPATIENT)
Dept: RADIATION ONCOLOGY | Facility: HOSPITAL | Age: 89
Setting detail: RADIATION/ONCOLOGY SERIES
Discharge: HOME | End: 2024-06-26
Payer: MEDICARE

## 2024-06-26 ENCOUNTER — LAB (OUTPATIENT)
Dept: LAB | Facility: HOSPITAL | Age: 89
End: 2024-06-26
Payer: MEDICARE

## 2024-06-26 VITALS
RESPIRATION RATE: 18 BRPM | HEART RATE: 83 BPM | WEIGHT: 177 LBS | HEIGHT: 70 IN | SYSTOLIC BLOOD PRESSURE: 130 MMHG | OXYGEN SATURATION: 95 % | DIASTOLIC BLOOD PRESSURE: 60 MMHG | BODY MASS INDEX: 25.34 KG/M2 | TEMPERATURE: 97 F

## 2024-06-26 DIAGNOSIS — C20 RECTAL ADENOCARCINOMA: Primary | ICD-10-CM

## 2024-06-26 DIAGNOSIS — C20 RECTAL ADENOCARCINOMA: ICD-10-CM

## 2024-06-26 LAB
ALBUMIN SERPL-MCNC: 3.5 G/DL (ref 3.5–5.2)
ALBUMIN/GLOB SERPL: 1.3 G/DL
ALP SERPL-CCNC: 93 U/L (ref 39–117)
ALT SERPL W P-5'-P-CCNC: 26 U/L (ref 1–41)
ANION GAP SERPL CALCULATED.3IONS-SCNC: 6 MMOL/L (ref 5–15)
AST SERPL-CCNC: 28 U/L (ref 1–40)
BASOPHILS # BLD AUTO: 0.02 10*3/MM3 (ref 0–0.2)
BASOPHILS NFR BLD AUTO: 0.4 % (ref 0–1.5)
BILIRUB SERPL-MCNC: 0.4 MG/DL (ref 0–1.2)
BUN SERPL-MCNC: 16 MG/DL (ref 8–23)
BUN/CREAT SERPL: 18.2 (ref 7–25)
CALCIUM SPEC-SCNC: 8.5 MG/DL (ref 8.2–9.6)
CHLORIDE SERPL-SCNC: 107 MMOL/L (ref 98–107)
CO2 SERPL-SCNC: 32 MMOL/L (ref 22–29)
CREAT SERPL-MCNC: 0.88 MG/DL (ref 0.76–1.27)
DEPRECATED RDW RBC AUTO: 48.7 FL (ref 37–54)
EGFRCR SERPLBLD CKD-EPI 2021: 81.2 ML/MIN/1.73
EOSINOPHIL # BLD AUTO: 0.02 10*3/MM3 (ref 0–0.4)
EOSINOPHIL NFR BLD AUTO: 0.4 % (ref 0.3–6.2)
ERYTHROCYTE [DISTWIDTH] IN BLOOD BY AUTOMATED COUNT: 14.8 % (ref 12.3–15.4)
GLOBULIN UR ELPH-MCNC: 2.8 GM/DL
GLUCOSE SERPL-MCNC: 209 MG/DL (ref 65–99)
HCT VFR BLD AUTO: 38.8 % (ref 37.5–51)
HGB BLD-MCNC: 12.7 G/DL (ref 13–17.7)
IMM GRANULOCYTES # BLD AUTO: 0.04 10*3/MM3 (ref 0–0.05)
IMM GRANULOCYTES NFR BLD AUTO: 0.7 % (ref 0–0.5)
LYMPHOCYTES # BLD AUTO: 0.75 10*3/MM3 (ref 0.7–3.1)
LYMPHOCYTES NFR BLD AUTO: 13.7 % (ref 19.6–45.3)
MCH RBC QN AUTO: 31.3 PG (ref 26.6–33)
MCHC RBC AUTO-ENTMCNC: 32.7 G/DL (ref 31.5–35.7)
MCV RBC AUTO: 95.6 FL (ref 79–97)
MONOCYTES # BLD AUTO: 0.84 10*3/MM3 (ref 0.1–0.9)
MONOCYTES NFR BLD AUTO: 15.4 % (ref 5–12)
NEUTROPHILS NFR BLD AUTO: 3.8 10*3/MM3 (ref 1.7–7)
NEUTROPHILS NFR BLD AUTO: 69.4 % (ref 42.7–76)
NRBC BLD AUTO-RTO: 0 /100 WBC (ref 0–0.2)
PLATELET # BLD AUTO: 139 10*3/MM3 (ref 140–450)
PMV BLD AUTO: 8.6 FL (ref 6–12)
POTASSIUM SERPL-SCNC: 4.6 MMOL/L (ref 3.5–5.2)
PROT SERPL-MCNC: 6.3 G/DL (ref 6–8.5)
RAD ONC ARIA COURSE ID: NORMAL
RAD ONC ARIA COURSE LAST TREATMENT DATE: NORMAL
RAD ONC ARIA COURSE START DATE: NORMAL
RAD ONC ARIA COURSE TREATMENT ELAPSED DAYS: 23
RAD ONC ARIA FIRST TREATMENT DATE: NORMAL
RAD ONC ARIA PLAN FRACTIONS TREATED TO DATE: 18
RAD ONC ARIA PLAN ID: NORMAL
RAD ONC ARIA PLAN PRESCRIBED DOSE PER FRACTION: 1.8 GY
RAD ONC ARIA PLAN PRIMARY REFERENCE POINT: NORMAL
RAD ONC ARIA PLAN TOTAL FRACTIONS PRESCRIBED: 25
RAD ONC ARIA PLAN TOTAL PRESCRIBED DOSE: 4500 CGY
RAD ONC ARIA REFERENCE POINT DOSAGE GIVEN TO DATE: 32.4 GY
RAD ONC ARIA REFERENCE POINT ID: NORMAL
RAD ONC ARIA REFERENCE POINT SESSION DOSAGE GIVEN: 1.8 GY
RBC # BLD AUTO: 4.06 10*6/MM3 (ref 4.14–5.8)
SODIUM SERPL-SCNC: 145 MMOL/L (ref 136–145)
WBC NRBC COR # BLD AUTO: 5.47 10*3/MM3 (ref 3.4–10.8)

## 2024-06-26 PROCEDURE — 1159F MED LIST DOCD IN RCRD: CPT | Performed by: INTERNAL MEDICINE

## 2024-06-26 PROCEDURE — 1125F AMNT PAIN NOTED PAIN PRSNT: CPT | Performed by: INTERNAL MEDICINE

## 2024-06-26 PROCEDURE — 99215 OFFICE O/P EST HI 40 MIN: CPT | Performed by: INTERNAL MEDICINE

## 2024-06-26 PROCEDURE — 36415 COLL VENOUS BLD VENIPUNCTURE: CPT

## 2024-06-26 PROCEDURE — 80053 COMPREHEN METABOLIC PANEL: CPT

## 2024-06-26 PROCEDURE — 85025 COMPLETE CBC W/AUTO DIFF WBC: CPT

## 2024-06-26 PROCEDURE — 77412 RADIATION TX DELIVERY LVL 3: CPT | Performed by: RADIOLOGY

## 2024-06-26 RX ORDER — DIPHENHYDRAMINE HYDROCHLORIDE 50 MG/ML
50 INJECTION INTRAMUSCULAR; INTRAVENOUS AS NEEDED
Status: CANCELLED | OUTPATIENT
Start: 2024-07-01

## 2024-06-26 RX ORDER — FAMOTIDINE 10 MG/ML
20 INJECTION, SOLUTION INTRAVENOUS AS NEEDED
Status: CANCELLED | OUTPATIENT
Start: 2024-07-01

## 2024-06-27 ENCOUNTER — APPOINTMENT (OUTPATIENT)
Dept: RADIATION ONCOLOGY | Facility: HOSPITAL | Age: 89
End: 2024-06-27
Payer: MEDICARE

## 2024-06-27 PROCEDURE — 77336 RADIATION PHYSICS CONSULT: CPT | Performed by: RADIOLOGY

## 2024-06-28 ENCOUNTER — APPOINTMENT (OUTPATIENT)
Dept: RADIATION ONCOLOGY | Facility: HOSPITAL | Age: 89
End: 2024-06-28
Payer: MEDICARE

## 2024-06-28 ENCOUNTER — INFUSION (OUTPATIENT)
Dept: ONCOLOGY | Facility: HOSPITAL | Age: 89
End: 2024-06-28
Payer: MEDICARE

## 2024-06-28 VITALS
TEMPERATURE: 97.6 F | DIASTOLIC BLOOD PRESSURE: 50 MMHG | OXYGEN SATURATION: 96 % | SYSTOLIC BLOOD PRESSURE: 143 MMHG | RESPIRATION RATE: 18 BRPM | HEART RATE: 83 BPM

## 2024-06-28 DIAGNOSIS — C20 RECTAL ADENOCARCINOMA: Primary | ICD-10-CM

## 2024-06-28 DIAGNOSIS — Z45.2 ENCOUNTER FOR CARE RELATED TO VASCULAR ACCESS PORT: ICD-10-CM

## 2024-06-28 PROCEDURE — 25010000002 HEPARIN LOCK FLUSH PER 10 UNITS: Performed by: INTERNAL MEDICINE

## 2024-06-28 RX ORDER — SODIUM CHLORIDE 0.9 % (FLUSH) 0.9 %
10 SYRINGE (ML) INJECTION AS NEEDED
OUTPATIENT
Start: 2024-06-28

## 2024-06-28 RX ORDER — HEPARIN SODIUM (PORCINE) LOCK FLUSH IV SOLN 100 UNIT/ML 100 UNIT/ML
500 SOLUTION INTRAVENOUS AS NEEDED
OUTPATIENT
Start: 2024-06-28

## 2024-06-28 RX ORDER — SODIUM CHLORIDE 0.9 % (FLUSH) 0.9 %
10 SYRINGE (ML) INJECTION AS NEEDED
Status: DISCONTINUED | OUTPATIENT
Start: 2024-06-28 | End: 2024-06-28 | Stop reason: HOSPADM

## 2024-06-28 RX ORDER — HEPARIN SODIUM (PORCINE) LOCK FLUSH IV SOLN 100 UNIT/ML 100 UNIT/ML
500 SOLUTION INTRAVENOUS AS NEEDED
Status: DISCONTINUED | OUTPATIENT
Start: 2024-06-28 | End: 2024-06-28 | Stop reason: HOSPADM

## 2024-06-28 RX ADMIN — HEPARIN 500 UNITS: 100 SYRINGE at 09:23

## 2024-06-28 RX ADMIN — Medication 10 ML: at 09:23

## 2024-07-01 ENCOUNTER — LAB (OUTPATIENT)
Dept: LAB | Facility: HOSPITAL | Age: 89
End: 2024-07-01
Payer: MEDICARE

## 2024-07-01 ENCOUNTER — HOSPITAL ENCOUNTER (OUTPATIENT)
Dept: RADIATION ONCOLOGY | Facility: HOSPITAL | Age: 89
Setting detail: RADIATION/ONCOLOGY SERIES
End: 2024-07-01
Payer: MEDICARE

## 2024-07-01 ENCOUNTER — HOSPITAL ENCOUNTER (OUTPATIENT)
Dept: RADIATION ONCOLOGY | Facility: HOSPITAL | Age: 89
Setting detail: RADIATION/ONCOLOGY SERIES
Discharge: HOME | End: 2024-07-01
Payer: MEDICARE

## 2024-07-01 ENCOUNTER — INFUSION (OUTPATIENT)
Dept: ONCOLOGY | Facility: HOSPITAL | Age: 89
End: 2024-07-01
Payer: MEDICARE

## 2024-07-01 VITALS
RESPIRATION RATE: 20 BRPM | WEIGHT: 179.6 LBS | TEMPERATURE: 97.5 F | BODY MASS INDEX: 25.71 KG/M2 | HEIGHT: 70 IN | DIASTOLIC BLOOD PRESSURE: 47 MMHG | HEART RATE: 75 BPM | OXYGEN SATURATION: 94 % | SYSTOLIC BLOOD PRESSURE: 135 MMHG

## 2024-07-01 DIAGNOSIS — C20 RECTAL ADENOCARCINOMA: Primary | ICD-10-CM

## 2024-07-01 DIAGNOSIS — C20 RECTAL ADENOCARCINOMA: ICD-10-CM

## 2024-07-01 LAB
ALBUMIN SERPL-MCNC: 3.5 G/DL (ref 3.5–5.2)
ALBUMIN/GLOB SERPL: 1.2 G/DL
ALP SERPL-CCNC: 90 U/L (ref 39–117)
ALT SERPL W P-5'-P-CCNC: 23 U/L (ref 1–41)
ANION GAP SERPL CALCULATED.3IONS-SCNC: 6 MMOL/L (ref 5–15)
AST SERPL-CCNC: 27 U/L (ref 1–40)
BASOPHILS # BLD AUTO: 0.03 10*3/MM3 (ref 0–0.2)
BASOPHILS NFR BLD AUTO: 0.4 % (ref 0–1.5)
BILIRUB SERPL-MCNC: 0.4 MG/DL (ref 0–1.2)
BUN SERPL-MCNC: 16 MG/DL (ref 8–23)
BUN/CREAT SERPL: 19.3 (ref 7–25)
CALCIUM SPEC-SCNC: 8.6 MG/DL (ref 8.2–9.6)
CHLORIDE SERPL-SCNC: 103 MMOL/L (ref 98–107)
CO2 SERPL-SCNC: 34 MMOL/L (ref 22–29)
CREAT SERPL-MCNC: 0.83 MG/DL (ref 0.76–1.27)
DEPRECATED RDW RBC AUTO: 50.2 FL (ref 37–54)
EGFRCR SERPLBLD CKD-EPI 2021: 82.6 ML/MIN/1.73
EOSINOPHIL # BLD AUTO: 0.33 10*3/MM3 (ref 0–0.4)
EOSINOPHIL NFR BLD AUTO: 4.5 % (ref 0.3–6.2)
ERYTHROCYTE [DISTWIDTH] IN BLOOD BY AUTOMATED COUNT: 15 % (ref 12.3–15.4)
GLOBULIN UR ELPH-MCNC: 2.9 GM/DL
GLUCOSE SERPL-MCNC: 142 MG/DL (ref 65–99)
HCT VFR BLD AUTO: 39.6 % (ref 37.5–51)
HGB BLD-MCNC: 13 G/DL (ref 13–17.7)
IMM GRANULOCYTES # BLD AUTO: 0.05 10*3/MM3 (ref 0–0.05)
IMM GRANULOCYTES NFR BLD AUTO: 0.7 % (ref 0–0.5)
LYMPHOCYTES # BLD AUTO: 0.84 10*3/MM3 (ref 0.7–3.1)
LYMPHOCYTES NFR BLD AUTO: 11.4 % (ref 19.6–45.3)
MCH RBC QN AUTO: 32 PG (ref 26.6–33)
MCHC RBC AUTO-ENTMCNC: 32.8 G/DL (ref 31.5–35.7)
MCV RBC AUTO: 97.5 FL (ref 79–97)
MONOCYTES # BLD AUTO: 0.79 10*3/MM3 (ref 0.1–0.9)
MONOCYTES NFR BLD AUTO: 10.7 % (ref 5–12)
NEUTROPHILS NFR BLD AUTO: 5.35 10*3/MM3 (ref 1.7–7)
NEUTROPHILS NFR BLD AUTO: 72.3 % (ref 42.7–76)
NRBC BLD AUTO-RTO: 0 /100 WBC (ref 0–0.2)
PLATELET # BLD AUTO: 149 10*3/MM3 (ref 140–450)
PMV BLD AUTO: 8.7 FL (ref 6–12)
POTASSIUM SERPL-SCNC: 3.9 MMOL/L (ref 3.5–5.2)
PROT SERPL-MCNC: 6.4 G/DL (ref 6–8.5)
RAD ONC ARIA COURSE ID: NORMAL
RAD ONC ARIA COURSE LAST TREATMENT DATE: NORMAL
RAD ONC ARIA COURSE START DATE: NORMAL
RAD ONC ARIA COURSE TREATMENT ELAPSED DAYS: 28
RAD ONC ARIA FIRST TREATMENT DATE: NORMAL
RAD ONC ARIA PLAN FRACTIONS TREATED TO DATE: 19
RAD ONC ARIA PLAN ID: NORMAL
RAD ONC ARIA PLAN PRESCRIBED DOSE PER FRACTION: 1.8 GY
RAD ONC ARIA PLAN PRIMARY REFERENCE POINT: NORMAL
RAD ONC ARIA PLAN TOTAL FRACTIONS PRESCRIBED: 25
RAD ONC ARIA PLAN TOTAL PRESCRIBED DOSE: 4500 CGY
RAD ONC ARIA REFERENCE POINT DOSAGE GIVEN TO DATE: 34.2 GY
RAD ONC ARIA REFERENCE POINT ID: NORMAL
RAD ONC ARIA REFERENCE POINT SESSION DOSAGE GIVEN: 1.8 GY
RBC # BLD AUTO: 4.06 10*6/MM3 (ref 4.14–5.8)
SODIUM SERPL-SCNC: 143 MMOL/L (ref 136–145)
WBC NRBC COR # BLD AUTO: 7.39 10*3/MM3 (ref 3.4–10.8)

## 2024-07-01 PROCEDURE — 25810000003 SODIUM CHLORIDE 0.9 % SOLUTION: Performed by: INTERNAL MEDICINE

## 2024-07-01 PROCEDURE — 36415 COLL VENOUS BLD VENIPUNCTURE: CPT

## 2024-07-01 PROCEDURE — 77412 RADIATION TX DELIVERY LVL 3: CPT | Performed by: RADIOLOGY

## 2024-07-01 PROCEDURE — 25010000002 DIPHENHYDRAMINE PER 50 MG: Performed by: INTERNAL MEDICINE

## 2024-07-01 PROCEDURE — 96365 THER/PROPH/DIAG IV INF INIT: CPT

## 2024-07-01 PROCEDURE — 80053 COMPREHEN METABOLIC PANEL: CPT

## 2024-07-01 PROCEDURE — 25010000002 FLUOROURACIL PER 500 MG: Performed by: INTERNAL MEDICINE

## 2024-07-01 PROCEDURE — 85025 COMPLETE CBC W/AUTO DIFF WBC: CPT

## 2024-07-01 PROCEDURE — G0498 CHEMO EXTEND IV INFUS W/PUMP: HCPCS

## 2024-07-01 RX ORDER — SODIUM CHLORIDE 9 MG/ML
250 INJECTION, SOLUTION INTRAVENOUS ONCE
Status: COMPLETED | OUTPATIENT
Start: 2024-07-01 | End: 2024-07-01

## 2024-07-01 RX ORDER — DIPHENHYDRAMINE HYDROCHLORIDE 50 MG/ML
50 INJECTION INTRAMUSCULAR; INTRAVENOUS AS NEEDED
Status: CANCELLED | OUTPATIENT
Start: 2024-07-08

## 2024-07-01 RX ORDER — DIPHENHYDRAMINE HYDROCHLORIDE 50 MG/ML
50 INJECTION INTRAMUSCULAR; INTRAVENOUS AS NEEDED
Status: DISCONTINUED | OUTPATIENT
Start: 2024-07-01 | End: 2024-07-01 | Stop reason: HOSPADM

## 2024-07-01 RX ORDER — FAMOTIDINE 10 MG/ML
20 INJECTION, SOLUTION INTRAVENOUS AS NEEDED
Status: CANCELLED | OUTPATIENT
Start: 2024-07-08

## 2024-07-01 RX ORDER — FAMOTIDINE 10 MG/ML
20 INJECTION, SOLUTION INTRAVENOUS AS NEEDED
Status: DISCONTINUED | OUTPATIENT
Start: 2024-07-01 | End: 2024-07-01 | Stop reason: HOSPADM

## 2024-07-01 RX ADMIN — SODIUM CHLORIDE 250 ML: 9 INJECTION, SOLUTION INTRAVENOUS at 10:29

## 2024-07-01 RX ADMIN — DIPHENHYDRAMINE HYDROCHLORIDE 25 MG: 50 INJECTION, SOLUTION INTRAMUSCULAR; INTRAVENOUS at 11:03

## 2024-07-01 RX ADMIN — FLUOROURACIL 1820 MG: 50 INJECTION, SOLUTION INTRAVENOUS at 11:25

## 2024-07-02 ENCOUNTER — HOSPITAL ENCOUNTER (OUTPATIENT)
Dept: RADIATION ONCOLOGY | Facility: HOSPITAL | Age: 89
Setting detail: RADIATION/ONCOLOGY SERIES
Discharge: HOME | End: 2024-07-02
Payer: MEDICARE

## 2024-07-02 LAB
RAD ONC ARIA COURSE ID: NORMAL
RAD ONC ARIA COURSE LAST TREATMENT DATE: NORMAL
RAD ONC ARIA COURSE START DATE: NORMAL
RAD ONC ARIA COURSE TREATMENT ELAPSED DAYS: 29
RAD ONC ARIA FIRST TREATMENT DATE: NORMAL
RAD ONC ARIA PLAN FRACTIONS TREATED TO DATE: 20
RAD ONC ARIA PLAN ID: NORMAL
RAD ONC ARIA PLAN PRESCRIBED DOSE PER FRACTION: 1.8 GY
RAD ONC ARIA PLAN PRIMARY REFERENCE POINT: NORMAL
RAD ONC ARIA PLAN TOTAL FRACTIONS PRESCRIBED: 25
RAD ONC ARIA PLAN TOTAL PRESCRIBED DOSE: 4500 CGY
RAD ONC ARIA REFERENCE POINT DOSAGE GIVEN TO DATE: 36 GY
RAD ONC ARIA REFERENCE POINT ID: NORMAL
RAD ONC ARIA REFERENCE POINT SESSION DOSAGE GIVEN: 1.8 GY

## 2024-07-02 PROCEDURE — 77412 RADIATION TX DELIVERY LVL 3: CPT | Performed by: RADIOLOGY

## 2024-07-03 ENCOUNTER — HOSPITAL ENCOUNTER (OUTPATIENT)
Dept: RADIATION ONCOLOGY | Facility: HOSPITAL | Age: 89
Setting detail: RADIATION/ONCOLOGY SERIES
Discharge: HOME | End: 2024-07-03
Payer: MEDICARE

## 2024-07-03 DIAGNOSIS — C20 RECTAL ADENOCARCINOMA: ICD-10-CM

## 2024-07-03 LAB
RAD ONC ARIA COURSE ID: NORMAL
RAD ONC ARIA COURSE LAST TREATMENT DATE: NORMAL
RAD ONC ARIA COURSE START DATE: NORMAL
RAD ONC ARIA COURSE TREATMENT ELAPSED DAYS: 30
RAD ONC ARIA FIRST TREATMENT DATE: NORMAL
RAD ONC ARIA PLAN FRACTIONS TREATED TO DATE: 21
RAD ONC ARIA PLAN ID: NORMAL
RAD ONC ARIA PLAN PRESCRIBED DOSE PER FRACTION: 1.8 GY
RAD ONC ARIA PLAN PRIMARY REFERENCE POINT: NORMAL
RAD ONC ARIA PLAN TOTAL FRACTIONS PRESCRIBED: 25
RAD ONC ARIA PLAN TOTAL PRESCRIBED DOSE: 4500 CGY
RAD ONC ARIA REFERENCE POINT DOSAGE GIVEN TO DATE: 37.8 GY
RAD ONC ARIA REFERENCE POINT ID: NORMAL
RAD ONC ARIA REFERENCE POINT SESSION DOSAGE GIVEN: 1.8 GY

## 2024-07-03 PROCEDURE — 77412 RADIATION TX DELIVERY LVL 3: CPT | Performed by: RADIOLOGY

## 2024-07-03 RX ORDER — DIPHENOXYLATE HYDROCHLORIDE AND ATROPINE SULFATE 2.5; .025 MG/1; MG/1
1 TABLET ORAL 4 TIMES DAILY PRN
Qty: 90 TABLET | Refills: 0 | Status: SHIPPED | OUTPATIENT
Start: 2024-07-03

## 2024-07-05 ENCOUNTER — HOSPITAL ENCOUNTER (OUTPATIENT)
Dept: RADIATION ONCOLOGY | Facility: HOSPITAL | Age: 89
Setting detail: RADIATION/ONCOLOGY SERIES
Discharge: HOME | End: 2024-07-05
Payer: MEDICARE

## 2024-07-05 ENCOUNTER — INFUSION (OUTPATIENT)
Dept: ONCOLOGY | Facility: HOSPITAL | Age: 89
End: 2024-07-05
Payer: MEDICARE

## 2024-07-05 VITALS
OXYGEN SATURATION: 96 % | HEART RATE: 81 BPM | RESPIRATION RATE: 16 BRPM | SYSTOLIC BLOOD PRESSURE: 146 MMHG | DIASTOLIC BLOOD PRESSURE: 54 MMHG | TEMPERATURE: 97.6 F

## 2024-07-05 DIAGNOSIS — Z45.2 ENCOUNTER FOR CARE RELATED TO VASCULAR ACCESS PORT: Primary | ICD-10-CM

## 2024-07-05 PROCEDURE — 25010000002 HEPARIN LOCK FLUSH PER 10 UNITS: Performed by: INTERNAL MEDICINE

## 2024-07-05 PROCEDURE — 77336 RADIATION PHYSICS CONSULT: CPT | Performed by: RADIOLOGY

## 2024-07-05 RX ORDER — SODIUM CHLORIDE 0.9 % (FLUSH) 0.9 %
10 SYRINGE (ML) INJECTION AS NEEDED
OUTPATIENT
Start: 2024-07-05

## 2024-07-05 RX ORDER — HEPARIN SODIUM (PORCINE) LOCK FLUSH IV SOLN 100 UNIT/ML 100 UNIT/ML
500 SOLUTION INTRAVENOUS AS NEEDED
Status: DISCONTINUED | OUTPATIENT
Start: 2024-07-05 | End: 2024-07-05 | Stop reason: HOSPADM

## 2024-07-05 RX ORDER — SODIUM CHLORIDE 0.9 % (FLUSH) 0.9 %
10 SYRINGE (ML) INJECTION AS NEEDED
Status: DISCONTINUED | OUTPATIENT
Start: 2024-07-05 | End: 2024-07-05 | Stop reason: HOSPADM

## 2024-07-05 RX ORDER — HEPARIN SODIUM (PORCINE) LOCK FLUSH IV SOLN 100 UNIT/ML 100 UNIT/ML
500 SOLUTION INTRAVENOUS AS NEEDED
OUTPATIENT
Start: 2024-07-05

## 2024-07-05 RX ADMIN — HEPARIN 500 UNITS: 100 SYRINGE at 09:25

## 2024-07-05 RX ADMIN — Medication 10 ML: at 09:25

## 2024-07-08 ENCOUNTER — LAB (OUTPATIENT)
Dept: LAB | Facility: HOSPITAL | Age: 89
End: 2024-07-08
Payer: MEDICARE

## 2024-07-08 ENCOUNTER — HOSPITAL ENCOUNTER (OUTPATIENT)
Dept: RADIATION ONCOLOGY | Facility: HOSPITAL | Age: 89
Setting detail: RADIATION/ONCOLOGY SERIES
Discharge: HOME | End: 2024-07-08
Payer: MEDICARE

## 2024-07-08 ENCOUNTER — INFUSION (OUTPATIENT)
Dept: ONCOLOGY | Facility: HOSPITAL | Age: 89
End: 2024-07-08
Payer: MEDICARE

## 2024-07-08 VITALS
HEIGHT: 70 IN | DIASTOLIC BLOOD PRESSURE: 47 MMHG | SYSTOLIC BLOOD PRESSURE: 144 MMHG | TEMPERATURE: 97.9 F | OXYGEN SATURATION: 94 % | HEART RATE: 74 BPM | WEIGHT: 179.4 LBS | RESPIRATION RATE: 20 BRPM | BODY MASS INDEX: 25.68 KG/M2

## 2024-07-08 DIAGNOSIS — C20 RECTAL ADENOCARCINOMA: ICD-10-CM

## 2024-07-08 DIAGNOSIS — C20 RECTAL ADENOCARCINOMA: Primary | ICD-10-CM

## 2024-07-08 LAB
ALBUMIN SERPL-MCNC: 3.5 G/DL (ref 3.5–5.2)
ALBUMIN/GLOB SERPL: 1.2 G/DL
ALP SERPL-CCNC: 87 U/L (ref 39–117)
ALT SERPL W P-5'-P-CCNC: 23 U/L (ref 1–41)
ANION GAP SERPL CALCULATED.3IONS-SCNC: 6 MMOL/L (ref 5–15)
AST SERPL-CCNC: 28 U/L (ref 1–40)
BASOPHILS # BLD AUTO: 0.02 10*3/MM3 (ref 0–0.2)
BASOPHILS NFR BLD AUTO: 0.3 % (ref 0–1.5)
BILIRUB SERPL-MCNC: 0.5 MG/DL (ref 0–1.2)
BUN SERPL-MCNC: 14 MG/DL (ref 8–23)
BUN/CREAT SERPL: 17.7 (ref 7–25)
CALCIUM SPEC-SCNC: 9 MG/DL (ref 8.2–9.6)
CHLORIDE SERPL-SCNC: 104 MMOL/L (ref 98–107)
CO2 SERPL-SCNC: 34 MMOL/L (ref 22–29)
CREAT SERPL-MCNC: 0.79 MG/DL (ref 0.76–1.27)
DEPRECATED RDW RBC AUTO: 52.7 FL (ref 37–54)
EGFRCR SERPLBLD CKD-EPI 2021: 83.9 ML/MIN/1.73
EOSINOPHIL # BLD AUTO: 0.19 10*3/MM3 (ref 0–0.4)
EOSINOPHIL NFR BLD AUTO: 3.1 % (ref 0.3–6.2)
ERYTHROCYTE [DISTWIDTH] IN BLOOD BY AUTOMATED COUNT: 16.1 % (ref 12.3–15.4)
GLOBULIN UR ELPH-MCNC: 3 GM/DL
GLUCOSE SERPL-MCNC: 119 MG/DL (ref 65–99)
HCT VFR BLD AUTO: 37.8 % (ref 37.5–51)
HGB BLD-MCNC: 12.8 G/DL (ref 13–17.7)
IMM GRANULOCYTES # BLD AUTO: 0.02 10*3/MM3 (ref 0–0.05)
IMM GRANULOCYTES NFR BLD AUTO: 0.3 % (ref 0–0.5)
LYMPHOCYTES # BLD AUTO: 0.61 10*3/MM3 (ref 0.7–3.1)
LYMPHOCYTES NFR BLD AUTO: 10 % (ref 19.6–45.3)
MCH RBC QN AUTO: 32.3 PG (ref 26.6–33)
MCHC RBC AUTO-ENTMCNC: 33.9 G/DL (ref 31.5–35.7)
MCV RBC AUTO: 95.5 FL (ref 79–97)
MONOCYTES # BLD AUTO: 0.7 10*3/MM3 (ref 0.1–0.9)
MONOCYTES NFR BLD AUTO: 11.5 % (ref 5–12)
NEUTROPHILS NFR BLD AUTO: 4.53 10*3/MM3 (ref 1.7–7)
NEUTROPHILS NFR BLD AUTO: 74.8 % (ref 42.7–76)
PLATELET # BLD AUTO: 169 10*3/MM3 (ref 140–450)
PMV BLD AUTO: 9.2 FL (ref 6–12)
POTASSIUM SERPL-SCNC: 4.6 MMOL/L (ref 3.5–5.2)
PROT SERPL-MCNC: 6.5 G/DL (ref 6–8.5)
RAD ONC ARIA COURSE ID: NORMAL
RAD ONC ARIA COURSE LAST TREATMENT DATE: NORMAL
RAD ONC ARIA COURSE START DATE: NORMAL
RAD ONC ARIA COURSE TREATMENT ELAPSED DAYS: 35
RAD ONC ARIA FIRST TREATMENT DATE: NORMAL
RAD ONC ARIA PLAN FRACTIONS TREATED TO DATE: 22
RAD ONC ARIA PLAN ID: NORMAL
RAD ONC ARIA PLAN PRESCRIBED DOSE PER FRACTION: 1.8 GY
RAD ONC ARIA PLAN PRIMARY REFERENCE POINT: NORMAL
RAD ONC ARIA PLAN TOTAL FRACTIONS PRESCRIBED: 25
RAD ONC ARIA PLAN TOTAL PRESCRIBED DOSE: 4500 CGY
RAD ONC ARIA REFERENCE POINT DOSAGE GIVEN TO DATE: 39.6 GY
RAD ONC ARIA REFERENCE POINT ID: NORMAL
RAD ONC ARIA REFERENCE POINT SESSION DOSAGE GIVEN: 1.8 GY
RBC # BLD AUTO: 3.96 10*6/MM3 (ref 4.14–5.8)
SODIUM SERPL-SCNC: 144 MMOL/L (ref 136–145)
WBC NRBC COR # BLD AUTO: 6.07 10*3/MM3 (ref 3.4–10.8)

## 2024-07-08 PROCEDURE — 77412 RADIATION TX DELIVERY LVL 3: CPT | Performed by: RADIOLOGY

## 2024-07-08 PROCEDURE — 85025 COMPLETE CBC W/AUTO DIFF WBC: CPT

## 2024-07-08 PROCEDURE — 96365 THER/PROPH/DIAG IV INF INIT: CPT

## 2024-07-08 PROCEDURE — 80053 COMPREHEN METABOLIC PANEL: CPT

## 2024-07-08 PROCEDURE — 77417 THER RADIOLOGY PORT IMAGE(S): CPT | Performed by: RADIOLOGY

## 2024-07-08 PROCEDURE — G0498 CHEMO EXTEND IV INFUS W/PUMP: HCPCS

## 2024-07-08 PROCEDURE — 25010000002 FLUOROURACIL PER 500 MG: Performed by: INTERNAL MEDICINE

## 2024-07-08 PROCEDURE — 25010000002 DIPHENHYDRAMINE PER 50 MG: Performed by: INTERNAL MEDICINE

## 2024-07-08 PROCEDURE — 36415 COLL VENOUS BLD VENIPUNCTURE: CPT

## 2024-07-08 RX ORDER — FAMOTIDINE 10 MG/ML
20 INJECTION, SOLUTION INTRAVENOUS AS NEEDED
Status: DISCONTINUED | OUTPATIENT
Start: 2024-07-08 | End: 2024-07-08 | Stop reason: HOSPADM

## 2024-07-08 RX ORDER — DIPHENHYDRAMINE HYDROCHLORIDE 50 MG/ML
50 INJECTION INTRAMUSCULAR; INTRAVENOUS AS NEEDED
Status: DISCONTINUED | OUTPATIENT
Start: 2024-07-08 | End: 2024-07-08 | Stop reason: HOSPADM

## 2024-07-08 RX ADMIN — FLUOROURACIL 1820 MG: 50 INJECTION, SOLUTION INTRAVENOUS at 11:45

## 2024-07-08 RX ADMIN — DIPHENHYDRAMINE HYDROCHLORIDE 25 MG: 50 INJECTION, SOLUTION INTRAMUSCULAR; INTRAVENOUS at 10:58

## 2024-07-09 ENCOUNTER — HOSPITAL ENCOUNTER (OUTPATIENT)
Dept: RADIATION ONCOLOGY | Facility: HOSPITAL | Age: 89
Setting detail: RADIATION/ONCOLOGY SERIES
Discharge: HOME | End: 2024-07-09
Payer: MEDICARE

## 2024-07-09 LAB
RAD ONC ARIA COURSE ID: NORMAL
RAD ONC ARIA COURSE LAST TREATMENT DATE: NORMAL
RAD ONC ARIA COURSE START DATE: NORMAL
RAD ONC ARIA COURSE TREATMENT ELAPSED DAYS: 36
RAD ONC ARIA FIRST TREATMENT DATE: NORMAL
RAD ONC ARIA PLAN FRACTIONS TREATED TO DATE: 23
RAD ONC ARIA PLAN ID: NORMAL
RAD ONC ARIA PLAN PRESCRIBED DOSE PER FRACTION: 1.8 GY
RAD ONC ARIA PLAN PRIMARY REFERENCE POINT: NORMAL
RAD ONC ARIA PLAN TOTAL FRACTIONS PRESCRIBED: 25
RAD ONC ARIA PLAN TOTAL PRESCRIBED DOSE: 4500 CGY
RAD ONC ARIA REFERENCE POINT DOSAGE GIVEN TO DATE: 41.4 GY
RAD ONC ARIA REFERENCE POINT ID: NORMAL
RAD ONC ARIA REFERENCE POINT SESSION DOSAGE GIVEN: 1.8 GY

## 2024-07-09 PROCEDURE — 77412 RADIATION TX DELIVERY LVL 3: CPT | Performed by: RADIOLOGY

## 2024-07-10 ENCOUNTER — HOSPITAL ENCOUNTER (OUTPATIENT)
Dept: RADIATION ONCOLOGY | Facility: HOSPITAL | Age: 89
Setting detail: RADIATION/ONCOLOGY SERIES
Discharge: HOME | End: 2024-07-10
Payer: MEDICARE

## 2024-07-10 LAB
RAD ONC ARIA COURSE ID: NORMAL
RAD ONC ARIA COURSE LAST TREATMENT DATE: NORMAL
RAD ONC ARIA COURSE START DATE: NORMAL
RAD ONC ARIA COURSE TREATMENT ELAPSED DAYS: 37
RAD ONC ARIA FIRST TREATMENT DATE: NORMAL
RAD ONC ARIA PLAN FRACTIONS TREATED TO DATE: 24
RAD ONC ARIA PLAN ID: NORMAL
RAD ONC ARIA PLAN PRESCRIBED DOSE PER FRACTION: 1.8 GY
RAD ONC ARIA PLAN PRIMARY REFERENCE POINT: NORMAL
RAD ONC ARIA PLAN TOTAL FRACTIONS PRESCRIBED: 25
RAD ONC ARIA PLAN TOTAL PRESCRIBED DOSE: 4500 CGY
RAD ONC ARIA REFERENCE POINT DOSAGE GIVEN TO DATE: 43.2 GY
RAD ONC ARIA REFERENCE POINT ID: NORMAL
RAD ONC ARIA REFERENCE POINT SESSION DOSAGE GIVEN: 1.8 GY

## 2024-07-10 PROCEDURE — 77412 RADIATION TX DELIVERY LVL 3: CPT | Performed by: RADIOLOGY

## 2024-07-11 ENCOUNTER — HOSPITAL ENCOUNTER (OUTPATIENT)
Dept: RADIATION ONCOLOGY | Facility: HOSPITAL | Age: 89
Setting detail: RADIATION/ONCOLOGY SERIES
Discharge: HOME | End: 2024-07-11
Payer: MEDICARE

## 2024-07-11 LAB
RAD ONC ARIA COURSE ID: NORMAL
RAD ONC ARIA COURSE LAST TREATMENT DATE: NORMAL
RAD ONC ARIA COURSE START DATE: NORMAL
RAD ONC ARIA COURSE TREATMENT ELAPSED DAYS: 38
RAD ONC ARIA FIRST TREATMENT DATE: NORMAL
RAD ONC ARIA PLAN FRACTIONS TREATED TO DATE: 25
RAD ONC ARIA PLAN ID: NORMAL
RAD ONC ARIA PLAN PRESCRIBED DOSE PER FRACTION: 1.8 GY
RAD ONC ARIA PLAN PRIMARY REFERENCE POINT: NORMAL
RAD ONC ARIA PLAN TOTAL FRACTIONS PRESCRIBED: 25
RAD ONC ARIA PLAN TOTAL PRESCRIBED DOSE: 4500 CGY
RAD ONC ARIA REFERENCE POINT DOSAGE GIVEN TO DATE: 45 GY
RAD ONC ARIA REFERENCE POINT ID: NORMAL
RAD ONC ARIA REFERENCE POINT SESSION DOSAGE GIVEN: 1.8 GY

## 2024-07-11 PROCEDURE — 77412 RADIATION TX DELIVERY LVL 3: CPT | Performed by: RADIOLOGY

## 2024-07-12 ENCOUNTER — INFUSION (OUTPATIENT)
Dept: ONCOLOGY | Facility: HOSPITAL | Age: 89
End: 2024-07-12
Payer: MEDICARE

## 2024-07-12 ENCOUNTER — TELEPHONE (OUTPATIENT)
Age: 89
End: 2024-07-12
Payer: MEDICARE

## 2024-07-12 ENCOUNTER — HOSPITAL ENCOUNTER (OUTPATIENT)
Dept: RADIATION ONCOLOGY | Facility: HOSPITAL | Age: 89
Discharge: HOME | End: 2024-07-12

## 2024-07-12 VITALS
HEART RATE: 81 BPM | TEMPERATURE: 97.8 F | OXYGEN SATURATION: 96 % | SYSTOLIC BLOOD PRESSURE: 149 MMHG | DIASTOLIC BLOOD PRESSURE: 48 MMHG | RESPIRATION RATE: 20 BRPM

## 2024-07-12 DIAGNOSIS — C20 RECTAL ADENOCARCINOMA: Primary | ICD-10-CM

## 2024-07-12 DIAGNOSIS — Z45.2 ENCOUNTER FOR CARE RELATED TO VASCULAR ACCESS PORT: ICD-10-CM

## 2024-07-12 LAB
RAD ONC ARIA COURSE ID: NORMAL
RAD ONC ARIA COURSE LAST TREATMENT DATE: NORMAL
RAD ONC ARIA COURSE START DATE: NORMAL
RAD ONC ARIA COURSE TREATMENT ELAPSED DAYS: 39
RAD ONC ARIA FIRST TREATMENT DATE: NORMAL
RAD ONC ARIA PLAN FRACTIONS TREATED TO DATE: 1
RAD ONC ARIA PLAN ID: NORMAL
RAD ONC ARIA PLAN PRESCRIBED DOSE PER FRACTION: 1.8 GY
RAD ONC ARIA PLAN PRIMARY REFERENCE POINT: NORMAL
RAD ONC ARIA PLAN TOTAL FRACTIONS PRESCRIBED: 3
RAD ONC ARIA PLAN TOTAL PRESCRIBED DOSE: 540 CGY
RAD ONC ARIA REFERENCE POINT DOSAGE GIVEN TO DATE: 46.8 GY
RAD ONC ARIA REFERENCE POINT ID: NORMAL
RAD ONC ARIA REFERENCE POINT SESSION DOSAGE GIVEN: 1.8 GY

## 2024-07-12 PROCEDURE — 25010000002 HEPARIN LOCK FLUSH PER 10 UNITS: Performed by: INTERNAL MEDICINE

## 2024-07-12 PROCEDURE — 77412 RADIATION TX DELIVERY LVL 3: CPT | Performed by: RADIOLOGY

## 2024-07-12 RX ORDER — HEPARIN SODIUM (PORCINE) LOCK FLUSH IV SOLN 100 UNIT/ML 100 UNIT/ML
500 SOLUTION INTRAVENOUS AS NEEDED
Status: DISCONTINUED | OUTPATIENT
Start: 2024-07-12 | End: 2024-07-12 | Stop reason: HOSPADM

## 2024-07-12 RX ORDER — SODIUM CHLORIDE 0.9 % (FLUSH) 0.9 %
10 SYRINGE (ML) INJECTION AS NEEDED
Status: DISCONTINUED | OUTPATIENT
Start: 2024-07-12 | End: 2024-07-12 | Stop reason: HOSPADM

## 2024-07-12 RX ORDER — SODIUM CHLORIDE 0.9 % (FLUSH) 0.9 %
10 SYRINGE (ML) INJECTION AS NEEDED
OUTPATIENT
Start: 2024-07-12

## 2024-07-12 RX ORDER — HEPARIN SODIUM (PORCINE) LOCK FLUSH IV SOLN 100 UNIT/ML 100 UNIT/ML
500 SOLUTION INTRAVENOUS AS NEEDED
OUTPATIENT
Start: 2024-07-12

## 2024-07-12 RX ADMIN — Medication 10 ML: at 09:24

## 2024-07-12 RX ADMIN — Medication 500 UNITS: at 09:24

## 2024-07-12 NOTE — TELEPHONE ENCOUNTER
Call placed to [phone] at [facility] to schedule Follow-up appt with Dr. Gali Kelly as a one month follow up after completion of XRT/chemo.    Appt scheduled for November 20, 2024 at 1 pm.  Stated she would add him to a list to move his appt up.    Call placed to [phone] (Number listed on Dr. Kelly's note) to check on this appt.  Spoke with Gus and she stated she will contact Dr. Kelly's office to move this appt as Dr. Kelly requested 1 mo follow up after XRT/chemo.

## 2024-07-15 ENCOUNTER — HOSPITAL ENCOUNTER (OUTPATIENT)
Dept: RADIATION ONCOLOGY | Facility: HOSPITAL | Age: 89
Setting detail: RADIATION/ONCOLOGY SERIES
Discharge: HOME | End: 2024-07-15
Payer: MEDICARE

## 2024-07-15 ENCOUNTER — INFUSION (OUTPATIENT)
Dept: ONCOLOGY | Facility: HOSPITAL | Age: 89
End: 2024-07-15
Payer: MEDICARE

## 2024-07-15 VITALS
SYSTOLIC BLOOD PRESSURE: 101 MMHG | WEIGHT: 178.2 LBS | HEART RATE: 81 BPM | DIASTOLIC BLOOD PRESSURE: 77 MMHG | OXYGEN SATURATION: 93 % | TEMPERATURE: 98.1 F | RESPIRATION RATE: 20 BRPM | BODY MASS INDEX: 25.51 KG/M2 | HEIGHT: 70 IN

## 2024-07-15 DIAGNOSIS — C20 RECTAL ADENOCARCINOMA: Primary | ICD-10-CM

## 2024-07-15 LAB
RAD ONC ARIA COURSE ID: NORMAL
RAD ONC ARIA COURSE LAST TREATMENT DATE: NORMAL
RAD ONC ARIA COURSE START DATE: NORMAL
RAD ONC ARIA COURSE TREATMENT ELAPSED DAYS: 42
RAD ONC ARIA FIRST TREATMENT DATE: NORMAL
RAD ONC ARIA PLAN FRACTIONS TREATED TO DATE: 2
RAD ONC ARIA PLAN ID: NORMAL
RAD ONC ARIA PLAN PRESCRIBED DOSE PER FRACTION: 1.8 GY
RAD ONC ARIA PLAN PRIMARY REFERENCE POINT: NORMAL
RAD ONC ARIA PLAN TOTAL FRACTIONS PRESCRIBED: 3
RAD ONC ARIA PLAN TOTAL PRESCRIBED DOSE: 540 CGY
RAD ONC ARIA REFERENCE POINT DOSAGE GIVEN TO DATE: 48.6 GY
RAD ONC ARIA REFERENCE POINT ID: NORMAL
RAD ONC ARIA REFERENCE POINT SESSION DOSAGE GIVEN: 1.8 GY

## 2024-07-15 PROCEDURE — 96365 THER/PROPH/DIAG IV INF INIT: CPT

## 2024-07-15 PROCEDURE — 77417 THER RADIOLOGY PORT IMAGE(S): CPT | Performed by: RADIOLOGY

## 2024-07-15 PROCEDURE — 25010000002 FLUOROURACIL PER 500 MG: Performed by: INTERNAL MEDICINE

## 2024-07-15 PROCEDURE — 25010000002 DIPHENHYDRAMINE PER 50 MG: Performed by: INTERNAL MEDICINE

## 2024-07-15 PROCEDURE — 77412 RADIATION TX DELIVERY LVL 3: CPT | Performed by: RADIOLOGY

## 2024-07-15 PROCEDURE — G0498 CHEMO EXTEND IV INFUS W/PUMP: HCPCS

## 2024-07-15 PROCEDURE — 96375 TX/PRO/DX INJ NEW DRUG ADDON: CPT

## 2024-07-15 RX ORDER — DIPHENHYDRAMINE HYDROCHLORIDE 50 MG/ML
50 INJECTION INTRAMUSCULAR; INTRAVENOUS AS NEEDED
Status: DISCONTINUED | OUTPATIENT
Start: 2024-07-15 | End: 2024-07-15 | Stop reason: HOSPADM

## 2024-07-15 RX ORDER — FAMOTIDINE 10 MG/ML
20 INJECTION, SOLUTION INTRAVENOUS AS NEEDED
Status: DISCONTINUED | OUTPATIENT
Start: 2024-07-15 | End: 2024-07-15 | Stop reason: HOSPADM

## 2024-07-15 RX ADMIN — FLUOROURACIL 1820 MG: 50 INJECTION, SOLUTION INTRAVENOUS at 11:10

## 2024-07-15 RX ADMIN — DIPHENHYDRAMINE HYDROCHLORIDE 25 MG: 50 INJECTION, SOLUTION INTRAMUSCULAR; INTRAVENOUS at 10:44

## 2024-07-16 ENCOUNTER — HOSPITAL ENCOUNTER (OUTPATIENT)
Dept: RADIATION ONCOLOGY | Facility: HOSPITAL | Age: 89
Setting detail: RADIATION/ONCOLOGY SERIES
Discharge: HOME | End: 2024-07-16
Payer: MEDICARE

## 2024-07-16 LAB
RAD ONC ARIA COURSE ID: NORMAL
RAD ONC ARIA COURSE LAST TREATMENT DATE: NORMAL
RAD ONC ARIA COURSE START DATE: NORMAL
RAD ONC ARIA COURSE TREATMENT ELAPSED DAYS: 43
RAD ONC ARIA FIRST TREATMENT DATE: NORMAL
RAD ONC ARIA PLAN FRACTIONS TREATED TO DATE: 3
RAD ONC ARIA PLAN ID: NORMAL
RAD ONC ARIA PLAN PRESCRIBED DOSE PER FRACTION: 1.8 GY
RAD ONC ARIA PLAN PRIMARY REFERENCE POINT: NORMAL
RAD ONC ARIA PLAN TOTAL FRACTIONS PRESCRIBED: 3
RAD ONC ARIA PLAN TOTAL PRESCRIBED DOSE: 540 CGY
RAD ONC ARIA REFERENCE POINT DOSAGE GIVEN TO DATE: 50.4 GY
RAD ONC ARIA REFERENCE POINT ID: NORMAL
RAD ONC ARIA REFERENCE POINT SESSION DOSAGE GIVEN: 1.8 GY

## 2024-07-16 PROCEDURE — 77412 RADIATION TX DELIVERY LVL 3: CPT | Performed by: RADIOLOGY

## 2024-07-16 PROCEDURE — 77336 RADIATION PHYSICS CONSULT: CPT | Performed by: RADIOLOGY

## 2024-07-19 PROCEDURE — 25010000002 HEPARIN LOCK FLUSH PER 10 UNITS: Performed by: INTERNAL MEDICINE

## 2024-07-19 RX ORDER — HEPARIN SODIUM (PORCINE) LOCK FLUSH IV SOLN 100 UNIT/ML 100 UNIT/ML
500 SOLUTION INTRAVENOUS AS NEEDED
Status: DISCONTINUED | OUTPATIENT
Start: 2024-07-19 | End: 2024-07-19 | Stop reason: HOSPADM

## 2024-07-19 RX ORDER — HEPARIN SODIUM (PORCINE) LOCK FLUSH IV SOLN 100 UNIT/ML 100 UNIT/ML
500 SOLUTION INTRAVENOUS AS NEEDED
OUTPATIENT
Start: 2024-07-19

## 2024-07-19 RX ORDER — SODIUM CHLORIDE 0.9 % (FLUSH) 0.9 %
10 SYRINGE (ML) INJECTION AS NEEDED
OUTPATIENT
Start: 2024-07-19

## 2024-07-19 RX ORDER — SODIUM CHLORIDE 0.9 % (FLUSH) 0.9 %
10 SYRINGE (ML) INJECTION AS NEEDED
Status: DISCONTINUED | OUTPATIENT
Start: 2024-07-19 | End: 2024-07-19 | Stop reason: HOSPADM

## 2024-07-19 RX ADMIN — Medication 10 ML: at 09:15

## 2024-07-19 RX ADMIN — HEPARIN 500 UNITS: 100 SYRINGE at 09:15

## 2024-07-20 ENCOUNTER — INFUSION (OUTPATIENT)
Dept: ONCOLOGY | Facility: HOSPITAL | Age: 89
End: 2024-07-20
Payer: MEDICARE

## 2024-07-20 DIAGNOSIS — Z45.2 ENCOUNTER FOR CARE RELATED TO VASCULAR ACCESS PORT: ICD-10-CM

## 2024-07-20 DIAGNOSIS — C20 RECTAL ADENOCARCINOMA: Primary | ICD-10-CM

## 2024-07-26 ENCOUNTER — TELEPHONE (OUTPATIENT)
Age: 89
End: 2024-07-26
Payer: MEDICARE

## 2024-07-26 NOTE — TELEPHONE ENCOUNTER
Mrs. Tellez called to inform us that Mr. Hernandez has an appt with Dr. Gali Kelly at [facility] for 08-.    I thanked her for informing of that appt date had been moved up.

## 2024-07-31 ENCOUNTER — TELEPHONE (OUTPATIENT)
Dept: ONCOLOGY | Facility: CLINIC | Age: 89
End: 2024-07-31

## 2024-07-31 NOTE — TELEPHONE ENCOUNTER
Caller: debbie capone    Relationship to patient: Emergency Contact    Best call back number: [phone]    Chief complaint: PATIENT TO CANCEL 8/7/24 APPTS UNTIL AFTER MEETING WITH MD ROBISON IN [place] ON 8/7/24

## 2024-10-18 ENCOUNTER — TELEPHONE (OUTPATIENT)
Age: 89
End: 2024-10-18
Payer: MEDICARE

## 2024-10-18 PROBLEM — Z92.3 HISTORY OF RADIATION THERAPY: Status: ACTIVE | Noted: 2024-10-18

## 2024-10-18 NOTE — TELEPHONE ENCOUNTER
"Patient's wife Ronel Tellez called.  Patient was seen on 10- by Dr. Kelly.    \"Dr. Kelly said he would not be a candidate for any more radiation and no surgery.  She advised us to follow with Dr. Abraham.\"    I would like to cancel his appt for 10-.  Appt cancelled.  "

## 2024-11-08 ENCOUNTER — TELEPHONE (OUTPATIENT)
Dept: ONCOLOGY | Facility: CLINIC | Age: 89
End: 2024-11-08

## 2024-11-08 NOTE — TELEPHONE ENCOUNTER
Caller: debbie capone    Relationship to patient: Emergency Contact    Best call back number: [phone]    Chief complaint: SCHEDULING    Type of visit: FOLLOW UP    Requested date: NEXT AVLIABLE AFTER 11-18     If rescheduling, when is the original appointment: 11-18

## 2024-11-10 NOTE — PROGRESS NOTES
"[facility] ONC [facility] HEMATOLOGY & ONCOLOGY  [address]  [place] [address] [phone]    Patient Name: Juliocesar Tellez  Encounter Date: 11/19/2024  YOB: 1932  Patient Number: [id number]      REASON FOR VISIT: Juliocesar Tellez is a 89 yoM who returns in follow-up of stage III rectal adenocarcinoma.  He has received neoadjuvant FOLFOX- C1, 12/12/23; C2, 1/3/24; C3, 1/24/24; C4, 2/28/24; C5, 3/25/24-C6, 4/22/24. He has been seen in follow-up by Dr. Kelly of  surgery, 5/1/24 (below).  He has received RT (6/3/24-7/16/24 -5040 cGy/28 fx) with concurrent CIV 5-FU beginning 6/3/24; 6/10/24; 6/17/24; 6/24/24; 7/1/24; 7/8/24; 7/15/24.  He is here with his spouse, Cristina (previously with his son, Vivek)..      I have reviewed the HPI and verified with the patient the accuracy of it. No changes to interval history since the information was documented. Damian Abraham MD 11/19/24      Diagnostic abnormalities:  --medical history includes diabetes, SARAHI, arthritis, BPH, hyperlipidemia, rectal bleeding and recently diagnosed rectal adenocarcinoma.  --9/18/23- Hgb 12.9 (pcp office)  --10/10/23-referred by Dr. Gerardo Prescott to GI for bright red rectal bleeding x 1 month.  No associated rectal pain, no weight loss no abdominal pain, no lightheadedness, dizziness nor black stool.  --11/9/23- Colonoscopy:  Rectal mass.  Malignant partially obstructing tumor from 4 to 11 cm proximal to the anus.  Biopsied.  Exam otherwise normal on direct and retroflexion views.  Final diagnosis:Large intestine, designated \"rectal mass\".  Invasive adenocarcinoma, well to moderately differentiated.  MSI by PCR: MSI-stable (LORENZO)  --11/20/23- Today the patient is seen for management considerations.  Sodium is 146 otherwise normal CMP, ferritin 24 (L), iron 46 (L), iron saturation 13% (L), B12 380, folate 19.7, CEA 15 (H), Hgb 12.1, MCV 93.3, platelets 196,000, WBC " 8.67.  --12/1/23-CT chest-No definite evidence of metastatic disease in the chest. There is a 7 mm nodular density along the right minor fissure which is likely an intrafissural lymph node or small area of atelectasis/scarring. However, recommend special attention on follow-up imaging to exclude a pulmonary nodule. Advanced pulmonary fibrosis, UIP pattern.  Mild bilateral hilar lymphadenopathy, likely reactive.   --12/1/23- CT abdomen/pelvis-  Thickening of the wall of the rectum consistent with the history of  rectal neoplasm. The outer wall of the rectum is somewhat ill-defined. Therefore, there may be some extension through the wall. In addition, the fat plane between the rectum and prostate is obscured suggesting possible extension of tumor into this area. There is a 5 mm lymph node in the left perirectal fat image 71 series 3. There is an additional small lymph node more superiorly in the pelvic mesenteric fat measuring 6-7 mm in short axis diameter. No other evidence of metastatic disease in the abdomen or pelvis. Bilateral inguinal hernias. The hernia on the right contains a portion of the bladder wall and peritoneal fat. The hernia on the left contains peritoneal fat. There is thickening of the bladder wall. The bladder is under distended. Wall thickening may be due to muscular hypertrophy as there is enlargement of the prostate. Artifact of under distention, infection  and inflammation are considered. Gallstones in the gallbladder.  --12/5/23- flexible sigmoidoscopy with rectal EUS, Dr. Calderon Cano  Known rectal mass was 80% circumferential and invasive appearing with ulceration extending from 4 cm to 14 cm  Endosonography revealed an irregular appearing heterogeneous mass measuring over 4.4 cm in greatest dimension with evidence of invasion through and obliteration of the muscularis propria.  Irregular borders were noted with questionable spread through the rectal wall.  The lesion did not appear to invade  the prostate.  No definite lymphadenopathy was appreciated.      Previous interventions:  -- Venofer  mg- 12/4/23; 12/11/23; 12/18/23; 12/27/23  -- Neoadjuvant FOLFOX- C1, 12/12/23; C2, 1/3/24; C3, 1/24/24; C4, 2/28/24;C5, 3/25/24; C6, 4/22/24.  -- CIV 5-FU beginning 6/3/24; 6/10/24; 6/17/24; 6/24/24; 7/1/24; 7/8/24; 7/15/24  -- Concurrent RT beginning 6/3/24- 7/16/24 (5040 cGy//28 fx)  --10/16/24- Follow-up Dr. Kelly-  -PROCEDURE:External examination was undertaken. RUPERTO was then performed. The scope was carefully inserted into the rectum and advanced under direct visualization to the level documented above. Careful circumferential examination of the colon and rectum was then undertaken. The scope was removed. There were no complication at the completion of the scope. FINDINGS: significant decrease in the size of the tumor, now looks like an ulcerated bed.   -ASSESSMENT AND PLAN:  Mr Tellez is a 89 yo male diagnosed with a rectal cancer. He was treated with ROSE and returns two weeks after completion for evaluation. His tumor mass is gone and there is currently an ulcerated bed in its place. He has not had any repeat imaging at this time. We discussed his wishes with regard to surgery and surveillance. If he decided that he does not want any surgery then it may not be necessary to do any further testing. If on the other hand, he is considering it, it makes sense to restage him. The other option might be to just rescope in a few months to see if the ulceration has healed and see what is left. He would like to think about his options and will get back to me regarding his decision. If he would like to consider further treatment, I would recommend a repeat MRI of his pelvis with repeat scope in about 2 months.   PLAN  Pt is deciding if he is interested in any further treatment  If yes, we will plan for MRI pelvis and repeat flex sig in about 2 months.   If no, would not recommend any further testing.       PAST  MEDICAL HISTORY:  ALLERGIES:  Allergies   Allergen Reactions    Sulfa Antibiotics Other (See Comments)     Flu like symptoms     CURRENT MEDICATIONS:  Outpatient Encounter Medications as of 11/19/2024   Medication Sig Dispense Refill    acetaminophen (Tylenol) 325 MG tablet Take 3 tablets by mouth Every 8 (Eight) Hours. Take every 8 hours for 3 days then take prn as needed.      aspirin 81 MG EC tablet Take 1 tablet by mouth Daily.      atorvastatin (LIPITOR) 40 MG tablet Take 1 tablet by mouth Daily.      azelastine (ASTELIN) 0.1 % nasal spray Administer 2 sprays into the nostril(s) as directed by provider 2 (Two) Times a Day. Use in each nostril as directed      cetirizine (zyrTEC) 10 MG tablet Take 1 tablet by mouth Daily.      dicyclomine (BENTYL) 10 MG capsule Take 1 capsule by mouth 4 (Four) Times a Day Before Meals & at Bedtime.      diphenoxylate-atropine (LOMOTIL) 2.5-0.025 MG per tablet Take 1 tablet by mouth 4 (Four) Times a Day As Needed for Diarrhea. 90 tablet 0    HumaLOG Mix 75/25 KwikPen (75-25) 100 UNIT/ML suspension pen-injector pen       insulin lispro protamine-insulin lispro (humaLOG 75-25) (75-25) 100 UNIT/ML suspension injection Inject  under the skin into the appropriate area as directed Take As Directed. 60 MORNING 35 EVENING      ketoconazole (NIZORAL) 2 % cream Apply 1 Application topically to the appropriate area as directed Daily.      ketoconazole (NIZORAL) 2 % cream Apply 1 Application topically to the appropriate area as directed Daily.      lidocaine-prilocaine (EMLA) 2.5-2.5 % cream 30 minutes prior to access apply generous amount of Emla Cream to port site and cover with clear plastic wrap until ready for use 1 each 2    lisinopril (PRINIVIL,ZESTRIL) 2.5 MG tablet Take 1 tablet by mouth Daily.      magnesium oxide (MAGOX) 400 (241.3 Mg) MG tablet tablet Take 1 tablet by mouth Daily.      Menthol-Zinc Oxide (Calmoseptine) 0.44-20.6 % ointment Apply 1 Application topically to the  "appropriate area as directed 2 (Two) Times a Day.      Olopatadine HCl (PATADAY OP) Apply  to eye(s) as directed by provider.      ondansetron (Zofran) 8 MG tablet Take 1 tablet by mouth Every 8 (Eight) Hours As Needed for Nausea or Vomiting. 30 tablet 2    prochlorperazine (COMPAZINE) 10 MG tablet Take 1 tablet by mouth Every 6 (Six) Hours As Needed for Nausea or Vomiting. 60 tablet 1    tamsulosin (FLOMAX) 0.4 MG capsule 24 hr capsule TAKE ONE CAPSULE DAILY      timolol (BLOCADREN) 5 MG tablet Take 1 tablet by mouth 2 (Two) Times a Day.      TRAVOPROST OP Apply  to eye(s) as directed by provider.       No facility-administered encounter medications on file as of 11/19/2024.     Adult illnesses:  Arthritis  BPH  Diabetes  Hemorrhoids  Hyperlipidemia  SARAHI  Primary open-angle glaucoma  Pseudophakia  Rectal bleeding  Rectal carcinoma    Past surgeries:  Tonsillectomy  Colonoscopy with 8 polyps, tubular adenoma, few diverticula, 12/31/2013  Colonoscopy, 11/9/23-rectal mass.  Malignant partially obstructing tumor from 4 to 11 cm proximal to the anus.  Biopsied.  Exam otherwise normal on direct and retroflexion views.  Final diagnosis:Large intestine, designated \"rectal mass\": -Invasive adenocarcinoma, well to moderately differentiated  Port-A-Cath placement in the right cephalic vein, 11/29/23-Dr. Ortiz    ADULT ILLNESSES:  Patient Active Problem List   Diagnosis Code    Chest pain, atypical R07.89    Diabetes mellitus E11.9    Elevated blood pressure reading without diagnosis of hypertension R03.0    BRBPR (bright red blood per rectum) K62.5    Rectal adenocarcinoma C20    Iron deficiency anemia D50.9    Encounter for care related to vascular access port Z45.2    Non-smoker Z78.9    Rectal cancer C20    History of radiation therapy Z92.3     SURGERIES:  Past Surgical History:   Procedure Laterality Date    COLONOSCOPY  12/31/2013    8 polyps, tubular adenoma, a few divertucula    COLONOSCOPY N/A 11/09/2023    " "Procedure: SIGMOIDOSCOPY FLEXIBLE;  Surgeon: Terry Malik MD;  Location: [facility] ENDOSCOPY;  Service: Gastroenterology;  Laterality: N/A;  preop; BRBPR  postop rectal mass  PCP Gerardo Prescott    TONSILLECTOMY      VENOUS ACCESS DEVICE (PORT) INSERTION N/A 11/29/2023    Procedure: Single Lumen Port-a-cath insertion with flouroscopy;  Surgeon: Marquita Ortiz MD;  Location: [facility] OR;  Service: General;  Laterality: N/A;     HEALTH MAINTENANCE ITEMS:  Health Maintenance Due   Topic Date Due    COVID-19 Vaccine (1) Never done    Pneumococcal Vaccine 65+ (1 of 2 - PCV) Never done    DIABETIC FOOT EXAM  Never done    DIABETIC EYE EXAM  Never done    RSV Vaccine - Adults (1 - 1-dose 75+ series) Never done    ZOSTER VACCINE (1 of 2) 03/31/2014    LIPID PANEL  01/22/2019    ANNUAL WELLNESS VISIT  Never done    HEMOGLOBIN A1C  07/15/2020    TDAP/TD VACCINES (2 - Tdap) 02/21/2022    INFLUENZA VACCINE  08/01/2024       <no information>  Last Completed Colonoscopy       This patient has no relevant Health Maintenance data.            There is no immunization history on file for this patient.  Last Completed Mammogram       This patient has no relevant Health Maintenance data.              FAMILY HISTORY:  Family History   Problem Relation Age of Onset    No Known Problems Mother     No Known Problems Father     Liver cancer Sister     Colon cancer Neg Hx      SOCIAL HISTORY:  Social History     Socioeconomic History    Marital status:    Tobacco Use    Smoking status: Never    Smokeless tobacco: Never   Vaping Use    Vaping status: Never Used   Substance and Sexual Activity    Alcohol use: Never    Drug use: Never    Sexual activity: Defer       REVIEW OF SYSTEMS:  Review of Systems   Constitutional:  Positive for appetite change (\"I eat well but I don't get hungry\"). Negative for activity change (\"about the same'), fatigue and unexpected weight loss.        Manages his ADLs, to include chores, \"some\" but not " "running errands and no longer drives. Is up and about, \"about 50%.\" Ambulates with a cane and walker at home    Chemo tolerance:  Fatigue.  \"No other ill effects.\" No cold associated neuropathy of the hands, but denies loss of fine motor function.  \"Not more than usual.\" No diarrhea.  No mouth sores, no nausea, no vomiting, no skin changes.   HENT: Negative.     Eyes: Negative.    Respiratory:  Negative for shortness of breath (Baseline exertional dyspnea but no SOB with routine activities).         SARAHI NOT on CPAP   Cardiovascular: Negative.    Gastrointestinal:  Negative for abdominal distention, abdominal pain, anal bleeding, blood in stool (with BMs. \"not like it was before\"), constipation, diarrhea (Loose stools particularly at night ~ 2-4x/day.  Uses Imodium ~ 4x/day.  Uses liners), nausea, rectal pain and vomiting.   Endocrine: Negative.    Genitourinary: Negative.    Musculoskeletal:  Positive for arthralgias (Chronic pains in hands, right ankle (prior injury)neck and lower back), back pain (Chronic), myalgias (with ambulation, \"50 feet\") and neck stiffness (Chronic \"stiffness\"). Negative for neck pain.   Skin: Negative.    Neurological: Negative.    Hematological: Negative.    Psychiatric/Behavioral: Negative.         /68   Pulse 78   Temp 98.9 °F (37.2 °C) (Tympanic)   Resp 18   Ht 177.8 cm (70\")   Wt 82.6 kg (182 lb)   SpO2 98%   BMI 26.11 kg/m²  Body surface area is 2.01 meters squared.  Pain Score    11/19/24 1336   PainSc: 0-No pain       Physical Exam  Vitals and nursing note reviewed.   Constitutional:       Comments: Pleasant, cooperative, heavyset middle-aged elderly male.  Brought in by wheelchair.  ECOG 2 (from 1-2).      Has regained 5 lb (had lost 7 lb at his prior visit) since the last visit   HENT:      Head: Normocephalic and atraumatic.   Eyes:      General: No scleral icterus.     Extraocular Movements: Extraocular movements intact.      Pupils: Pupils are equal, round, and " "reactive to light.   Cardiovascular:      Rate and Rhythm: Normal rate.   Pulmonary:      Effort: Pulmonary effort is normal.      Comments: Port in the right upper chest is well seated  Abdominal:      Palpations: Abdomen is soft. There is no mass (RUQ fullness/hepatomegaly?).      Tenderness: There is no abdominal tenderness.   Musculoskeletal:         General: Swelling present. Normal range of motion.      Cervical back: Normal range of motion.      Right lower leg: Edema (1+ (chronic)- wearing an ankle brace for prior ankle injury) present.      Left lower leg: Edema (1+ (chronic)) present.   Lymphadenopathy:      Cervical: No cervical adenopathy.   Skin:     General: Skin is warm.   Neurological:      General: No focal deficit present.      Mental Status: He is alert and oriented to person, place, and time.   Psychiatric:         Mood and Affect: Mood normal.         Behavior: Behavior normal.         Thought Content: Thought content normal.           Assessment:   Rectal adenocarcinoma  --Original tumor stage:  TNM III (cT3, cN1, M0). MSI by PCR: MSI-stable (LORENZO)  --Original tumor burden:  --11/9/23- Colonoscopy:  Rectal mass.  Malignant partially obstructing tumor from 4 to 11 cm proximal to the anus.  Biopsied.  Exam otherwise normal on direct and retroflexion views.  Final diagnosis:Large intestine, designated \"rectal mass\".  Invasive adenocarcinoma, well to moderately differentiated.  --12/1/23- CT CAP- No mets in chest.  Thickening of the wall of the rectum consistent with the history of rectal neoplasm. The outer wall of the rectum is somewhat ill-defined. Therefore, there may be some extension through the wall. In addition, the fat plane between the rectum and prostate is obscured suggesting possible extension of tumor into this area. There is a 5 mm lymph node in the left perirectal fat image 71 series 3. There is an additional small lymph node more superiorly in the pelvic mesenteric fat measuring 6-7 " mm in short axis diameter. No other evidence of mets.  --12/5/23- flexible sigmoidoscopy with rectal EUS, Dr. Calderon Cano  Known rectal mass was 80% circumferential and invasive appearing with ulceration extending from 4 cm to 14 cm  Endosonography revealed an irregular appearing heterogeneous mass measuring over 4.4 cm in greatest dimension with evidence of invasion through and obliteration of the muscularis propria.  Irregular borders were noted with questionable spread through the rectal wall.  The lesion did not appear to invade the prostate.  No definite lymphadenopathy was appreciated.    --Complications of tumor:  --Rectal bleeding, anemia  --Tumor status:  -- Neoadjuvant FOLFOX - completed C6, 4/22/24  -  2/7/24- Consult CRC, Dr. Way/P: Mr Tellez is a 89 yo male being treated with chemotherapy for a T3N0M0 rectal cancer. I am not able to scope him today since he is in the middle of his chemotherapy. He was scheduled to come see me however cancelled the appt thinking it was not necessary at that time. I have explained to him the importance of me examining him prior to completion of his treatment so as to better plan if surgery will be necessary and if it is what surgery would be needed. I will have him come back after he has completed his chemotherapy but before he starts his radiation treatment. He has voiced understanding of the importance of this and will be sure to come to his next appt.   PLAN  Contact Dr Abraham to see when is will chemo be completed  Plan for rtc after completion of chemo but before starting xrt for flex sig  Will see back then again after he has completed his xrt.   - 5/1/24- Follow-up Dr. Way/P: Mr Tellez is a 89 yo male who has completed his chemotherapy for treatment of his rectal cancer and is here today for evaluation. I have not seen the tumor as he missed his appt that was made for him prior to starting treatment. When I saw him last he was in the middle of chemo  so not advisable to do a scope at that time. He is now between his chemo and xrt treatments. Exam today showed a large mass, not obstructing, extending from the left posterior quadrant , around the anterior midline over to the right anterior quadrant. He will start his chemo /xrt soon. At this time, the lesion is not involving his sphincters however, it would probably be in his best interest to do an APR should a surgery be considered. If continuity was restored he would require an DLI and that would then require a second operation in a 89 yo patient which would not be advisable. If he has a very good response, possibly no surgery would be needed.   Plan  Patient to return one month after completion of his chemo/xrt.   - 5/6/24- Follow-up Dr. Tim Tellez returns to the clinic today to begin radiation therapy for Stage III (cT3 cN1 M0), well to moderately differentiated rectal adenocarcinoma. This time I recommend a course of pelvic radiation along with low-dose chemotherapy with continuous infusion 5-FU.  I anticipate a dose of 5040 cGy in 28 treatment fractions to the pelvis along with 5-FU chemotherapy  -- CIV 5-FU beginning 6/3/24; 6/10/24; 6/17/24; 6/24/24  -- Concurrent RT beginning 6/3/24- 7/16/24 (28/fx)  -- 10/16/96-Tadtgw-fv Dr. Kelly-  -PROCEDURE:External examination was undertaken. RUPERTO was then performed. The scope was carefully inserted into the rectum and advanced under direct visualization to the level documented above. Careful circumferential examination of the colon and rectum was then undertaken. The scope was removed. There were no complication at the completion of the scope. FINDINGS: significant decrease in the size of the tumor, now looks like an ulcerated bed.   -ASSESSMENT AND PLAN:  Mr Tellez is a 89 yo male diagnosed with a rectal cancer. He was treated with ROSE and returns two weeks after completion for evaluation. His tumor mass is gone and there is currently an ulcerated bed in  its place. He has not had any repeat imaging at this time. We discussed his wishes with regard to surgery and surveillance. If he decided that he does not want any surgery then it may not be necessary to do any further testing. If on the other hand, he is considering it, it makes sense to restage him. The other option might be to just rescope in a few months to see if the ulceration has healed and see what is left. He would like to think about his options and will get back to me regarding his decision. If he would like to consider further treatment, I would recommend a repeat MRI of his pelvis with repeat scope in about 2 months.   PLAN  Pt is deciding if he is interested in any further treatment  If yes, we will plan for MRI pelvis and repeat flex sig in about 2 months.   If no, would not recommend any further testing.     2.   Anemia. Iron deficiency from rectal bleeding/malignancy/chemo/RT  --11/20/23- fe 46 (L), fe sat 13% (L), ferritin 24 (L)  --Venofer x 4-12/4-12/27/23  --Hgb 13.1; MCV 95.1, 6/24/24 (prior linn:  Hgb 11.4)  3.   Thrombocytopenia. Chemo+RT.   4.   Diabetes on insulin  5.   Arthritis  6.   BPH  7.   SARAHI  8.   Advanced age  9.   Borderline PS (ECOG 1-2)    Plan:  Apprised of labs, 6/17/24 -6/24/24 with stable anemia and platelets 126,000 otherwise normal CBC, glucose 128, aphos 102 (prior: ) otherwise normal CMP  Note MMR could not be done due to lack of material.  While MSI by PCR: MSI-stable (LORENZO)  Chemo tolerance.  Grade 1 -2 diarrhea/fatigue- has received diarrhea teaching sheets.   Review NCCN guidelines 2.2023 rectal cancer-for clinical stage T4, N any - pMMR/LORENZO- primary treatment - total neoadjuvant therapy.  FOLFOX or [place] ox- 12-16 weeks)-long course chemo/RT with capecitabine or infusional 1-YF-iejzzxktr (best tumor response 8 weeks after completion of RT)- reassess for surgery (Transabdominal resection or of CR consider observation.  If resection contraindciated-systemic  "therapy; If dMMR/MSI-H- primary treatment- NEOADJUVANT/DEFINITIVE IMMUNOTHERAPY (PREFERRED)- IO therapy for up to 6 months (nivolumab or pembrolizumab); OR Total neoadjuvant therapy (as above)  Follow-up Dr. Kelly, 10/16/24-  -PROCEDURE:External examination was undertaken. RUPERTO was then performed. The scope was carefully inserted into the rectum and advanced under direct visualization to the level documented above. Careful circumferential examination of the colon and rectum was then undertaken. The scope was removed. There were no complication at the completion of the scope. FINDINGS: significant decrease in the size of the tumor, now looks like an ulcerated bed (biopsy: Nondiagnostic for malignancy).   -ASSESSMENT AND PLAN:  Mr Tellez is a 89 yo male diagnosed with a rectal cancer. He was treated with ROSE and returns two weeks after completion for evaluation. His tumor mass is gone and there is currently an ulcerated bed in its place. He has not had any repeat imaging at this time. We discussed his wishes with regard to surgery and surveillance. If he decided that he does not want any surgery then it may not be necessary to do any further testing. If on the other hand, he is considering it, it makes sense to restage him. The other option might be to just rescope in a few months to see if the ulceration has healed and see what is left. He would like to think about his options and will get back to me regarding his decision. If he would like to consider further treatment, I would recommend a repeat MRI of his pelvis with repeat scope in about 2 months.   PLAN  Pt is deciding if he is interested in any further treatment  If yes, we will plan for MRI pelvis and repeat flex sig in about 2 months.   If no, would not recommend any further testing.     6.  Discussed most recent office visit with Dr. Kelly, 10/16/2024 (above).  The patient states he has decided against surgery.  \"Given my age and my diabetes it will be " "too much for me.\"  Thus discussed the option of a repeat imaging down the road and taking it from there.  Agrees.  Will order an MRI of the abdomen and pelvis.  They reserve the option to cancel the study and request palliative care/hospice, \"when the time comes\"  7.   Schedule MRI abdomen/pelvis w and wo contrast in 7 weeks  8.  Return to office in 8 weeks with preoffice CBC, diff and CMP    I spent ~43 minutes caring for Juliocesar on this date of service. This time includes time spent by me in the following activities: preparing for the visit, reviewing tests, performing a medically appropriate examination and/or evaluation, counseling and educating the patient/family/caregiver, ordering medications, tests, or procedures and documenting information in the medical record.       "

## 2024-11-19 ENCOUNTER — OFFICE VISIT (OUTPATIENT)
Dept: ONCOLOGY | Facility: CLINIC | Age: 89
End: 2024-11-19
Payer: MEDICARE

## 2024-11-19 VITALS
WEIGHT: 182 LBS | BODY MASS INDEX: 26.05 KG/M2 | OXYGEN SATURATION: 98 % | HEIGHT: 70 IN | RESPIRATION RATE: 18 BRPM | SYSTOLIC BLOOD PRESSURE: 134 MMHG | DIASTOLIC BLOOD PRESSURE: 68 MMHG | HEART RATE: 78 BPM | TEMPERATURE: 98.9 F

## 2024-11-19 DIAGNOSIS — C20 RECTAL CANCER: Primary | ICD-10-CM

## 2024-11-20 ENCOUNTER — TELEPHONE (OUTPATIENT)
Dept: ONCOLOGY | Facility: CLINIC | Age: 89
End: 2024-11-20
Payer: MEDICARE

## 2024-11-20 ENCOUNTER — TELEPHONE (OUTPATIENT)
Dept: ONCOLOGY | Facility: CLINIC | Age: 89
End: 2024-11-20

## 2024-11-20 NOTE — TELEPHONE ENCOUNTER
Caller: Guero Brandt (ON  [place])    Relationship: Emergency Contact    Best call back number: [phone]    What was the call regarding: WOULD LIKE FOR YOU TO CALL HIM TO DISCUSS THE LAST OFFICE VISIT ON 11/19/2024.  WOULD LIKE TO CLARIFY THAT NOTHING ELSE CAN BE DONE BUT KEEP HIM COMFORTABLE.

## 2024-11-20 NOTE — TELEPHONE ENCOUNTER
Caller: debbie capone    Relationship: Emergency Contact    Best call back number: [phone]    What was the call regarding: PT WOULD LIKE PROG NOTES FROM 11/19 FAXED TO DR MATT MOTLEY [phone] AHEAD OF HIS 12/5 APPT

## 2024-11-21 NOTE — TELEPHONE ENCOUNTER
Spoke with Guero and let him know that the patient is scheduled for follow up in January. Pt declined surgery but we have ordered a MRI to be done in 7 weeks. It is okay if patient decides that he does not want this. We can cancel it at any time.

## 2024-11-26 ENCOUNTER — TELEPHONE (OUTPATIENT)
Age: 88
End: 2024-11-26

## 2024-11-26 NOTE — TELEPHONE ENCOUNTER
Vin Streeter [birth year]  MRN:[id number]   [facility] er 11/26  Possible fx Left foot   DOI 11/23  Flat sole shoe  XRAYS   [employer] medicare/[employer] advantage hmo  Wife Ramona [phone]

## 2024-11-27 NOTE — TELEPHONE ENCOUNTER
This appointment wont work for them because they have another appointment they have to be at this day

## 2024-12-03 ENCOUNTER — OFFICE VISIT (OUTPATIENT)
Age: 88
End: 2024-12-03
Payer: MEDICARE

## 2024-12-03 VITALS — WEIGHT: 180 LBS | BODY MASS INDEX: 25.77 KG/M2 | HEIGHT: 70 IN
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
## 2024-12-03 DIAGNOSIS — M79.671 RIGHT FOOT PAIN: Primary | ICD-10-CM
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
## 2024-12-03 DIAGNOSIS — S92.354A CLOSED NONDISPLACED FRACTURE OF FIFTH METATARSAL BONE OF RIGHT FOOT, INITIAL ENCOUNTER: ICD-10-CM

## 2024-12-03 PROCEDURE — 99204 OFFICE O/P NEW MOD 45 MIN: CPT | Performed by: NURSE PRACTITIONER

## 2024-12-03 PROCEDURE — 1159F MED LIST DOCD IN RCRD: CPT | Performed by: NURSE PRACTITIONER

## 2024-12-03 PROCEDURE — 1123F ACP DISCUSS/DSCN MKR DOCD: CPT | Performed by: NURSE PRACTITIONER

## 2024-12-03 ASSESSMENT — ENCOUNTER SYMPTOMS
COUGH: 0
CONSTIPATION: 0
DIARRHEA: 0
VOMITING: 0
COLOR CHANGE: 0
SHORTNESS OF BREATH: 0
BLOOD IN STOOL: 0
NAUSEA: 0

## 2024-12-03 NOTE — PROGRESS NOTES
Chief Complaint    Chief Complaint   Patient presents with    Foot Injury     Left foot        Body Part: Foot left    When did the symptoms begin/Date of Onset? 2 weeks ago    Where did the injury happen? Home    How did the injury happen? Had a fall    If over 55, have you barker an Osteoporosis Screening in the last 2 years? NA    Injury Details    Previous similar problems or complaints? No    Severity of Pain: 5    Character of Pain: Other: \"Medium\"    What makes your symptoms worse? Standing    How long does the pain last? Intermittent    Associated Symptoms: Swelling    What makes your symptoms better? Rest and Tylenol, topicals    Previous Treatment for the Problem: Medications    Any special diagnostic tests or studies done? X-Rays; Where? [facility]    Similar complaints on opposite side? No    Review of Systems    Review of Systems   Constitutional:  Negative for appetite change, chills, fatigue and fever.   Respiratory:  Negative for cough and shortness of breath.    Cardiovascular:  Negative for chest pain, palpitations and leg swelling.   Gastrointestinal:  Negative for blood in stool, constipation, diarrhea, nausea and vomiting.   Musculoskeletal:  Negative for arthralgias, gait problem and joint swelling.   Skin:  Negative for color change.   Neurological:  Negative for weakness.

## 2024-12-03 NOTE — PROGRESS NOTES
[facility]  [facility]  [address]  [facility] [id number] [phone]     Patient: Vin Streeter   YOB: 1932   Date: 12/3/2024   Visit Type:      History of Present Illness  Chief Complaint   Patient presents with    Foot Injury     Left foot       This is a 89 y.o. male presents today complaining of left foot pain.  He relates a fall about 3 weeks ago.  Did present at the emergency room where x-rays revealed fracture of the left foot.  He states that he was placed in a postop surgical type shoe but he was unable to wear because it was floppy.  He relates continued pain throughout the forefoot and swelling.  He comes in today with his wife for evaluation.  Initial x-rays completed at [facility].  He rates the pain a 5 out of 10.  Worse with standing and walking with associated swelling.  He is tried rest, Tylenol.  He comes in today for follow-up.    Past Medical History:   Diagnosis Date    Adenomatous polyp of colon     12/13 tubular adenoma     Benign enlargement of prostate     BRBPR (bright red blood per rectum)     Cataract     Chest pain, atypical     Diabetes (HCC)     Diabetes mellitus type 2, controlled (HCC)     Diabetic peripheral neuropathy (HCC)     Elevated blood pressure reading without diagnosis of hypertension     Fall     Glaucoma     Glaucoma     Hearing loss     Iron deficiency anemia     Mixed hyperlipidemia     Myalgia     Rectal adenocarcinoma (HCC)     Sleep apnea     did not tolerate CPAP    Type 2 diabetes, controlled, with peripheral neuropathy (HCC)     Vertigo       Past Surgical History:   Procedure Laterality Date    COLONOSCOPY  12/31/2013    Dr Cochran-8 polyps, APs and BCM, diverticula, 3 years    SIGMOIDOSCOPY  11/09/2023    Dr Cochran-Invasive adenocarcinoma, well to moderately differentiated    TONSILLECTOMY      UPPER GASTROINTESTINAL ENDOSCOPY N/A 12/05/2023    Dr YAIR Hernandez-w/Rectal

## 2025-01-13 NOTE — PROGRESS NOTES
"[facility] HEMATOLOGY & ONCOLOGY  [address]  [place] [address] [phone]    Patient Name: Juliocesar Tellez  Encounter Date: 01/21/2025  YOB: 1932  Patient Number: [id number]    REASON FOR VISIT: Juliocesar Tellez is a 89 yoM who returns in follow-up of stage III rectal adenocarcinoma.  He has received neoadjuvant FOLFOX- C1, 12/12/23; C2, 1/3/24; C3, 1/24/24; C4, 2/28/24; C5, 3/25/24-C6, 4/22/24. He has been seen in follow-up by Dr. Kelly of  surgery, 5/1/24 (below).  He has received RT (6/3/24-7/16/24 -5040 cGy/28 fx) with concurrent CIV 5-FU beginning 6/3/24; 6/10/24; 6/17/24; 6/24/24; 7/1/24; 7/8/24; 7/15/24.  The patient ultimately decided against surgery.  \"Given my age and my diabetes it will be too much for me.\" He is here with his spouse, Cristina (previously with his son, Vivek)..      I have reviewed the HPI and verified with the patient the accuracy of it. No changes to interval history since the information was documented. Damian Abraham MD 01/21/25      Diagnostic abnormalities:  --medical history includes diabetes, SARAHI, arthritis, BPH, hyperlipidemia, rectal bleeding and recently diagnosed rectal adenocarcinoma.  --9/18/23- Hgb 12.9 (pcp office)  --10/10/23-referred by Dr. Gerardo Prescott to GI for bright red rectal bleeding x 1 month.  No associated rectal pain, no weight loss no abdominal pain, no lightheadedness, dizziness nor black stool.  --11/9/23- Colonoscopy:  Rectal mass.  Malignant partially obstructing tumor from 4 to 11 cm proximal to the anus.  Biopsied.  Exam otherwise normal on direct and retroflexion views.  Final diagnosis:Large intestine, designated \"rectal mass\".  Invasive adenocarcinoma, well to moderately differentiated.  MSI by PCR: MSI-stable (LORENZO)  --11/20/23- Today the patient is seen for management considerations.  Sodium is 146 otherwise normal CMP, ferritin 24 (L), iron 46 (L), iron " saturation 13% (L), B12 380, folate 19.7, CEA 15 (H), Hgb 12.1, MCV 93.3, platelets 196,000, WBC 8.67.  --12/1/23-CT chest-No definite evidence of metastatic disease in the chest. There is a 7 mm nodular density along the right minor fissure which is likely an intrafissural lymph node or small area of atelectasis/scarring. However, recommend special attention on follow-up imaging to exclude a pulmonary nodule. Advanced pulmonary fibrosis, UIP pattern.  Mild bilateral hilar lymphadenopathy, likely reactive.   --12/1/23- CT abdomen/pelvis-  Thickening of the wall of the rectum consistent with the history of  rectal neoplasm. The outer wall of the rectum is somewhat ill-defined. Therefore, there may be some extension through the wall. In addition, the fat plane between the rectum and prostate is obscured suggesting possible extension of tumor into this area. There is a 5 mm lymph node in the left perirectal fat image 71 series 3. There is an additional small lymph node more superiorly in the pelvic mesenteric fat measuring 6-7 mm in short axis diameter. No other evidence of metastatic disease in the abdomen or pelvis. Bilateral inguinal hernias. The hernia on the right contains a portion of the bladder wall and peritoneal fat. The hernia on the left contains peritoneal fat. There is thickening of the bladder wall. The bladder is under distended. Wall thickening may be due to muscular hypertrophy as there is enlargement of the prostate. Artifact of under distention, infection  and inflammation are considered. Gallstones in the gallbladder.  --12/5/23- flexible sigmoidoscopy with rectal EUS, Dr. Calderon Cano  Known rectal mass was 80% circumferential and invasive appearing with ulceration extending from 4 cm to 14 cm  Endosonography revealed an irregular appearing heterogeneous mass measuring over 4.4 cm in greatest dimension with evidence of invasion through and obliteration of the muscularis propria.  Irregular borders  were noted with questionable spread through the rectal wall.  The lesion did not appear to invade the prostate.  No definite lymphadenopathy was appreciated.      Previous interventions:  -- Venofer  mg- 12/4/23; 12/11/23; 12/18/23; 12/27/23  -- Neoadjuvant FOLFOX- C1, 12/12/23; C2, 1/3/24; C3, 1/24/24; C4, 2/28/24;C5, 3/25/24; C6, 4/22/24.  -- CIV 5-FU beginning 6/3/24; 6/10/24; 6/17/24; 6/24/24; 7/1/24; 7/8/24; 7/15/24  -- Concurrent RT beginning 6/3/24- 7/16/24 (5040 cGy//28 fx)  --10/16/24- Follow-up Dr. Kelly-  -PROCEDURE:External examination was undertaken. RUPERTO was then performed. The scope was carefully inserted into the rectum and advanced under direct visualization to the level documented above. Careful circumferential examination of the colon and rectum was then undertaken. The scope was removed. There were no complication at the completion of the scope. FINDINGS: significant decrease in the size of the tumor, now looks like an ulcerated bed.   -ASSESSMENT AND PLAN:  Mr Tellez is a 89 yo male diagnosed with a rectal cancer. He was treated with ROSE and returns two weeks after completion for evaluation. His tumor mass is gone and there is currently an ulcerated bed in its place. He has not had any repeat imaging at this time. We discussed his wishes with regard to surgery and surveillance. If he decided that he does not want any surgery then it may not be necessary to do any further testing. If on the other hand, he is considering it, it makes sense to restage him. The other option might be to just rescope in a few months to see if the ulceration has healed and see what is left. He would like to think about his options and will get back to me regarding his decision. If he would like to consider further treatment, I would recommend a repeat MRI of his pelvis with repeat scope in about 2 months.   PLAN  Pt is deciding if he is interested in any further treatment  If yes, we will plan for MRI pelvis and  repeat flex sig in about 2 months.   If no, would not recommend any further testing.       PAST MEDICAL HISTORY:  ALLERGIES:  Allergies   Allergen Reactions    Sulfa Antibiotics Other (See Comments)     Flu like symptoms     CURRENT MEDICATIONS:  Outpatient Encounter Medications as of 1/21/2025   Medication Sig Dispense Refill    aspirin 81 MG EC tablet Take 1 tablet by mouth Daily.      atorvastatin (LIPITOR) 40 MG tablet Take 1 tablet by mouth Daily.      azelastine (ASTELIN) 0.1 % nasal spray Administer 2 sprays into the nostril(s) as directed by provider 2 (Two) Times a Day. Use in each nostril as directed      cetirizine (zyrTEC) 10 MG tablet Take 1 tablet by mouth Daily.      dicyclomine (BENTYL) 10 MG capsule Take 1 capsule by mouth 4 (Four) Times a Day Before Meals & at Bedtime.      diphenoxylate-atropine (LOMOTIL) 2.5-0.025 MG per tablet Take 1 tablet by mouth 4 (Four) Times a Day As Needed for Diarrhea. 90 tablet 0    HumaLOG Mix 75/25 KwikPen (75-25) 100 UNIT/ML suspension pen-injector pen       insulin lispro protamine-insulin lispro (humaLOG 75-25) (75-25) 100 UNIT/ML suspension injection Inject  under the skin into the appropriate area as directed Take As Directed. 60 MORNING 35 EVENING      ketoconazole (NIZORAL) 2 % cream Apply 1 Application topically to the appropriate area as directed Daily.      ketoconazole (NIZORAL) 2 % cream Apply 1 Application topically to the appropriate area as directed Daily.      lidocaine-prilocaine (EMLA) 2.5-2.5 % cream 30 minutes prior to access apply generous amount of Emla Cream to port site and cover with clear plastic wrap until ready for use 1 each 2    lisinopril (PRINIVIL,ZESTRIL) 2.5 MG tablet Take 1 tablet by mouth Daily.      magnesium oxide (MAGOX) 400 (241.3 Mg) MG tablet tablet Take 1 tablet by mouth Daily.      Menthol-Zinc Oxide (Calmoseptine) 0.44-20.6 % ointment Apply 1 Application topically to the appropriate area as directed 2 (Two) Times a Day.    "   Olopatadine HCl (PATADAY OP) Apply  to eye(s) as directed by provider.      ondansetron (Zofran) 8 MG tablet Take 1 tablet by mouth Every 8 (Eight) Hours As Needed for Nausea or Vomiting. 30 tablet 2    prochlorperazine (COMPAZINE) 10 MG tablet Take 1 tablet by mouth Every 6 (Six) Hours As Needed for Nausea or Vomiting. 60 tablet 1    tamsulosin (FLOMAX) 0.4 MG capsule 24 hr capsule TAKE ONE CAPSULE DAILY      timolol (BLOCADREN) 5 MG tablet Take 1 tablet by mouth 2 (Two) Times a Day.      TRAVOPROST OP Apply  to eye(s) as directed by provider.      [] Gadopiclenol (VUEWAY) injection 10 mL        No facility-administered encounter medications on file as of 2025.     Adult illnesses:  Arthritis  BPH  Diabetes  Hemorrhoids  Hyperlipidemia  SARAHI  Primary open-angle glaucoma  Pseudophakia  Rectal bleeding  Rectal carcinoma    Past surgeries:  Tonsillectomy  Colonoscopy with 8 polyps, tubular adenoma, few diverticula, 2013  Colonoscopy, 23-rectal mass.  Malignant partially obstructing tumor from 4 to 11 cm proximal to the anus.  Biopsied.  Exam otherwise normal on direct and retroflexion views.  Final diagnosis:Large intestine, designated \"rectal mass\": -Invasive adenocarcinoma, well to moderately differentiated  Port-A-Cath placement in the right cephalic vein, 23-Dr. Ortiz    ADULT ILLNESSES:  Patient Active Problem List   Diagnosis Code    Chest pain, atypical R07.89    Diabetes mellitus E11.9    Elevated blood pressure reading without diagnosis of hypertension R03.0    BRBPR (bright red blood per rectum) K62.5    Rectal adenocarcinoma C20    Iron deficiency anemia D50.9    Encounter for care related to vascular access port Z45.2    Non-smoker Z78.9    Rectal cancer C20    History of radiation therapy Z92.3     SURGERIES:  Past Surgical History:   Procedure Laterality Date    COLONOSCOPY  2013    8 polyps, tubular adenoma, a few divertucula    COLONOSCOPY N/A 2023    " "Procedure: SIGMOIDOSCOPY FLEXIBLE;  Surgeon: Terry Malik MD;  Location: [facility] ENDOSCOPY;  Service: Gastroenterology;  Laterality: N/A;  preop; BRBPR  postop rectal mass  PCP Gerardo Prescott    TONSILLECTOMY      VENOUS ACCESS DEVICE (PORT) INSERTION N/A 11/29/2023    Procedure: Single Lumen Port-a-cath insertion with flouroscopy;  Surgeon: Marquita Ortiz MD;  Location: [facility] OR;  Service: General;  Laterality: N/A;     HEALTH MAINTENANCE ITEMS:  Health Maintenance Due   Topic Date Due    COVID-19 Vaccine (1) Never done    Pneumococcal Vaccine 65+ (1 of 2 - PCV) Never done    DIABETIC FOOT EXAM  Never done    DIABETIC EYE EXAM  Never done    RSV Vaccine - Adults (1 - 1-dose 75+ series) Never done    ZOSTER VACCINE (1 of 2) 03/31/2014    LIPID PANEL  01/22/2019    ANNUAL WELLNESS VISIT  Never done    HEMOGLOBIN A1C  07/15/2020    TDAP/TD VACCINES (2 - Tdap) 02/21/2022    INFLUENZA VACCINE  07/01/2024    BMI FOLLOWUP  11/27/2024       <no information>  Last Completed Colonoscopy       This patient has no relevant Health Maintenance data.            There is no immunization history on file for this patient.  Last Completed Mammogram       This patient has no relevant Health Maintenance data.              FAMILY HISTORY:  Family History   Problem Relation Age of Onset    No Known Problems Mother     No Known Problems Father     Liver cancer Sister     Colon cancer Neg Hx      SOCIAL HISTORY:  Social History     Socioeconomic History    Marital status:    Tobacco Use    Smoking status: Never    Smokeless tobacco: Never   Vaping Use    Vaping status: Never Used   Substance and Sexual Activity    Alcohol use: Never    Drug use: Never    Sexual activity: Defer       REVIEW OF SYSTEMS:  Review of Systems   Constitutional:  Negative for activity change (\"about the same'), appetite change (\"I eat well for me\"), fatigue and unexpected weight loss.        Manages his ADLs, to include chores, \"some of the " "light stuff\" but not running errands and no longer drives. Is up and about, \"about 50%.\" Ambulates with a cane and walker at home    .   HENT: Negative.     Eyes: Negative.    Respiratory:  Negative for shortness of breath (Baseline exertional dyspnea but no SOB with routine activities).         SARAHI NOT on CPAP   Cardiovascular: Negative.    Gastrointestinal:  Negative for abdominal distention, abdominal pain, anal bleeding, blood in stool (with BMs. \"not like it was before\"), constipation, diarrhea (Loose stools particularly at night ~ 2-4x/day.  Uses Imodium ~ 4x/day.  Uses liners), nausea, rectal pain and vomiting.        Denies abdominal nor pelvic pain   Endocrine: Negative.    Genitourinary: Negative.    Musculoskeletal:  Positive for arthralgias (Chronic pains in hands, right ankle (prior injury)neck and lower back), back pain (Chronic), myalgias (with ambulation, \"50 feet\") and neck stiffness (Chronic \"stiffness\"). Negative for neck pain.   Skin: Negative.    Neurological: Negative.    Hematological: Negative.    Psychiatric/Behavioral: Negative.       /80   Pulse 92   Temp 98.5 °F (36.9 °C) (Temporal)   Resp 18   Ht 177.8 cm (70\")   Wt 81.6 kg (180 lb)   SpO2 94%   BMI 25.83 kg/m²  Body surface area is 2 meters squared.  Pain Score    01/21/25 1107   PainSc: 0-No pain         Physical Exam  Vitals and nursing note reviewed.   Constitutional:       Comments: Pleasant, cooperative, heavyset middle-aged elderly male.  Brought in by wheelchair.  ECOG 2 (same).      Has lost 2 lb (had gained 5 lb at his prior visit) since the last visit   HENT:      Head: Normocephalic and atraumatic.   Eyes:      General: No scleral icterus.     Extraocular Movements: Extraocular movements intact.      Pupils: Pupils are equal, round, and reactive to light.   Cardiovascular:      Rate and Rhythm: Normal rate.   Pulmonary:      Effort: Pulmonary effort is normal.      Comments: Port in the right upper chest is well " "seated  Abdominal:      Palpations: Abdomen is soft. There is no mass (RUQ fullness/hepatomegaly?).      Tenderness: There is no abdominal tenderness.   Musculoskeletal:         General: Swelling present. Normal range of motion.      Cervical back: Normal range of motion.      Right lower leg: Edema (1+ (chronic)- wearing an ankle brace for prior ankle injury) present.      Left lower leg: Edema (1+ (chronic)) present.   Lymphadenopathy:      Cervical: No cervical adenopathy.   Skin:     General: Skin is warm.   Neurological:      General: No focal deficit present.      Mental Status: He is alert and oriented to person, place, and time.   Psychiatric:         Mood and Affect: Mood normal.         Behavior: Behavior normal.         Thought Content: Thought content normal.           Assessment:   Rectal adenocarcinoma  --Original tumor stage:  TNM III (cT3, cN1, M0). MSI by PCR: MSI-stable (LORENZO)  --Original tumor burden:  --11/9/23- Colonoscopy:  Rectal mass.  Malignant partially obstructing tumor from 4 to 11 cm proximal to the anus.  Biopsied.  Exam otherwise normal on direct and retroflexion views.  Final diagnosis:Large intestine, designated \"rectal mass\".  Invasive adenocarcinoma, well to moderately differentiated.  --12/1/23- CT CAP- No mets in chest.  Thickening of the wall of the rectum consistent with the history of rectal neoplasm. The outer wall of the rectum is somewhat ill-defined. Therefore, there may be some extension through the wall. In addition, the fat plane between the rectum and prostate is obscured suggesting possible extension of tumor into this area. There is a 5 mm lymph node in the left perirectal fat image 71 series 3. There is an additional small lymph node more superiorly in the pelvic mesenteric fat measuring 6-7 mm in short axis diameter. No other evidence of mets.  --12/5/23- flexible sigmoidoscopy with rectal EUS, Dr. Calderon Cano  Known rectal mass was 80% circumferential and invasive " appearing with ulceration extending from 4 cm to 14 cm  Endosonography revealed an irregular appearing heterogeneous mass measuring over 4.4 cm in greatest dimension with evidence of invasion through and obliteration of the muscularis propria.  Irregular borders were noted with questionable spread through the rectal wall.  The lesion did not appear to invade the prostate.  No definite lymphadenopathy was appreciated.    --Complications of tumor:  --Rectal bleeding, anemia  --Tumor status:  -- Neoadjuvant FOLFOX - completed C6, 4/22/24  -  2/7/24- Consult CRC, Dr. Way/P: Mr Tellez is a 89 yo male being treated with chemotherapy for a T3N0M0 rectal cancer. I am not able to scope him today since he is in the middle of his chemotherapy. He was scheduled to come see me however cancelled the appt thinking it was not necessary at that time. I have explained to him the importance of me examining him prior to completion of his treatment so as to better plan if surgery will be necessary and if it is what surgery would be needed. I will have him come back after he has completed his chemotherapy but before he starts his radiation treatment. He has voiced understanding of the importance of this and will be sure to come to his next appt.   PLAN  Contact Dr Abraham to see when is will chemo be completed  Plan for rtc after completion of chemo but before starting xrt for flex sig  Will see back then again after he has completed his xrt.   - 5/1/24- Follow-up Dr. Way/P: Mr Tellez is a 89 yo male who has completed his chemotherapy for treatment of his rectal cancer and is here today for evaluation. I have not seen the tumor as he missed his appt that was made for him prior to starting treatment. When I saw him last he was in the middle of chemo so not advisable to do a scope at that time. He is now between his chemo and xrt treatments. Exam today showed a large mass, not obstructing, extending from the left posterior  quadrant , around the anterior midline over to the right anterior quadrant. He will start his chemo /xrt soon. At this time, the lesion is not involving his sphincters however, it would probably be in his best interest to do an APR should a surgery be considered. If continuity was restored he would require an DLI and that would then require a second operation in a 89 yo patient which would not be advisable. If he has a very good response, possibly no surgery would be needed.   Plan  Patient to return one month after completion of his chemo/xrt.   - 5/6/24- Follow-up Dr. Tim Tellez returns to the clinic today to begin radiation therapy for Stage III (cT3 cN1 M0), well to moderately differentiated rectal adenocarcinoma. This time I recommend a course of pelvic radiation along with low-dose chemotherapy with continuous infusion 5-FU.  I anticipate a dose of 5040 cGy in 28 treatment fractions to the pelvis along with 5-FU chemotherapy  -- CIV 5-FU beginning 6/3/24; 6/10/24; 6/17/24; 6/24/24  -- Concurrent RT beginning 6/3/24- 7/16/24 (28/fx)  -- 10/16/10-Tqvjul-cy Dr. Kelly-  -PROCEDURE:External examination was undertaken. RUPERTO was then performed. The scope was carefully inserted into the rectum and advanced under direct visualization to the level documented above. Careful circumferential examination of the colon and rectum was then undertaken. The scope was removed. There were no complication at the completion of the scope. FINDINGS: significant decrease in the size of the tumor, now looks like an ulcerated bed.   -ASSESSMENT AND PLAN:  Mr Tellez is a 89 yo male diagnosed with a rectal cancer. He was treated with ROSE and returns two weeks after completion for evaluation. His tumor mass is gone and there is currently an ulcerated bed in its place. He has not had any repeat imaging at this time. We discussed his wishes with regard to surgery and surveillance. If he decided that he does not want any surgery then  it may not be necessary to do any further testing. If on the other hand, he is considering it, it makes sense to restage him. The other option might be to just rescope in a few months to see if the ulceration has healed and see what is left. He would like to think about his options and will get back to me regarding his decision. If he would like to consider further treatment, I would recommend a repeat MRI of his pelvis with repeat scope in about 2 months.   PLAN  Pt is deciding if he is interested in any further treatment  If yes, we will plan for MRI pelvis and repeat flex sig in about 2 months.   If no, would not recommend any further testing.     --1/15/2025- MRI pelvis- Fibrotic signal in the mid rectum extending over a length of 3.1 cm involving the rectum from 10:00 to 4:00. No abnormal diffusion restriction within the area of fibrosis or elsewhere within the rectum. Findings are in keeping with posttreatment scarring/fibrosis. No evidence of residual active disease by MR. No lymphadenopathy. Recommend correlation with sigmoidoscopy.    2.   Anemia. Iron deficiency from rectal bleeding/malignancy/chemo/RT  --11/20/23- fe 46 (L), fe sat 13% (L), ferritin 24 (L)  --Venofer x 4-12/4-12/27/23  --Hgb 14.3, 1/21/2025 (prior linn:  Hgb 11.4)  3.   Thrombocytopenia. Chemo+RT. Resolved  -150,000, 1/21/2025  4.   Diabetes on insulin  5.   Arthritis  6.   BPH  7.   SARAHI  8.   Advanced age  9.   Borderline PS (ECOG 1-2)    Plan:  Review labs, 1/21/2025-glucose 226 otherwise normal CMP, Hgb 14.3 otherwise normal CBC  Review MRI pelvis, 1/15/25 (see above).  Findings are in keeping with posttreatment scarring/fibrosis. No evidence of residual active disease by MR    Note MMR could not be done due to lack of material.  While MSI by PCR: MSI-stable (LORENZO)  Chemo tolerance.  Grade 1 -2 diarrhea/fatigue- has received diarrhea teaching sheets.   Review NCCN guidelines 2.2023 rectal cancer-for clinical stage T4, N any - pMMR/LORENZO-  primary treatment - total neoadjuvant therapy.  FOLFOX or [place] ox- 12-16 weeks)-long course chemo/RT with capecitabine or infusional 1-EZ-yljrpmmfo (best tumor response 8 weeks after completion of RT)- reassess for surgery (Transabdominal resection or of CR consider observation.  If resection contraindciated-systemic therapy; If dMMR/MSI-H- primary treatment- NEOADJUVANT/DEFINITIVE IMMUNOTHERAPY (PREFERRED)- IO therapy for up to 6 months (nivolumab or pembrolizumab); OR Total neoadjuvant therapy (as above)  Prior follow-up Dr. Kelly, 10/16/24-  -PROCEDURE:External examination was undertaken. RUPERTO was then performed. The scope was carefully inserted into the rectum and advanced under direct visualization to the level documented above. Careful circumferential examination of the colon and rectum was then undertaken. The scope was removed. There were no complication at the completion of the scope. FINDINGS: significant decrease in the size of the tumor, now looks like an ulcerated bed (biopsy: Nondiagnostic for malignancy).   -ASSESSMENT AND PLAN:  Mr Tellez is a 89 yo male diagnosed with a rectal cancer. He was treated with ROSE and returns two weeks after completion for evaluation. His tumor mass is gone and there is currently an ulcerated bed in its place. He has not had any repeat imaging at this time. We discussed his wishes with regard to surgery and surveillance. If he decided that he does not want any surgery then it may not be necessary to do any further testing. If on the other hand, he is considering it, it makes sense to restage him. The other option might be to just rescope in a few months to see if the ulceration has healed and see what is left. He would like to think about his options and will get back to me regarding his decision. If he would like to consider further treatment, I would recommend a repeat MRI of his pelvis with repeat scope in about 2 months.   PLAN  Pt is deciding if he is interested in  "any further treatment  If yes, we will plan for MRI pelvis and repeat flex sig in about 2 months.   If no, would not recommend any further testing.     6.  Discussed most recent office visit with Dr. Kelly, 10/16/2024 (above).  The patient states he has decided against surgery.  \"Given my age and my diabetes it will be too much for me.\"  Thus discussed the option of a repeat imaging down the road and taking it from there.  Agrees.  Will order an MRI of the abdomen and pelvis.  They reserve the option to cancel the study and request palliative care/hospice, \"when the time comes\"  7.   Return to office in 24 weeks with preoffice CBC, diff and CMP    I spent ~40 minutes caring for Juliocesar on this date of service. This time includes time spent by me in the following activities: preparing for the visit, reviewing tests, performing a medically appropriate examination and/or evaluation, counseling and educating the patient/family/caregiver, ordering medications, tests, or procedures and documenting information in the medical record.       "

## 2025-01-14 ENCOUNTER — OFFICE VISIT (OUTPATIENT)
Age: 89
End: 2025-01-14
Payer: MEDICARE

## 2025-01-14 ENCOUNTER — TELEPHONE (OUTPATIENT)
Age: 89
End: 2025-01-14

## 2025-01-14 VITALS — BODY MASS INDEX: 25.77 KG/M2 | HEIGHT: 70 IN | WEIGHT: 180 LBS

## 2025-01-14 DIAGNOSIS — S92.355D CLOSED NONDISPLACED FRACTURE OF FIFTH METATARSAL BONE OF LEFT FOOT WITH ROUTINE HEALING, SUBSEQUENT ENCOUNTER: Primary | ICD-10-CM

## 2025-01-14 PROCEDURE — 1123F ACP DISCUSS/DSCN MKR DOCD: CPT | Performed by: NURSE PRACTITIONER

## 2025-01-14 PROCEDURE — 99213 OFFICE O/P EST LOW 20 MIN: CPT | Performed by: NURSE PRACTITIONER

## 2025-01-14 PROCEDURE — 1159F MED LIST DOCD IN RCRD: CPT | Performed by: NURSE PRACTITIONER

## 2025-01-14 ASSESSMENT — ENCOUNTER SYMPTOMS
NAUSEA: 0
CONSTIPATION: 0
BLOOD IN STOOL: 0
VOMITING: 0
COUGH: 0
SHORTNESS OF BREATH: 0
DIARRHEA: 0
COLOR CHANGE: 0

## 2025-01-14 NOTE — PROGRESS NOTES
[facility]  [facility]  [address]  [facility] [id number] [phone]     Patient: Vin Streeter   YOB: 1932   Date: 1/14/2025   Visit Type:      History of Present Illness  Chief Complaint   Patient presents with    Foot Pain     Left foot       This is a 89 y.o. male presents today for follow-up of fifth metatarsal fracture.  Initially seen on 12/3/2024.  He had a fall 3 weeks prior to his initial exam.  X-rays were reviewed on his last exam he was recommended use of a carbon fiber insole.  He did get the carbon fiber insole.  He presents with his wife.  She states that he has been really avoiding weight to the foot because he is scared to place weight.  Has been utilizing a wheelchair.  He comes in today for evaluation.  He did see his primary care provider yesterday.    Past Medical History:   Diagnosis Date    Adenomatous polyp of colon     12/13 tubular adenoma     Benign enlargement of prostate     BRBPR (bright red blood per rectum)     Cataract     Chest pain, atypical     Diabetes (HCC)     Diabetes mellitus type 2, controlled (HCC)     Diabetic peripheral neuropathy (HCC)     Elevated blood pressure reading without diagnosis of hypertension     Fall     Glaucoma     Glaucoma     Hearing loss     Iron deficiency anemia     Mixed hyperlipidemia     Myalgia     Rectal adenocarcinoma (HCC)     Sleep apnea     did not tolerate CPAP    Type 2 diabetes, controlled, with peripheral neuropathy (HCC)     Vertigo       Past Surgical History:   Procedure Laterality Date    COLONOSCOPY  12/31/2013    Dr Cochran-8 polyps, APs and BCM, diverticula, 3 years    SIGMOIDOSCOPY  11/09/2023    Dr Cochran-Invasive adenocarcinoma, well to moderately differentiated    TONSILLECTOMY      UPPER GASTROINTESTINAL ENDOSCOPY N/A 12/05/2023    Dr YAIR Hernandez-w/Rectal EUS-Rectal adenocarcinoma c/w at least T3 disease      Social History

## 2025-01-14 NOTE — TELEPHONE ENCOUNTER
[facility] does not take this patient's insurance and [facility] is currently not accepting patients due to a backlog.  Explained this to the patient's wife and she stated she would check with her PCP in [place] to see if he had any ideas.

## 2025-01-14 NOTE — PROGRESS NOTES
Chief Complaint    Chief Complaint   Patient presents with    Foot Pain     Left foot        Body Part: Foot left    When did the symptoms begin/Date of Onset?2 months ago    Where did the injury happen? Home    How did the injury happen? Had a fall    If over 55, have you barker an Osteoporosis Screening in the last 2 years? No    Injury Details    Previous similar problems or complaints? No    Severity of Pain: 4    Character of Pain: Achy    What makes your symptoms worse? Standing    How long does the pain last? Intermittent    Associated Symptoms: Swelling    What makes your symptoms better? Rest    Previous Treatment for the Problem: Other: NA    Any special diagnostic tests or studies done? X-Rays; Where? [facility]    Similar complaints on opposite side? No    Review of Systems    Review of Systems   Constitutional:  Negative for appetite change, chills, fatigue and fever.   Respiratory:  Negative for cough and shortness of breath.    Cardiovascular:  Negative for chest pain, palpitations and leg swelling.   Gastrointestinal:  Negative for blood in stool, constipation, diarrhea, nausea and vomiting.   Musculoskeletal:  Negative for arthralgias, gait problem and joint swelling.   Skin:  Negative for color change.   Neurological:  Negative for weakness.

## 2025-01-14 NOTE — TELEPHONE ENCOUNTER
Dorothea from [facility], said that they received orders for home care and they are not in network with [employer] [place]

## 2025-01-15 ENCOUNTER — HOSPITAL ENCOUNTER (OUTPATIENT)
Dept: MRI IMAGING | Facility: HOSPITAL | Age: OVER 89
Discharge: HOME | End: 2025-01-15
Admitting: INTERNAL MEDICINE
Payer: MEDICARE

## 2025-01-15 ENCOUNTER — HOSPITAL ENCOUNTER (OUTPATIENT)
Dept: MRI IMAGING | Facility: HOSPITAL | Age: OVER 89
End: 2025-01-15
Payer: MEDICARE

## 2025-01-15 DIAGNOSIS — C20 RECTAL CANCER: ICD-10-CM

## 2025-01-15 LAB — CREAT BLDA-MCNC: 1.2 MG/DL (ref 0.6–1.3)

## 2025-01-15 PROCEDURE — 82565 ASSAY OF CREATININE: CPT

## 2025-01-15 PROCEDURE — 72197 MRI PELVIS W/O & W/DYE: CPT

## 2025-01-15 PROCEDURE — A9579 GAD-BASE MR CONTRAST NOS,1ML: HCPCS | Performed by: INTERNAL MEDICINE

## 2025-01-15 PROCEDURE — 25510000001 GADOPICLENOL 0.5 MMOL/ML SOLUTION: Performed by: INTERNAL MEDICINE

## 2025-01-15 RX ADMIN — GADOPICLENOL 8.5 ML: 485.1 INJECTION INTRAVENOUS at 11:11

## 2025-01-16 ENCOUNTER — TELEPHONE (OUTPATIENT)
Dept: ONCOLOGY | Facility: CLINIC | Age: OVER 89
End: 2025-01-16
Payer: MEDICARE

## 2025-01-16 NOTE — TELEPHONE ENCOUNTER
----- Message from Damian Abraham sent at 1/15/2025  4:23 PM CST -----  Orally hydrate 1 liter daily. Repeat creatinine 1 week  ----- Message -----  From: Lab, Background User  Sent: 1/15/2025   4:12 PM CST  To: Damian Abraham MD

## 2025-01-21 ENCOUNTER — OFFICE VISIT (OUTPATIENT)
Dept: ONCOLOGY | Facility: CLINIC | Age: OVER 89
End: 2025-01-21
Payer: MEDICARE

## 2025-01-21 ENCOUNTER — LAB (OUTPATIENT)
Dept: LAB | Facility: HOSPITAL | Age: OVER 89
End: 2025-01-21
Payer: MEDICARE

## 2025-01-21 VITALS
HEART RATE: 92 BPM | HEIGHT: 70 IN | RESPIRATION RATE: 18 BRPM | WEIGHT: 180 LBS | BODY MASS INDEX: 25.77 KG/M2 | DIASTOLIC BLOOD PRESSURE: 80 MMHG | OXYGEN SATURATION: 94 % | TEMPERATURE: 98.5 F | SYSTOLIC BLOOD PRESSURE: 138 MMHG

## 2025-01-21 DIAGNOSIS — C20 RECTAL ADENOCARCINOMA: Primary | ICD-10-CM

## 2025-01-21 DIAGNOSIS — C20 RECTAL CANCER: ICD-10-CM

## 2025-01-21 LAB
ALBUMIN SERPL-MCNC: 3.6 G/DL (ref 3.5–5.2)
ALBUMIN/GLOB SERPL: 1 G/DL
ALP SERPL-CCNC: 101 U/L (ref 39–117)
ALT SERPL W P-5'-P-CCNC: 21 U/L (ref 1–41)
ANION GAP SERPL CALCULATED.3IONS-SCNC: 7 MMOL/L (ref 5–15)
AST SERPL-CCNC: 22 U/L (ref 1–40)
BASOPHILS # BLD AUTO: 0.03 10*3/MM3 (ref 0–0.2)
BASOPHILS NFR BLD AUTO: 0.4 % (ref 0–1.5)
BILIRUB SERPL-MCNC: 0.6 MG/DL (ref 0–1.2)
BUN SERPL-MCNC: 13 MG/DL (ref 8–23)
BUN/CREAT SERPL: 16.5 (ref 7–25)
CALCIUM SPEC-SCNC: 9.2 MG/DL (ref 8.2–9.6)
CHLORIDE SERPL-SCNC: 104 MMOL/L (ref 98–107)
CO2 SERPL-SCNC: 32 MMOL/L (ref 22–29)
CREAT SERPL-MCNC: 0.79 MG/DL (ref 0.76–1.27)
DEPRECATED RDW RBC AUTO: 46.1 FL (ref 37–54)
EGFRCR SERPLBLD CKD-EPI 2021: 83.3 ML/MIN/1.73
EOSINOPHIL # BLD AUTO: 0.23 10*3/MM3 (ref 0–0.4)
EOSINOPHIL NFR BLD AUTO: 2.9 % (ref 0.3–6.2)
ERYTHROCYTE [DISTWIDTH] IN BLOOD BY AUTOMATED COUNT: 13.3 % (ref 12.3–15.4)
GLOBULIN UR ELPH-MCNC: 3.5 GM/DL
GLUCOSE SERPL-MCNC: 226 MG/DL (ref 65–99)
HCT VFR BLD AUTO: 42.1 % (ref 37.5–51)
HGB BLD-MCNC: 14.3 G/DL (ref 13–17.7)
IMM GRANULOCYTES # BLD AUTO: 0.03 10*3/MM3 (ref 0–0.05)
IMM GRANULOCYTES NFR BLD AUTO: 0.4 % (ref 0–0.5)
LYMPHOCYTES # BLD AUTO: 1.51 10*3/MM3 (ref 0.7–3.1)
LYMPHOCYTES NFR BLD AUTO: 19 % (ref 19.6–45.3)
MCH RBC QN AUTO: 32.2 PG (ref 26.6–33)
MCHC RBC AUTO-ENTMCNC: 34 G/DL (ref 31.5–35.7)
MCV RBC AUTO: 94.8 FL (ref 79–97)
MONOCYTES # BLD AUTO: 0.78 10*3/MM3 (ref 0.1–0.9)
MONOCYTES NFR BLD AUTO: 9.8 % (ref 5–12)
NEUTROPHILS NFR BLD AUTO: 5.37 10*3/MM3 (ref 1.7–7)
NEUTROPHILS NFR BLD AUTO: 67.5 % (ref 42.7–76)
PLATELET # BLD AUTO: 150 10*3/MM3 (ref 140–450)
PMV BLD AUTO: 9.1 FL (ref 6–12)
POTASSIUM SERPL-SCNC: 4.1 MMOL/L (ref 3.5–5.2)
PROT SERPL-MCNC: 7.1 G/DL (ref 6–8.5)
RBC # BLD AUTO: 4.44 10*6/MM3 (ref 4.14–5.8)
SODIUM SERPL-SCNC: 143 MMOL/L (ref 136–145)
WBC NRBC COR # BLD AUTO: 7.95 10*3/MM3 (ref 3.4–10.8)

## 2025-01-21 PROCEDURE — 36415 COLL VENOUS BLD VENIPUNCTURE: CPT

## 2025-01-21 PROCEDURE — 80053 COMPREHEN METABOLIC PANEL: CPT

## 2025-01-21 PROCEDURE — 85025 COMPLETE CBC W/AUTO DIFF WBC: CPT

## 2025-07-21 NOTE — PROGRESS NOTES
"[facility] HEMATOLOGY & ONCOLOGY  [address]  [facility] [place] [address] [phone]    Patient Name: Juliocesar Tellez  Encounter Date: 07/29/2025  YOB: 1932  Patient Number: [id number]    REASON FOR VISIT: Juliocesar Tellez is a 89 yoM who returns in follow-up of stage III rectal adenocarcinoma.  He has received neoadjuvant FOLFOX- C1, 12/12/23; C2, 1/3/24; C3, 1/24/24; C4, 2/28/24; C5, 3/25/24-C6, 4/22/24. He has been seen in follow-up by Dr. Kelly of  surgery, 5/1/24 (below).  He has received RT (6/3/24-7/16/24 -5040 cGy/28 fx) with concurrent CIV 5-FU beginning 6/3/24; 6/10/24; 6/17/24; 6/24/24; 7/1/24; 7/8/24; 7/15/24.  The patient ultimately decided against surgery.  Previously stated that, \"at my age and my diabetes it will be too much for me.\"  He is here with his spouse, Cristina (previously with his son, Vivek)..     I have reviewed the HPI and verified with the patient the accuracy of it. No changes to interval history since the information was documented. Damian Abraham MD 07/29/25      Diagnostic abnormalities:  --medical history includes diabetes, SARAHI, arthritis, BPH, hyperlipidemia, rectal bleeding and recently diagnosed rectal adenocarcinoma.  --9/18/23- Hgb 12.9 (pcp office)  --10/10/23-referred by Dr. Gerardo Prescott to GI for bright red rectal bleeding x 1 month.  No associated rectal pain, no weight loss no abdominal pain, no lightheadedness, dizziness nor black stool.  --11/9/23- Colonoscopy:  Rectal mass.  Malignant partially obstructing tumor from 4 to 11 cm proximal to the anus.  Biopsied.  Exam otherwise normal on direct and retroflexion views.  Final diagnosis:Large intestine, designated \"rectal mass\".  Invasive adenocarcinoma, well to moderately differentiated.  MSI by PCR: MSI-stable (LORENZO)  --11/20/23- Today the patient is seen for management considerations.  Sodium is 146 otherwise normal CMP, ferritin 24 " (L), iron 46 (L), iron saturation 13% (L), B12 380, folate 19.7, CEA 15 (H), Hgb 12.1, MCV 93.3, platelets 196,000, WBC 8.67.  --12/1/23-CT chest-No definite evidence of metastatic disease in the chest. There is a 7 mm nodular density along the right minor fissure which is likely an intrafissural lymph node or small area of atelectasis/scarring. However, recommend special attention on follow-up imaging to exclude a pulmonary nodule. Advanced pulmonary fibrosis, UIP pattern.  Mild bilateral hilar lymphadenopathy, likely reactive.   --12/1/23- CT abdomen/pelvis-  Thickening of the wall of the rectum consistent with the history of  rectal neoplasm. The outer wall of the rectum is somewhat ill-defined. Therefore, there may be some extension through the wall. In addition, the fat plane between the rectum and prostate is obscured suggesting possible extension of tumor into this area. There is a 5 mm lymph node in the left perirectal fat image 71 series 3. There is an additional small lymph node more superiorly in the pelvic mesenteric fat measuring 6-7 mm in short axis diameter. No other evidence of metastatic disease in the abdomen or pelvis. Bilateral inguinal hernias. The hernia on the right contains a portion of the bladder wall and peritoneal fat. The hernia on the left contains peritoneal fat. There is thickening of the bladder wall. The bladder is under distended. Wall thickening may be due to muscular hypertrophy as there is enlargement of the prostate. Artifact of under distention, infection  and inflammation are considered. Gallstones in the gallbladder.  --12/5/23- flexible sigmoidoscopy with rectal EUS, Dr. Calderon Cano  Known rectal mass was 80% circumferential and invasive appearing with ulceration extending from 4 cm to 14 cm  Endosonography revealed an irregular appearing heterogeneous mass measuring over 4.4 cm in greatest dimension with evidence of invasion through and obliteration of the muscularis  propria.  Irregular borders were noted with questionable spread through the rectal wall.  The lesion did not appear to invade the prostate.  No definite lymphadenopathy was appreciated.      Previous interventions:  -- Venofer  mg- 12/4/23; 12/11/23; 12/18/23; 12/27/23  -- Neoadjuvant FOLFOX- C1, 12/12/23; C2, 1/3/24; C3, 1/24/24; C4, 2/28/24;C5, 3/25/24; C6, 4/22/24.  -- CIV 5-FU beginning 6/3/24; 6/10/24; 6/17/24; 6/24/24; 7/1/24; 7/8/24; 7/15/24  -- Concurrent RT beginning 6/3/24- 7/16/24 (5040 cGy//28 fx)  --10/16/24- Follow-up Dr. Kelly-  -PROCEDURE:External examination was undertaken. RUPERTO was then performed. The scope was carefully inserted into the rectum and advanced under direct visualization to the level documented above. Careful circumferential examination of the colon and rectum was then undertaken. The scope was removed. There were no complication at the completion of the scope. FINDINGS: significant decrease in the size of the tumor, now looks like an ulcerated bed.   -ASSESSMENT AND PLAN:  Mr Tellez is a 89 yo male diagnosed with a rectal cancer. He was treated with ROSE and returns two weeks after completion for evaluation. His tumor mass is gone and there is currently an ulcerated bed in its place. He has not had any repeat imaging at this time. We discussed his wishes with regard to surgery and surveillance. If he decided that he does not want any surgery then it may not be necessary to do any further testing. If on the other hand, he is considering it, it makes sense to restage him. The other option might be to just rescope in a few months to see if the ulceration has healed and see what is left. He would like to think about his options and will get back to me regarding his decision. If he would like to consider further treatment, I would recommend a repeat MRI of his pelvis with repeat scope in about 2 months.   PLAN  Pt is deciding if he is interested in any further treatment  If yes, we  will plan for MRI pelvis and repeat flex sig in about 2 months.   If no, would not recommend any further testing.       PAST MEDICAL HISTORY:  ALLERGIES:  Allergies   Allergen Reactions    Sulfa Antibiotics Other (See Comments)     Flu like symptoms     CURRENT MEDICATIONS:  Outpatient Encounter Medications as of 7/29/2025   Medication Sig Dispense Refill    aspirin 81 MG EC tablet Take 1 tablet by mouth Daily.      atorvastatin (LIPITOR) 40 MG tablet Take 1 tablet by mouth Daily.      azelastine (ASTELIN) 0.1 % nasal spray Administer 2 sprays into the nostril(s) as directed by provider 2 (Two) Times a Day. Use in each nostril as directed      cetirizine (zyrTEC) 10 MG tablet Take 1 tablet by mouth Daily.      dicyclomine (BENTYL) 10 MG capsule Take 1 capsule by mouth 4 (Four) Times a Day Before Meals & at Bedtime.      diphenoxylate-atropine (LOMOTIL) 2.5-0.025 MG per tablet Take 1 tablet by mouth 4 (Four) Times a Day As Needed for Diarrhea. 90 tablet 0    HumaLOG Mix 75/25 KwikPen (75-25) 100 UNIT/ML suspension pen-injector pen       insulin lispro protamine-insulin lispro (humaLOG 75-25) (75-25) 100 UNIT/ML suspension injection Inject  under the skin into the appropriate area as directed Take As Directed. 60 MORNING 35 EVENING      ketoconazole (NIZORAL) 2 % cream Apply 1 Application topically to the appropriate area as directed Daily.      ketoconazole (NIZORAL) 2 % cream Apply 1 Application topically to the appropriate area as directed Daily.      lidocaine-prilocaine (EMLA) 2.5-2.5 % cream 30 minutes prior to access apply generous amount of Emla Cream to port site and cover with clear plastic wrap until ready for use 1 each 2    lisinopril (PRINIVIL,ZESTRIL) 2.5 MG tablet Take 1 tablet by mouth Daily.      magnesium oxide (MAGOX) 400 (241.3 Mg) MG tablet tablet Take 1 tablet by mouth Daily.      Menthol-Zinc Oxide (Calmoseptine) 0.44-20.6 % ointment Apply 1 Application topically to the appropriate area as  "directed 2 (Two) Times a Day.      Olopatadine HCl (PATADAY OP) Apply  to eye(s) as directed by provider.      ondansetron (Zofran) 8 MG tablet Take 1 tablet by mouth Every 8 (Eight) Hours As Needed for Nausea or Vomiting. 30 tablet 2    prochlorperazine (COMPAZINE) 10 MG tablet Take 1 tablet by mouth Every 6 (Six) Hours As Needed for Nausea or Vomiting. 60 tablet 1    tamsulosin (FLOMAX) 0.4 MG capsule 24 hr capsule TAKE ONE CAPSULE DAILY      timolol (BLOCADREN) 5 MG tablet Take 1 tablet by mouth 2 (Two) Times a Day.      TRAVOPROST OP Apply  to eye(s) as directed by provider.       No facility-administered encounter medications on file as of 7/29/2025.     Adult illnesses:  Arthritis  BPH  Diabetes  Hemorrhoids  Hyperlipidemia  SARAHI  Primary open-angle glaucoma  Pseudophakia  Rectal bleeding  Rectal carcinoma    Past surgeries:  Tonsillectomy  Colonoscopy with 8 polyps, tubular adenoma, few diverticula, 12/31/2013  Colonoscopy, 11/9/23-rectal mass.  Malignant partially obstructing tumor from 4 to 11 cm proximal to the anus.  Biopsied.  Exam otherwise normal on direct and retroflexion views.  Final diagnosis:Large intestine, designated \"rectal mass\": -Invasive adenocarcinoma, well to moderately differentiated  Port-A-Cath placement in the right cephalic vein, 11/29/23-Dr. Ortiz    ADULT ILLNESSES:  Patient Active Problem List   Diagnosis Code    Chest pain, atypical R07.89    Diabetes mellitus E11.9    Elevated blood pressure reading without diagnosis of hypertension R03.0    BRBPR (bright red blood per rectum) K62.5    Rectal adenocarcinoma C20    Iron deficiency anemia D50.9    Encounter for care related to vascular access port Z45.2    Non-smoker Z78.9    Rectal cancer C20    History of radiation therapy Z92.3     SURGERIES:  Past Surgical History:   Procedure Laterality Date    COLONOSCOPY  12/31/2013    8 polyps, tubular adenoma, a few divertucula    COLONOSCOPY N/A 11/09/2023    Procedure: SIGMOIDOSCOPY " "FLEXIBLE;  Surgeon: Terry Malik MD;  Location: [facility] ENDOSCOPY;  Service: Gastroenterology;  Laterality: N/A;  preop; BRBPR  postop rectal mass  PCP Gerardo Prescott    TONSILLECTOMY      VENOUS ACCESS DEVICE (PORT) INSERTION N/A 11/29/2023    Procedure: Single Lumen Port-a-cath insertion with flouroscopy;  Surgeon: Marquita Ortiz MD;  Location: [facility] OR;  Service: General;  Laterality: N/A;     HEALTH MAINTENANCE ITEMS:  Health Maintenance Due   Topic Date Due    COVID-19 Vaccine (1) Never done    DIABETIC FOOT EXAM  Never done    DIABETIC EYE EXAM  Never done    URINE MICROALBUMIN-CREATININE RATIO (uACR)  Never done    RSV Vaccine - Adults (1 - 1-dose 75+ series) Never done    ZOSTER VACCINE (1 of 2) 03/31/2014    ANNUAL WELLNESS VISIT  Never done    HEMOGLOBIN A1C  07/15/2020    TDAP/TD VACCINES (2 - Tdap) 02/21/2022       <no information>  Last Completed Colonoscopy    This patient has no relevant Health Maintenance data.         There is no immunization history on file for this patient.  Last Completed Mammogram    This patient has no relevant Health Maintenance data.           FAMILY HISTORY:  Family History   Problem Relation Age of Onset    No Known Problems Mother     No Known Problems Father     Liver cancer Sister     Colon cancer Neg Hx      SOCIAL HISTORY:  Social History     Socioeconomic History    Marital status:    Tobacco Use    Smoking status: Never    Smokeless tobacco: Never   Vaping Use    Vaping status: Never Used   Substance and Sexual Activity    Alcohol use: Never    Drug use: Never    Sexual activity: Defer       REVIEW OF SYSTEMS:  Review of Systems   Constitutional:  Negative for activity change (\"about the same'), appetite change (\"I eat well for me\"), fatigue and unexpected weight loss.        Manages his ADLs, to include chores, \"I still ride the lawnmower\" but not running errands and no longer drives. Is up and about, \"at least 50%.\" Ambulates with a cane and " "walker at home.  Uses a wheelchair when venturing further afield.  \"My right foot/ankle is bad for the past 35 years.\"    .   HENT: Negative.     Eyes: Negative.    Respiratory:  Negative for shortness of breath (Baseline exertional dyspnea but no SOB with routine activities).         SARAHI NOT on CPAP   Cardiovascular: Negative.    Gastrointestinal:  Negative for abdominal distention, abdominal pain, anal bleeding, blood in stool (with BMs. \"not like it was before\"), constipation, diarrhea (Loose stools particularly at night ~ 2-4x/day.  Has not been using Imodium.  Uses liners), nausea, rectal pain and vomiting.        Denies abdominal nor pelvic pain   Endocrine: Negative.    Genitourinary: Negative.    Musculoskeletal:  Positive for arthralgias (Chronic pains in hands, right ankle (prior injury)neck and lower back), back pain (Chronic), myalgias (with ambulation, \"50 feet\") and neck stiffness (Chronic \"stiffness\"). Negative for neck pain.   Skin: Negative.    Neurological: Negative.    Hematological: Negative.    Psychiatric/Behavioral: Negative.         /70   Pulse 87   Temp 98.1 °F (36.7 °C) (Temporal)   Resp 18   Ht 177.8 cm (70\")   Wt 81.6 kg (180 lb)   SpO2 92%   BMI 25.83 kg/m²  Body surface area is 2 meters squared.  Pain Score    07/29/25 1109   PainSc: 0-No pain       Physical Exam  Vitals and nursing note reviewed.   Constitutional:       Comments: Pleasant, cooperative, heavyset middle-aged elderly male.  Brought in by wheelchair.  ECOG 2 (same).      Has no weight changes (had lost 2 lb at his prior visit) since the last visit   HENT:      Head: Normocephalic and atraumatic.   Eyes:      General: No scleral icterus.     Extraocular Movements: Extraocular movements intact.      Pupils: Pupils are equal, round, and reactive to light.   Cardiovascular:      Rate and Rhythm: Normal rate.   Pulmonary:      Effort: Pulmonary effort is normal.      Comments: Port in the right upper chest is well " "seated.  Wants the device removed.  \"It bothers me\".  Abdominal:      Palpations: Abdomen is soft. There is no mass (RUQ fullness/hepatomegaly?).      Tenderness: There is no abdominal tenderness.   Musculoskeletal:         General: Swelling present. Normal range of motion.      Cervical back: Normal range of motion.      Right lower leg: Edema (1+ (chronic)- wearing an ankle brace for prior ankle injury) present.      Left lower leg: Edema (1+ (chronic)) present.   Lymphadenopathy:      Cervical: No cervical adenopathy.   Skin:     General: Skin is warm.   Neurological:      General: No focal deficit present.      Mental Status: He is alert and oriented to person, place, and time.   Psychiatric:         Mood and Affect: Mood normal.         Behavior: Behavior normal.         Thought Content: Thought content normal.           Assessment:   Rectal adenocarcinoma  --Original tumor stage:  TNM III (cT3, cN1, M0). MSI by PCR: MSI-stable (LORENZO)  --Original tumor burden:  --11/9/23- Colonoscopy:  Rectal mass.  Malignant partially obstructing tumor from 4 to 11 cm proximal to the anus.  Biopsied.  Exam otherwise normal on direct and retroflexion views.  Final diagnosis:Large intestine, designated \"rectal mass\".  Invasive adenocarcinoma, well to moderately differentiated.  --12/1/23- CT CAP- No mets in chest.  Thickening of the wall of the rectum consistent with the history of rectal neoplasm. The outer wall of the rectum is somewhat ill-defined. Therefore, there may be some extension through the wall. In addition, the fat plane between the rectum and prostate is obscured suggesting possible extension of tumor into this area. There is a 5 mm lymph node in the left perirectal fat image 71 series 3. There is an additional small lymph node more superiorly in the pelvic mesenteric fat measuring 6-7 mm in short axis diameter. No other evidence of mets.  --12/5/23- flexible sigmoidoscopy with rectal EUS, Dr. Calderon Cano  Known " rectal mass was 80% circumferential and invasive appearing with ulceration extending from 4 cm to 14 cm  Endosonography revealed an irregular appearing heterogeneous mass measuring over 4.4 cm in greatest dimension with evidence of invasion through and obliteration of the muscularis propria.  Irregular borders were noted with questionable spread through the rectal wall.  The lesion did not appear to invade the prostate.  No definite lymphadenopathy was appreciated.    --Complications of tumor:  --Rectal bleeding, anemia  --Tumor status:  -- Neoadjuvant FOLFOX - completed C6, 4/22/24  -  2/7/24- Consult CRC, Dr. Kelly-A/P: Mr Tellez is a 89 yo male being treated with chemotherapy for a T3N0M0 rectal cancer. I am not able to scope him today since he is in the middle of his chemotherapy. He was scheduled to come see me however cancelled the appt thinking it was not necessary at that time. I have explained to him the importance of me examining him prior to completion of his treatment so as to better plan if surgery will be necessary and if it is what surgery would be needed. I will have him come back after he has completed his chemotherapy but before he starts his radiation treatment. He has voiced understanding of the importance of this and will be sure to come to his next appt.   PLAN  Contact Dr Abraham to see when is will chemo be completed  Plan for rtc after completion of chemo but before starting xrt for flex sig  Will see back then again after he has completed his xrt.   - 5/1/24- Follow-up Dr. Kelly-TOBI/P: Mr Tellez is a 89 yo male who has completed his chemotherapy for treatment of his rectal cancer and is here today for evaluation. I have not seen the tumor as he missed his appt that was made for him prior to starting treatment. When I saw him last he was in the middle of chemo so not advisable to do a scope at that time. He is now between his chemo and xrt treatments. Exam today showed a large mass, not  obstructing, extending from the left posterior quadrant , around the anterior midline over to the right anterior quadrant. He will start his chemo /xrt soon. At this time, the lesion is not involving his sphincters however, it would probably be in his best interest to do an APR should a surgery be considered. If continuity was restored he would require an DLI and that would then require a second operation in a 89 yo patient which would not be advisable. If he has a very good response, possibly no surgery would be needed.   Plan  Patient to return one month after completion of his chemo/xrt.   - 5/6/24- Follow-up Dr. Tim Tellez returns to the clinic today to begin radiation therapy for Stage III (cT3 cN1 M0), well to moderately differentiated rectal adenocarcinoma. This time I recommend a course of pelvic radiation along with low-dose chemotherapy with continuous infusion 5-FU.  I anticipate a dose of 5040 cGy in 28 treatment fractions to the pelvis along with 5-FU chemotherapy  -- CIV 5-FU beginning 6/3/24; 6/10/24; 6/17/24; 6/24/24  -- Concurrent RT beginning 6/3/24- 7/16/24 (28/fx)  -- 10/16/16-Nczyxb-qh Dr. Kelly-  -PROCEDURE:External examination was undertaken. RUPERTO was then performed. The scope was carefully inserted into the rectum and advanced under direct visualization to the level documented above. Careful circumferential examination of the colon and rectum was then undertaken. The scope was removed. There were no complication at the completion of the scope. FINDINGS: significant decrease in the size of the tumor, now looks like an ulcerated bed.   -ASSESSMENT AND PLAN:  Mr Tellez is a 89 yo male diagnosed with a rectal cancer. He was treated with ROSE and returns two weeks after completion for evaluation. His tumor mass is gone and there is currently an ulcerated bed in its place. He has not had any repeat imaging at this time. We discussed his wishes with regard to surgery and surveillance. If he  decided that he does not want any surgery then it may not be necessary to do any further testing. If on the other hand, he is considering it, it makes sense to restage him. The other option might be to just rescope in a few months to see if the ulceration has healed and see what is left. He would like to think about his options and will get back to me regarding his decision. If he would like to consider further treatment, I would recommend a repeat MRI of his pelvis with repeat scope in about 2 months.   PLAN  Pt is deciding if he is interested in any further treatment  If yes, we will plan for MRI pelvis and repeat flex sig in about 2 months.   If no, would not recommend any further testing.     --1/15/2025- MRI pelvis- Fibrotic signal in the mid rectum extending over a length of 3.1 cm involving the rectum from 10:00 to 4:00. No abnormal diffusion restriction within the area of fibrosis or elsewhere within the rectum. Findings are in keeping with posttreatment scarring/fibrosis. No evidence of residual active disease by MR. No lymphadenopathy. Recommend correlation with sigmoidoscopy.    2.   Anemia. Iron deficiency from rectal bleeding/malignancy/chemo/RT  --11/20/23- fe 46 (L), fe sat 13% (L), ferritin 24 (L)  --Venofer x 4-12/4-12/27/23  --Hgb 15.3, 7/29/2025 (prior linn:  Hgb 11.4)  3.   Thrombocytopenia. Chemo+RT.   -135,000, 7/29/2025  4.   Diabetes on insulin  5.   Arthritis  6.   BPH  7.   SARAHI  8.   Advanced age  9.   Borderline PS (ECOG 1-2)    Plan:  Review labs, 7/29/2025-glucose 173 otherwise normal CMP, Hgb 15.3 otherwise normal CBC    Prior review MRI pelvis, 1/15/25 (see above).  Findings are in keeping with posttreatment scarring/fibrosis. No evidence of residual active disease by MR    Note MMR could not be done due to lack of material.  While MSI by PCR: MSI-stable (LORENZO)  Review NCCN guidelines 2.2023 rectal cancer-for clinical stage T4, N any - pMMR/LORENZO- primary treatment - total neoadjuvant  therapy.  FOLFOX or [place] ox- 12-16 weeks)-long course chemo/RT with capecitabine or infusional 3-OS-sbqekjwrn (best tumor response 8 weeks after completion of RT)- reassess for surgery (Transabdominal resection or of CR consider observation.  If resection contraindciated-systemic therapy; If dMMR/MSI-H- primary treatment- NEOADJUVANT/DEFINITIVE IMMUNOTHERAPY (PREFERRED)- IO therapy for up to 6 months (nivolumab or pembrolizumab); OR Total neoadjuvant therapy (as above)  Prior follow-up Dr. Kelly, 10/16/24-  -PROCEDURE:External examination was undertaken. RUPERTO was then performed. The scope was carefully inserted into the rectum and advanced under direct visualization to the level documented above. Careful circumferential examination of the colon and rectum was then undertaken. The scope was removed. There were no complication at the completion of the scope. FINDINGS: significant decrease in the size of the tumor, now looks like an ulcerated bed (biopsy: Nondiagnostic for malignancy).   -ASSESSMENT AND PLAN:  Mr Tellez is a 89 yo male diagnosed with a rectal cancer. He was treated with ROSE and returns two weeks after completion for evaluation. His tumor mass is gone and there is currently an ulcerated bed in its place. He has not had any repeat imaging at this time. We discussed his wishes with regard to surgery and surveillance. If he decided that he does not want any surgery then it may not be necessary to do any further testing. If on the other hand, he is considering it, it makes sense to restage him. The other option might be to just rescope in a few months to see if the ulceration has healed and see what is left. He would like to think about his options and will get back to me regarding his decision. If he would like to consider further treatment, I would recommend a repeat MRI of his pelvis with repeat scope in about 2 months.   PLAN  Pt is deciding if he is interested in any further treatment  If yes, we will  "plan for MRI pelvis and repeat flex sig in about 2 months.   If no, would not recommend any further testing.     6.  Again discussed most recent office visit with Dr. Kelly, 10/16/2024 (above).  The patient states he has decided against surgery.  \"At my age and my diabetes it will be too much for me.\"  Thus discussed the option of a repeat imaging down the road and taking it from there.  Now states she does not want any further imaging studies since he will not be acting upon the findings.  We discussed referral to palliative care/hospice.  Agrees to be referred to palliative care.  \"I do not need them just yet anyway.\"  7.   Refer to palliative care.  Discussed comfort care and eventual hospice care.  8.   Refer to Dr. Ortiz re: port removal  9.   Return to office as needed per patient request.    I spent ~40 minutes caring for Juliocesar on this date of service. This time includes time spent by me in the following activities: preparing for the visit, reviewing tests, performing a medically appropriate examination and/or evaluation, counseling and educating the patient/family/caregiver, ordering medications, tests, or procedures and documenting information in the medical record.       "

## 2025-07-29 ENCOUNTER — OFFICE VISIT (OUTPATIENT)
Dept: ONCOLOGY | Facility: CLINIC | Age: OVER 89
End: 2025-07-29
Payer: MEDICARE

## 2025-07-29 ENCOUNTER — LAB (OUTPATIENT)
Dept: LAB | Facility: HOSPITAL | Age: OVER 89
End: 2025-07-29
Payer: MEDICARE

## 2025-07-29 VITALS
WEIGHT: 180 LBS | TEMPERATURE: 98.1 F | HEIGHT: 70 IN | SYSTOLIC BLOOD PRESSURE: 124 MMHG | RESPIRATION RATE: 18 BRPM | BODY MASS INDEX: 25.77 KG/M2 | HEART RATE: 87 BPM | DIASTOLIC BLOOD PRESSURE: 70 MMHG | OXYGEN SATURATION: 92 %

## 2025-07-29 DIAGNOSIS — C20 RECTAL ADENOCARCINOMA: Primary | ICD-10-CM

## 2025-07-29 LAB
ALBUMIN SERPL-MCNC: 3.6 G/DL (ref 3.5–5.2)
ALBUMIN/GLOB SERPL: 1.1 G/DL
ALP SERPL-CCNC: 94 U/L (ref 39–117)
ALT SERPL W P-5'-P-CCNC: 31 U/L (ref 1–41)
ANION GAP SERPL CALCULATED.3IONS-SCNC: 7 MMOL/L (ref 5–15)
AST SERPL-CCNC: 26 U/L (ref 1–40)
BASOPHILS # BLD AUTO: 0.04 10*3/MM3 (ref 0–0.2)
BASOPHILS NFR BLD AUTO: 0.4 % (ref 0–1.5)
BILIRUB SERPL-MCNC: 0.6 MG/DL (ref 0–1.2)
BUN SERPL-MCNC: 17 MG/DL (ref 8–23)
BUN/CREAT SERPL: 20.2 (ref 7–25)
CALCIUM SPEC-SCNC: 9.1 MG/DL (ref 8.2–9.6)
CHLORIDE SERPL-SCNC: 103 MMOL/L (ref 98–107)
CO2 SERPL-SCNC: 32 MMOL/L (ref 22–29)
CREAT SERPL-MCNC: 0.84 MG/DL (ref 0.76–1.27)
DEPRECATED RDW RBC AUTO: 46.3 FL (ref 37–54)
EGFRCR SERPLBLD CKD-EPI 2021: 81.8 ML/MIN/1.73
EOSINOPHIL # BLD AUTO: 0.32 10*3/MM3 (ref 0–0.4)
EOSINOPHIL NFR BLD AUTO: 3.5 % (ref 0.3–6.2)
ERYTHROCYTE [DISTWIDTH] IN BLOOD BY AUTOMATED COUNT: 13.1 % (ref 12.3–15.4)
GLOBULIN UR ELPH-MCNC: 3.4 GM/DL
GLUCOSE SERPL-MCNC: 173 MG/DL (ref 65–99)
HCT VFR BLD AUTO: 45.8 % (ref 37.5–51)
HGB BLD-MCNC: 15.3 G/DL (ref 13–17.7)
HOLD SPECIMEN: NORMAL
IMM GRANULOCYTES # BLD AUTO: 0.03 10*3/MM3 (ref 0–0.05)
IMM GRANULOCYTES NFR BLD AUTO: 0.3 % (ref 0–0.5)
LYMPHOCYTES # BLD AUTO: 1.93 10*3/MM3 (ref 0.7–3.1)
LYMPHOCYTES NFR BLD AUTO: 21.4 % (ref 19.6–45.3)
MCH RBC QN AUTO: 32.1 PG (ref 26.6–33)
MCHC RBC AUTO-ENTMCNC: 33.4 G/DL (ref 31.5–35.7)
MCV RBC AUTO: 96.2 FL (ref 79–97)
MONOCYTES # BLD AUTO: 0.8 10*3/MM3 (ref 0.1–0.9)
MONOCYTES NFR BLD AUTO: 8.9 % (ref 5–12)
NEUTROPHILS NFR BLD AUTO: 5.91 10*3/MM3 (ref 1.7–7)
NEUTROPHILS NFR BLD AUTO: 65.5 % (ref 42.7–76)
PLATELET # BLD AUTO: 135 10*3/MM3 (ref 140–450)
PMV BLD AUTO: 9.2 FL (ref 6–12)
POTASSIUM SERPL-SCNC: 4.8 MMOL/L (ref 3.5–5.2)
PROT SERPL-MCNC: 7 G/DL (ref 6–8.5)
RBC # BLD AUTO: 4.76 10*6/MM3 (ref 4.14–5.8)
SODIUM SERPL-SCNC: 142 MMOL/L (ref 136–145)
WBC NRBC COR # BLD AUTO: 9.03 10*3/MM3 (ref 3.4–10.8)

## 2025-07-29 PROCEDURE — 80053 COMPREHEN METABOLIC PANEL: CPT

## 2025-07-29 PROCEDURE — 85025 COMPLETE CBC W/AUTO DIFF WBC: CPT

## 2025-07-29 PROCEDURE — 36415 COLL VENOUS BLD VENIPUNCTURE: CPT

## 2025-08-07 ENCOUNTER — TELEPHONE (OUTPATIENT)
Dept: ONCOLOGY | Facility: CLINIC | Age: OVER 89
End: 2025-08-07
Payer: MEDICARE

## 2025-08-07 DIAGNOSIS — C20 RECTAL ADENOCARCINOMA: Primary | ICD-10-CM

## 2025-08-11 ENCOUNTER — OFFICE VISIT (OUTPATIENT)
Dept: GASTROENTEROLOGY | Facility: CLINIC | Age: OVER 89
End: 2025-08-11
Payer: MEDICARE

## 2025-08-11 VITALS
HEIGHT: 70 IN | SYSTOLIC BLOOD PRESSURE: 156 MMHG | WEIGHT: 181 LBS | TEMPERATURE: 97.7 F | OXYGEN SATURATION: 89 % | DIASTOLIC BLOOD PRESSURE: 70 MMHG | BODY MASS INDEX: 25.91 KG/M2 | HEART RATE: 92 BPM

## 2025-08-11 DIAGNOSIS — R14.2 BELCHING: Primary | ICD-10-CM

## 2025-08-11 PROCEDURE — 1160F RVW MEDS BY RX/DR IN RCRD: CPT | Performed by: NURSE PRACTITIONER

## 2025-08-11 PROCEDURE — 1159F MED LIST DOCD IN RCRD: CPT | Performed by: NURSE PRACTITIONER

## 2025-08-11 PROCEDURE — 99213 OFFICE O/P EST LOW 20 MIN: CPT | Performed by: NURSE PRACTITIONER

## 2025-08-13 ENCOUNTER — OFFICE VISIT (OUTPATIENT)
Dept: SURGERY | Facility: CLINIC | Age: OVER 89
End: 2025-08-13
Payer: MEDICARE

## 2025-08-13 VITALS
HEART RATE: 82 BPM | BODY MASS INDEX: 25.91 KG/M2 | DIASTOLIC BLOOD PRESSURE: 60 MMHG | HEIGHT: 70 IN | WEIGHT: 181 LBS | SYSTOLIC BLOOD PRESSURE: 110 MMHG | OXYGEN SATURATION: 96 %

## 2025-08-13 DIAGNOSIS — Z95.828 PORT-A-CATH IN PLACE: Primary | ICD-10-CM

## 2025-08-27 ENCOUNTER — TELEPHONE (OUTPATIENT)
Dept: SURGERY | Facility: CLINIC | Age: OVER 89
End: 2025-08-27
Payer: MEDICARE

## 2025-08-28 ENCOUNTER — ANESTHESIA (OUTPATIENT)
Dept: PERIOP | Facility: HOSPITAL | Age: OVER 89
End: 2025-08-28
Payer: MEDICARE

## 2025-08-28 ENCOUNTER — HOSPITAL ENCOUNTER (OUTPATIENT)
Facility: HOSPITAL | Age: OVER 89
Setting detail: HOSPITAL OUTPATIENT SURGERY
Discharge: HOME | End: 2025-08-28
Attending: STUDENT IN AN ORGANIZED HEALTH CARE EDUCATION/TRAINING PROGRAM | Admitting: STUDENT IN AN ORGANIZED HEALTH CARE EDUCATION/TRAINING PROGRAM
Payer: MEDICARE

## 2025-08-28 ENCOUNTER — ANESTHESIA EVENT (OUTPATIENT)
Dept: PERIOP | Facility: HOSPITAL | Age: OVER 89
End: 2025-08-28
Payer: MEDICARE

## 2025-08-28 VITALS
WEIGHT: 179.01 LBS | RESPIRATION RATE: 16 BRPM | OXYGEN SATURATION: 95 % | HEIGHT: 70 IN | DIASTOLIC BLOOD PRESSURE: 56 MMHG | TEMPERATURE: 97.1 F | SYSTOLIC BLOOD PRESSURE: 113 MMHG | BODY MASS INDEX: 25.63 KG/M2 | HEART RATE: 84 BPM

## 2025-08-28 DIAGNOSIS — Z95.828 PORT-A-CATH IN PLACE: ICD-10-CM

## 2025-08-28 LAB
ANION GAP SERPL CALCULATED.3IONS-SCNC: 10 MMOL/L (ref 5–15)
BUN SERPL-MCNC: 21.4 MG/DL (ref 8–23)
BUN/CREAT SERPL: 23.5 (ref 7–25)
CALCIUM SPEC-SCNC: 9.2 MG/DL (ref 8.2–9.6)
CHLORIDE SERPL-SCNC: 104 MMOL/L (ref 98–107)
CO2 SERPL-SCNC: 30 MMOL/L (ref 22–29)
CREAT SERPL-MCNC: 0.91 MG/DL (ref 0.76–1.27)
DEPRECATED RDW RBC AUTO: 47 FL (ref 37–54)
EGFRCR SERPLBLD CKD-EPI 2021: 79.1 ML/MIN/1.73
ERYTHROCYTE [DISTWIDTH] IN BLOOD BY AUTOMATED COUNT: 13.2 % (ref 12.3–15.4)
GLUCOSE BLDC GLUCOMTR-MCNC: 155 MG/DL (ref 70–130)
GLUCOSE SERPL-MCNC: 160 MG/DL (ref 65–99)
HCT VFR BLD AUTO: 46.9 % (ref 37.5–51)
HGB BLD-MCNC: 16 G/DL (ref 13–17.7)
MCH RBC QN AUTO: 32.8 PG (ref 26.6–33)
MCHC RBC AUTO-ENTMCNC: 34.1 G/DL (ref 31.5–35.7)
MCV RBC AUTO: 96.1 FL (ref 79–97)
PLATELET # BLD AUTO: 142 10*3/MM3 (ref 140–450)
PMV BLD AUTO: 9 FL (ref 6–12)
POTASSIUM SERPL-SCNC: 4.3 MMOL/L (ref 3.5–5.2)
QT INTERVAL: 390 MS
QTC INTERVAL: 458 MS
RBC # BLD AUTO: 4.88 10*6/MM3 (ref 4.14–5.8)
SODIUM SERPL-SCNC: 144 MMOL/L (ref 136–145)
WBC NRBC COR # BLD AUTO: 7.91 10*3/MM3 (ref 3.4–10.8)

## 2025-08-28 PROCEDURE — 25010000002 PROPOFOL 10 MG/ML EMULSION

## 2025-08-28 PROCEDURE — 85027 COMPLETE CBC AUTOMATED: CPT | Performed by: STUDENT IN AN ORGANIZED HEALTH CARE EDUCATION/TRAINING PROGRAM

## 2025-08-28 PROCEDURE — 25010000002 LIDOCAINE 1% - EPINEPHRINE 1:100000 1 %-1:100000 SOLUTION: Performed by: STUDENT IN AN ORGANIZED HEALTH CARE EDUCATION/TRAINING PROGRAM

## 2025-08-28 PROCEDURE — 25010000002 FENTANYL CITRATE (PF) 100 MCG/2ML SOLUTION

## 2025-08-28 PROCEDURE — 25810000003 LACTATED RINGERS PER 1000 ML: Performed by: STUDENT IN AN ORGANIZED HEALTH CARE EDUCATION/TRAINING PROGRAM

## 2025-08-28 PROCEDURE — 88300 SURGICAL PATH GROSS: CPT | Performed by: STUDENT IN AN ORGANIZED HEALTH CARE EDUCATION/TRAINING PROGRAM

## 2025-08-28 PROCEDURE — 80048 BASIC METABOLIC PNL TOTAL CA: CPT | Performed by: STUDENT IN AN ORGANIZED HEALTH CARE EDUCATION/TRAINING PROGRAM

## 2025-08-28 PROCEDURE — 25010000002 LIDOCAINE PF 2% 2 % SOLUTION

## 2025-08-28 PROCEDURE — 82948 REAGENT STRIP/BLOOD GLUCOSE: CPT | Performed by: STUDENT IN AN ORGANIZED HEALTH CARE EDUCATION/TRAINING PROGRAM

## 2025-08-28 PROCEDURE — 25010000002 CEFAZOLIN PER 500 MG

## 2025-08-28 PROCEDURE — 93005 ELECTROCARDIOGRAM TRACING: CPT | Performed by: STUDENT IN AN ORGANIZED HEALTH CARE EDUCATION/TRAINING PROGRAM

## 2025-08-28 RX ORDER — SODIUM CHLORIDE 0.9 % (FLUSH) 0.9 %
10 SYRINGE (ML) INJECTION EVERY 12 HOURS SCHEDULED
Status: DISCONTINUED | OUTPATIENT
Start: 2025-08-28 | End: 2025-08-28 | Stop reason: HOSPADM

## 2025-08-28 RX ORDER — TRAMADOL HYDROCHLORIDE 50 MG/1
50 TABLET ORAL EVERY 8 HOURS PRN
Qty: 5 TABLET | Refills: 0 | Status: SHIPPED | OUTPATIENT
Start: 2025-08-28 | End: 2025-08-31

## 2025-08-28 RX ORDER — FLUMAZENIL 0.1 MG/ML
0.2 INJECTION INTRAVENOUS AS NEEDED
Status: DISCONTINUED | OUTPATIENT
Start: 2025-08-28 | End: 2025-08-28 | Stop reason: HOSPADM

## 2025-08-28 RX ORDER — IBUPROFEN 600 MG/1
600 TABLET, FILM COATED ORAL EVERY 6 HOURS PRN
Status: DISCONTINUED | OUTPATIENT
Start: 2025-08-28 | End: 2025-08-28 | Stop reason: HOSPADM

## 2025-08-28 RX ORDER — PROPOFOL 10 MG/ML
VIAL (ML) INTRAVENOUS AS NEEDED
Status: DISCONTINUED | OUTPATIENT
Start: 2025-08-28 | End: 2025-08-28 | Stop reason: SURG

## 2025-08-28 RX ORDER — HYDROCODONE BITARTRATE AND ACETAMINOPHEN 5; 325 MG/1; MG/1
1 TABLET ORAL EVERY 4 HOURS PRN
Status: DISCONTINUED | OUTPATIENT
Start: 2025-08-28 | End: 2025-08-28 | Stop reason: HOSPADM

## 2025-08-28 RX ORDER — ONDANSETRON 2 MG/ML
4 INJECTION INTRAMUSCULAR; INTRAVENOUS ONCE AS NEEDED
Status: DISCONTINUED | OUTPATIENT
Start: 2025-08-28 | End: 2025-08-28 | Stop reason: HOSPADM

## 2025-08-28 RX ORDER — DROPERIDOL 2.5 MG/ML
0.62 INJECTION, SOLUTION INTRAMUSCULAR; INTRAVENOUS ONCE AS NEEDED
Status: DISCONTINUED | OUTPATIENT
Start: 2025-08-28 | End: 2025-08-28 | Stop reason: HOSPADM

## 2025-08-28 RX ORDER — SODIUM CHLORIDE 0.9 % (FLUSH) 0.9 %
10 SYRINGE (ML) INJECTION AS NEEDED
Status: DISCONTINUED | OUTPATIENT
Start: 2025-08-28 | End: 2025-08-28 | Stop reason: HOSPADM

## 2025-08-28 RX ORDER — LIDOCAINE HYDROCHLORIDE AND EPINEPHRINE 10; 10 MG/ML; UG/ML
INJECTION, SOLUTION INFILTRATION; PERINEURAL AS NEEDED
Status: DISCONTINUED | OUTPATIENT
Start: 2025-08-28 | End: 2025-08-28 | Stop reason: HOSPADM

## 2025-08-28 RX ORDER — ACETAMINOPHEN 325 MG/1
975 TABLET ORAL EVERY 8 HOURS
Start: 2025-08-28 | End: 2026-08-28

## 2025-08-28 RX ORDER — FENTANYL CITRATE 50 UG/ML
50 INJECTION, SOLUTION INTRAMUSCULAR; INTRAVENOUS
Status: DISCONTINUED | OUTPATIENT
Start: 2025-08-28 | End: 2025-08-28 | Stop reason: HOSPADM

## 2025-08-28 RX ORDER — FENTANYL CITRATE 50 UG/ML
25 INJECTION, SOLUTION INTRAMUSCULAR; INTRAVENOUS
Status: DISCONTINUED | OUTPATIENT
Start: 2025-08-28 | End: 2025-08-28 | Stop reason: HOSPADM

## 2025-08-28 RX ORDER — ONDANSETRON 4 MG/1
4 TABLET, FILM COATED ORAL EVERY 8 HOURS PRN
Qty: 15 TABLET | Refills: 0 | Status: SHIPPED | OUTPATIENT
Start: 2025-08-28 | End: 2026-08-28

## 2025-08-28 RX ORDER — DEXTROSE MONOHYDRATE 25 G/50ML
12.5 INJECTION, SOLUTION INTRAVENOUS AS NEEDED
Status: DISCONTINUED | OUTPATIENT
Start: 2025-08-28 | End: 2025-08-28 | Stop reason: HOSPADM

## 2025-08-28 RX ORDER — LIDOCAINE HYDROCHLORIDE 10 MG/ML
0.5 INJECTION, SOLUTION EPIDURAL; INFILTRATION; INTRACAUDAL; PERINEURAL ONCE AS NEEDED
Status: DISCONTINUED | OUTPATIENT
Start: 2025-08-28 | End: 2025-08-28 | Stop reason: HOSPADM

## 2025-08-28 RX ORDER — NALOXONE HCL 0.4 MG/ML
0.4 VIAL (ML) INJECTION AS NEEDED
Status: DISCONTINUED | OUTPATIENT
Start: 2025-08-28 | End: 2025-08-28 | Stop reason: HOSPADM

## 2025-08-28 RX ORDER — LIDOCAINE HYDROCHLORIDE 20 MG/ML
INJECTION, SOLUTION EPIDURAL; INFILTRATION; INTRACAUDAL; PERINEURAL AS NEEDED
Status: DISCONTINUED | OUTPATIENT
Start: 2025-08-28 | End: 2025-08-28 | Stop reason: SURG

## 2025-08-28 RX ORDER — HYDROCODONE BITARTRATE AND ACETAMINOPHEN 10; 325 MG/1; MG/1
1 TABLET ORAL EVERY 4 HOURS PRN
Status: DISCONTINUED | OUTPATIENT
Start: 2025-08-28 | End: 2025-08-28 | Stop reason: HOSPADM

## 2025-08-28 RX ORDER — FENTANYL CITRATE 50 UG/ML
INJECTION, SOLUTION INTRAMUSCULAR; INTRAVENOUS AS NEEDED
Status: DISCONTINUED | OUTPATIENT
Start: 2025-08-28 | End: 2025-08-28 | Stop reason: SURG

## 2025-08-28 RX ORDER — LABETALOL HYDROCHLORIDE 5 MG/ML
5 INJECTION, SOLUTION INTRAVENOUS
Status: DISCONTINUED | OUTPATIENT
Start: 2025-08-28 | End: 2025-08-28 | Stop reason: HOSPADM

## 2025-08-28 RX ORDER — SODIUM CHLORIDE 0.9 % (FLUSH) 0.9 %
3 SYRINGE (ML) INJECTION AS NEEDED
Status: DISCONTINUED | OUTPATIENT
Start: 2025-08-28 | End: 2025-08-28 | Stop reason: HOSPADM

## 2025-08-28 RX ORDER — MAGNESIUM HYDROXIDE 1200 MG/15ML
LIQUID ORAL AS NEEDED
Status: DISCONTINUED | OUTPATIENT
Start: 2025-08-28 | End: 2025-08-28 | Stop reason: HOSPADM

## 2025-08-28 RX ORDER — SODIUM CHLORIDE, SODIUM LACTATE, POTASSIUM CHLORIDE, CALCIUM CHLORIDE 600; 310; 30; 20 MG/100ML; MG/100ML; MG/100ML; MG/100ML
1000 INJECTION, SOLUTION INTRAVENOUS CONTINUOUS
Status: DISCONTINUED | OUTPATIENT
Start: 2025-08-28 | End: 2025-08-28 | Stop reason: HOSPADM

## 2025-08-28 RX ADMIN — FENTANYL CITRATE 25 MCG: 50 INJECTION, SOLUTION INTRAMUSCULAR; INTRAVENOUS at 10:13

## 2025-08-28 RX ADMIN — SODIUM CHLORIDE, POTASSIUM CHLORIDE, SODIUM LACTATE AND CALCIUM CHLORIDE 1000 ML: 600; 310; 30; 20 INJECTION, SOLUTION INTRAVENOUS at 09:19

## 2025-08-28 RX ADMIN — PROPOFOL 30 MG: 10 INJECTION, EMULSION INTRAVENOUS at 10:10

## 2025-08-28 RX ADMIN — CEFAZOLIN 2000 MG: 2 INJECTION, POWDER, FOR SOLUTION INTRAMUSCULAR; INTRAVENOUS at 10:11

## 2025-08-28 RX ADMIN — LIDOCAINE HYDROCHLORIDE 100 MG: 20 INJECTION, SOLUTION EPIDURAL; INFILTRATION; INTRACAUDAL; PERINEURAL at 10:10

## 2025-08-28 RX ADMIN — FENTANYL CITRATE 25 MCG: 50 INJECTION, SOLUTION INTRAMUSCULAR; INTRAVENOUS at 10:10

## 2025-08-29 ENCOUNTER — TELEPHONE (OUTPATIENT)
Dept: SURGERY | Facility: CLINIC | Age: OVER 89
End: 2025-08-29
Payer: MEDICARE

## 2025-08-29 LAB
LAB AP CASE REPORT: NORMAL
Lab: NORMAL
PATH REPORT.FINAL DX SPEC: NORMAL
PATH REPORT.GROSS SPEC: NORMAL

## (undated) DEVICE — ANTIBACTERIAL VIOLET BRAIDED (POLYGLACTIN 910), SYNTHETIC ABSORBABLE SUTURE: Brand: COATED VICRYL

## (undated) DEVICE — MASK,OXYGEN,MED CONC,ADLT,7' TUB, UC: Brand: PENDING

## (undated) DEVICE — SYR LL TP 10ML STRL

## (undated) DEVICE — CVR UNIV C/ARM

## (undated) DEVICE — GLV SURG SENSICARE W/ALOE PF LF 6.5 STRL

## (undated) DEVICE — ANTIBACTERIAL UNDYED BRAIDED (POLYGLACTIN 910), SYNTHETIC ABSORBABLE SUTURE: Brand: COATED VICRYL

## (undated) DEVICE — GLV SURG SENSICARE W/ALOE PF LF SZ6 STRL

## (undated) DEVICE — ELECTRD BLD EZ CLN MOD XLNG 2.75IN

## (undated) DEVICE — CUFF,BP,DISP,1 TUBE,ADULT,HP: Brand: MEDLINE

## (undated) DEVICE — INTENDED FOR TISSUE SEPARATION, AND OTHER PROCEDURES THAT REQUIRE A SHARP SURGICAL BLADE TO PUNCTURE OR CUT.: Brand: BARD-PARKER ® STAINLESS STEEL BLADES

## (undated) DEVICE — FRCP BIOP ENDO CAPTURA/PRO SERR SPK 2.8MM 230CM

## (undated) DEVICE — SENSR O2 OXIMAX FNGR A/ 18IN NONSTR

## (undated) DEVICE — TRAP FLD MINIVAC MEGADYNE 100ML

## (undated) DEVICE — SUT SILK 2/0 SUTUPAK TIES 24IN SA75H

## (undated) DEVICE — Device: Brand: DEFENDO AIR/WATER/SUCTION AND BIOPSY VALVE

## (undated) DEVICE — 4-PORT MANIFOLD: Brand: NEPTUNE 2

## (undated) DEVICE — SUT SILK 2/0 SH 30IN K833H

## (undated) DEVICE — YANKAUER,BULB TIP WITH VENT: Brand: ARGYLE

## (undated) DEVICE — MICRO HVTSA, 0.5G AND HVTSA SOURCEMARK PRODUCT CODE M1206 AND M1206-01: Brand: EXOFIN MICRO HVTSA, 0.5G

## (undated) DEVICE — PATIENT RETURN ELECTRODE, SINGLE-USE, CONTACT QUALITY MONITORING, ADULT, WITH 9FT CORD, FOR PATIENTS WEIGING OVER 33LBS. (15KG): Brand: MEGADYNE

## (undated) DEVICE — GLOVE,SURG,SENSICARE,ALOE,LF,PF,6: Brand: MEDLINE

## (undated) DEVICE — SUT MNCRYL 4/0 PS2 27IN UD MCP426H

## (undated) DEVICE — THE CHANNEL CLEANING BRUSH IS A NYLON FLEXI BRUSH ATTACHED TO A FLEXIBLE PLASTIC SHEATH DESIGNED TO SAFELY REMOVE DEBRIS FROM FLEXIBLE ENDOSCOPES.

## (undated) DEVICE — 3M™ IOBAN™ 2 ANTIMICROBIAL INCISE DRAPE 6650EZ: Brand: IOBAN™ 2

## (undated) DEVICE — PAD MINOR UNIVERSAL: Brand: MEDLINE INDUSTRIES, INC.

## (undated) DEVICE — NDL HYPO PRECISIONGLIDE REG 25G 1 1/2

## (undated) DEVICE — ENDO KIT,LOURDES HOSPITAL: Brand: MEDLINE INDUSTRIES, INC.

## (undated) DEVICE — DECANTER: Brand: UNBRANDED

## (undated) DEVICE — ADHS SKIN PREMIERPRO EXOFIN TOPICAL HI/VISC .5ML

## (undated) DEVICE — APPL CHLORAPREP HI/LITE 26ML ORNG